# Patient Record
Sex: MALE | Race: WHITE | NOT HISPANIC OR LATINO | URBAN - METROPOLITAN AREA
[De-identification: names, ages, dates, MRNs, and addresses within clinical notes are randomized per-mention and may not be internally consistent; named-entity substitution may affect disease eponyms.]

---

## 2021-04-28 ENCOUNTER — INPATIENT (INPATIENT)
Facility: HOSPITAL | Age: 76
LOS: 1 days | Discharge: HOME | End: 2021-04-30
Attending: THORACIC SURGERY (CARDIOTHORACIC VASCULAR SURGERY) | Admitting: THORACIC SURGERY (CARDIOTHORACIC VASCULAR SURGERY)
Payer: MEDICARE

## 2021-04-28 VITALS
HEART RATE: 78 BPM | DIASTOLIC BLOOD PRESSURE: 80 MMHG | RESPIRATION RATE: 18 BRPM | OXYGEN SATURATION: 98 % | WEIGHT: 210.1 LBS | TEMPERATURE: 98 F | SYSTOLIC BLOOD PRESSURE: 174 MMHG

## 2021-04-28 LAB
ALBUMIN SERPL ELPH-MCNC: 4.7 G/DL — SIGNIFICANT CHANGE UP (ref 3.5–5.2)
ALP SERPL-CCNC: 69 U/L — SIGNIFICANT CHANGE UP (ref 30–115)
ALT FLD-CCNC: 14 U/L — SIGNIFICANT CHANGE UP (ref 0–41)
ANION GAP SERPL CALC-SCNC: 13 MMOL/L — SIGNIFICANT CHANGE UP (ref 7–14)
APTT BLD: 29.8 SEC — SIGNIFICANT CHANGE UP (ref 27–39.2)
AST SERPL-CCNC: 15 U/L — SIGNIFICANT CHANGE UP (ref 0–41)
BASOPHILS # BLD AUTO: 0.08 K/UL — SIGNIFICANT CHANGE UP (ref 0–0.2)
BASOPHILS NFR BLD AUTO: 0.7 % — SIGNIFICANT CHANGE UP (ref 0–1)
BILIRUB SERPL-MCNC: 0.4 MG/DL — SIGNIFICANT CHANGE UP (ref 0.2–1.2)
BUN SERPL-MCNC: 27 MG/DL — HIGH (ref 10–20)
CALCIUM SERPL-MCNC: 9.8 MG/DL — SIGNIFICANT CHANGE UP (ref 8.5–10.1)
CHLORIDE SERPL-SCNC: 106 MMOL/L — SIGNIFICANT CHANGE UP (ref 98–110)
CO2 SERPL-SCNC: 21 MMOL/L — SIGNIFICANT CHANGE UP (ref 17–32)
CREAT SERPL-MCNC: 1.3 MG/DL — SIGNIFICANT CHANGE UP (ref 0.7–1.5)
EOSINOPHIL # BLD AUTO: 0.44 K/UL — SIGNIFICANT CHANGE UP (ref 0–0.7)
EOSINOPHIL NFR BLD AUTO: 3.7 % — SIGNIFICANT CHANGE UP (ref 0–8)
GLUCOSE SERPL-MCNC: 111 MG/DL — HIGH (ref 70–99)
HCT VFR BLD CALC: 42.8 % — SIGNIFICANT CHANGE UP (ref 42–52)
HGB BLD-MCNC: 14 G/DL — SIGNIFICANT CHANGE UP (ref 14–18)
IMM GRANULOCYTES NFR BLD AUTO: 0.6 % — HIGH (ref 0.1–0.3)
INR BLD: 1.01 RATIO — SIGNIFICANT CHANGE UP (ref 0.65–1.3)
LYMPHOCYTES # BLD AUTO: 29.5 % — SIGNIFICANT CHANGE UP (ref 20.5–51.1)
LYMPHOCYTES # BLD AUTO: 3.49 K/UL — HIGH (ref 1.2–3.4)
MCHC RBC-ENTMCNC: 29 PG — SIGNIFICANT CHANGE UP (ref 27–31)
MCHC RBC-ENTMCNC: 32.7 G/DL — SIGNIFICANT CHANGE UP (ref 32–37)
MCV RBC AUTO: 88.6 FL — SIGNIFICANT CHANGE UP (ref 80–94)
MONOCYTES # BLD AUTO: 1 K/UL — HIGH (ref 0.1–0.6)
MONOCYTES NFR BLD AUTO: 8.4 % — SIGNIFICANT CHANGE UP (ref 1.7–9.3)
NEUTROPHILS # BLD AUTO: 6.77 K/UL — HIGH (ref 1.4–6.5)
NEUTROPHILS NFR BLD AUTO: 57.1 % — SIGNIFICANT CHANGE UP (ref 42.2–75.2)
NRBC # BLD: 0 /100 WBCS — SIGNIFICANT CHANGE UP (ref 0–0)
PLATELET # BLD AUTO: 228 K/UL — SIGNIFICANT CHANGE UP (ref 130–400)
POTASSIUM SERPL-MCNC: 4.8 MMOL/L — SIGNIFICANT CHANGE UP (ref 3.5–5)
POTASSIUM SERPL-SCNC: 4.8 MMOL/L — SIGNIFICANT CHANGE UP (ref 3.5–5)
PROT SERPL-MCNC: 7.2 G/DL — SIGNIFICANT CHANGE UP (ref 6–8)
PROTHROM AB SERPL-ACNC: 11.6 SEC — SIGNIFICANT CHANGE UP (ref 9.95–12.87)
RBC # BLD: 4.83 M/UL — SIGNIFICANT CHANGE UP (ref 4.7–6.1)
RBC # FLD: 13 % — SIGNIFICANT CHANGE UP (ref 11.5–14.5)
SARS-COV-2 RNA SPEC QL NAA+PROBE: SIGNIFICANT CHANGE UP
SODIUM SERPL-SCNC: 140 MMOL/L — SIGNIFICANT CHANGE UP (ref 135–146)
TROPONIN T SERPL-MCNC: <0.01 NG/ML — SIGNIFICANT CHANGE UP
WBC # BLD: 11.85 K/UL — HIGH (ref 4.8–10.8)
WBC # FLD AUTO: 11.85 K/UL — HIGH (ref 4.8–10.8)

## 2021-04-28 PROCEDURE — 93010 ELECTROCARDIOGRAM REPORT: CPT

## 2021-04-28 PROCEDURE — 99285 EMERGENCY DEPT VISIT HI MDM: CPT

## 2021-04-28 PROCEDURE — 71045 X-RAY EXAM CHEST 1 VIEW: CPT | Mod: 26

## 2021-04-28 RX ORDER — DEXTROSE 50 % IN WATER 50 %
25 SYRINGE (ML) INTRAVENOUS ONCE
Refills: 0 | Status: DISCONTINUED | OUTPATIENT
Start: 2021-04-28 | End: 2021-04-30

## 2021-04-28 RX ORDER — DEXTROSE 50 % IN WATER 50 %
15 SYRINGE (ML) INTRAVENOUS ONCE
Refills: 0 | Status: DISCONTINUED | OUTPATIENT
Start: 2021-04-28 | End: 2021-04-30

## 2021-04-28 RX ORDER — ATORVASTATIN CALCIUM 80 MG/1
0 TABLET, FILM COATED ORAL
Qty: 0 | Refills: 0 | DISCHARGE

## 2021-04-28 RX ORDER — ENOXAPARIN SODIUM 100 MG/ML
40 INJECTION SUBCUTANEOUS DAILY
Refills: 0 | Status: DISCONTINUED | OUTPATIENT
Start: 2021-04-28 | End: 2021-04-30

## 2021-04-28 RX ORDER — GLUCAGON INJECTION, SOLUTION 0.5 MG/.1ML
1 INJECTION, SOLUTION SUBCUTANEOUS ONCE
Refills: 0 | Status: DISCONTINUED | OUTPATIENT
Start: 2021-04-28 | End: 2021-04-30

## 2021-04-28 RX ORDER — INSULIN GLARGINE 100 [IU]/ML
20 INJECTION, SOLUTION SUBCUTANEOUS AT BEDTIME
Refills: 0 | Status: DISCONTINUED | OUTPATIENT
Start: 2021-04-28 | End: 2021-04-28

## 2021-04-28 RX ORDER — LISINOPRIL 2.5 MG/1
0 TABLET ORAL
Qty: 0 | Refills: 0 | DISCHARGE

## 2021-04-28 RX ORDER — ASPIRIN/CALCIUM CARB/MAGNESIUM 324 MG
162 TABLET ORAL ONCE
Refills: 0 | Status: COMPLETED | OUTPATIENT
Start: 2021-04-28 | End: 2021-04-28

## 2021-04-28 RX ORDER — INSULIN LISPRO 100/ML
VIAL (ML) SUBCUTANEOUS
Refills: 0 | Status: DISCONTINUED | OUTPATIENT
Start: 2021-04-28 | End: 2021-04-30

## 2021-04-28 RX ORDER — METOPROLOL TARTRATE 50 MG
0 TABLET ORAL
Qty: 0 | Refills: 0 | DISCHARGE

## 2021-04-28 RX ORDER — SODIUM CHLORIDE 9 MG/ML
1000 INJECTION, SOLUTION INTRAVENOUS
Refills: 0 | Status: DISCONTINUED | OUTPATIENT
Start: 2021-04-28 | End: 2021-04-30

## 2021-04-28 RX ORDER — CHLORHEXIDINE GLUCONATE 213 G/1000ML
1 SOLUTION TOPICAL DAILY
Refills: 0 | Status: DISCONTINUED | OUTPATIENT
Start: 2021-04-28 | End: 2021-04-30

## 2021-04-28 RX ORDER — METFORMIN HYDROCHLORIDE 850 MG/1
0 TABLET ORAL
Qty: 0 | Refills: 0 | DISCHARGE

## 2021-04-28 RX ADMIN — Medication 162 MILLIGRAM(S): at 22:55

## 2021-04-28 NOTE — H&P ADULT - NSHPPHYSICALEXAM_GEN_ALL_CORE
CONSTITUTIONAL: Well-appearing; well-nourished; in no apparent distress.   	EYES: PERRL; EOM intact.   	CARDIOVASCULAR: Normal S1, S2; no murmurs, rubs, or gallops.   	RESPIRATORY: Normal chest excursion with respiration; breath sounds clear and equal bilaterally; no wheezes, rhonchi, or rales.  	GI/: Normal bowel sounds; non-distended; non-tender; no palpable organomegaly.   	MS: No calf swelling and tenderness.  	SKIN: Normal for age and race; warm; dry; good turgor; no apparent lesions or exudate.   NEURO/PSYCH: A & O x 4; grossly unremarkable.

## 2021-04-28 NOTE — H&P ADULT - NSHPLABSRESULTS_GEN_ALL_CORE
LABS:                        14.0   11.85 )-----------( 228      ( 28 Apr 2021 21:46 )             42.8     04-28    140  |  106  |  27<H>  ----------------------------<  111<H>  4.8   |  21  |  1.3    Ca    9.8      28 Apr 2021 21:46    TPro  7.2  /  Alb  4.7  /  TBili  0.4  /  DBili  x   /  AST  15  /  ALT  14  /  AlkPhos  69  04-28    PT/INR - ( 28 Apr 2021 21:46 )   PT: 11.60 sec;   INR: 1.01 ratio         PTT - ( 28 Apr 2021 21:46 )  PTT:29.8 sec      Troponin T, Serum: <0.01 ng/mL (04-28-21 @ 21:46)      CARDIAC MARKERS ( 28 Apr 2021 21:46 )  x     / <0.01 ng/mL / x     / x     / x

## 2021-04-28 NOTE — ED PROVIDER NOTE - CLINICAL SUMMARY MEDICAL DECISION MAKING FREE TEXT BOX
Pt was sent in by cardiologist Dr. Collins for cardiac cath. Required ekg, labs, imaging. Will admit to tele for further management.

## 2021-04-28 NOTE — H&P ADULT - HISTORY OF PRESENT ILLNESS
76 yo male hx of HTN/HLD/heavy smoker present c/o dyspnea on exertion for few weeks. Seen by PMD as outpatient had grossly positive nuclear stress as outpatient few days ago. (decreased perfusion/wall motion to inferior wall). continue with SOB on exertion so he came to ED for evaluation. denies chest pain/diaphoresis assoc with SOB.   denies fever/chill/coughing/abd pain/n/v/d and urinary sxs    Vital Signs Last 24 Hrs  T(F): 97.8 (28 Apr 2021 20:53), Max: 97.8 (28 Apr 2021 20:53)  HR: 78 (28 Apr 2021 20:53) (78 - 78)  BP: 174/80 (28 Apr 2021 20:53) (174/80 - 174/80)  RR: 18 (28 Apr 2021 20:53) (18 - 18)  SpO2: 98% (28 Apr 2021 20:53) (98% - 98%)    In Ed patient was recived vitally stable and EKG showed Sinus with TWI to lateral leads (AVL and V5,6) with PVCs. Trops negative x1 (<0.01)  ED provider spoke to Karina. no plavix/heparin now. Cath tomorrow. keep NPO after midnight.

## 2021-04-28 NOTE — ED PROVIDER NOTE - OBJECTIVE STATEMENT
76 yo male hx of HTN/HLD/heavy smoker present c/o dyspnea on exertion for few weeks. Seen by PMD as outpatient had grossly positive nuclear stress as outpatient few days ago. (decreased perfusion/wall motion to inferior wall). continue with SOB on exertion so he came to ED for evaluation. denies chest pain/diaphoresis assoc with SOB.   denies fever/chill/coughing/abd pain/n/v/d and urinary sxs.

## 2021-04-28 NOTE — ED PROVIDER NOTE - IV ALTEPLASE ADMIN OUTSIDE HIDDEN
Assumed cares [id number]. Admitted this evening for COVID-19 and hyponatremia. A&Ox4. VSS on 2L O2. A1 and BA on for safety. PIV x2 TKO and infusing ABX. C/o SOB and frequent cough. Sputum sample ordered but unable to obtain this shift. Clear liquid diet and 1.5L FR. On tele monitoring for hyponatremia. Last Na+ check was 133. Endorsed pain in back and lidocaine patch placed. Nasal swab obtained for MRSA. Voiding spontaneously. BM x1 and stool sample obtained. Stool was black and pt stated he took activated charcoal. Will continue to monitor and update MD with changes.       show

## 2021-04-28 NOTE — ED ADULT NURSE REASSESSMENT NOTE - NS ED NURSE REASSESS COMMENT FT1
Assumed care. Pt lying comfortably in bed. Respiration even and unlabored. Cardiac monitor in progress with NSR. Pt denies any chest pain or SOB. VSS. Needs met. Call bell placed within reach. Pending admission bed. will monitor

## 2021-04-28 NOTE — ED PROVIDER NOTE - ATTENDING CONTRIBUTION TO CARE
I personally evaluated the patient. I reviewed the Resident’s or Physician Assistant’s note (as assigned above), and agree with the findings and plan except as documented in my note.  75 M with hx of HTN, CVA, DM with complaints of 2 weeks of coughing, SOB, fever and generalized body aches. Pt report having 2 neg outpt Covid tests. States that his PMD did CXR w/o any findings. Was started on z-dawit, prednisone and benzoate for management of his symptoms. Denies dizziness, abd pain,  n/v/d, no urinary complaints. VS reviewed, pt non-toxic appearing however pt is diaphoretic, NAD. Head ncat, MMM, neck supple, normal ROM, normal s1s2 without any murmurs, Lungs CTAB with normal work of breathing. abd +BS, s/nd/nt, no CVAT b/l, extremities wnl, neuro exam grossly normal. No acute skin rashes. Plan is ekg, labs, imaging, meds as needed and dispo accordingly.   ' I personally evaluated the patient. I reviewed the Resident’s or Physician Assistant’s note (as assigned above), and agree with the findings and plan except as documented in my note. I personally evaluated the patient. I reviewed the Resident’s or Physician Assistant’s note (as assigned above), and agree with the findings and plan except as documented in my note.  75 M with hx of HTN, HLD, + smoker was sent in by his cardiologist Dr. Ayush Collins for urgent cardiac cath (in the am). Pt with several weeks of exertionally I personally evaluated the patient. I reviewed the Resident’s or Physician Assistant’s note (as assigned above), and agree with the findings and plan except as documented in my note.  75 M with hx of HTN, HLD, + smoker was sent in by his cardiologist Dr. Ayush Collins for urgent cardiac cath (in the am). Pt with several weeks of exertional SOB. No fever, coughing, LOC. VS reviewed, pt non-toxic appearing, NAD. Head ncat, MMM, pharyngeal exam w/o erythema, edema or exudates. B/l TM wnl. neck supple, normal ROM, normal s1s2 without any murmurs, Lungs CTAB with normal work of breathing. abd +BS, s/nd/nt, extremities wnl, neuro exam grossly normal. No acute skin rashes. Plan is ekg, labs, imaging and dispo accordingly.

## 2021-04-28 NOTE — H&P ADULT - ASSESSMENT
76 yo male hx of HTN/HLD/heavy smoker present c/o dyspnea on exertion for few weeks. Seen by PMD as outpatient had grossly positive nuclear stress as outpatient few days ago. (decreased perfusion/wall motion to inferior wall). continue with SOB on exertion so he came to ED for evaluation.    # Stable Angina  - Stress test as OP showed decreased perfusion/wall motion to inferior wall  - EKG showed Sinus with TWI to lateral leads (AVL and V5,6) with PVCs. Trops negative x1 (<0.01)  - ED provider spoke to Sadamariana. no plavix/heparin now. Cath tomorrow.   - keep NPO after midnight.    # H/o HLD  - follow lipid profile in am   - c/w statins    # H/o HTN  -Monitor BP  -Continue home meds    Diet: NPO @ MN  Activity: IAT  DVT Prophylaxis: Lovenox  GI Prophylaxis: not indicated  CHG Ordered  Code Status: FULL  Disposition: admit to tele   74 yo male hx of HTN/HLD/heavy smoker present c/o dyspnea on exertion for few weeks. Seen by PMD as outpatient had grossly positive nuclear stress as outpatient few days ago. (decreased perfusion/wall motion to inferior wall). continue with SOB on exertion so he came to ED for evaluation.    # Stable Angina  - Stress test as OP showed decreased perfusion/wall motion to inferior wall  - EKG showed Sinus with TWI to lateral leads (AVL and V5,6) with PVCs. Trops negative x1 (<0.01)  - ED provider spoke to Sadamariana. no plavix/heparin now. Cath tomorrow.   - keep NPO after midnight.    # H/o DM  -Monitor blood glucose  -Check A1c  -goal glucose level: 140-180 mg/dl  -start basal/bolus insulin if FS >180    # H/o HLD  - follow lipid profile in am   - c/w statins    # H/o HTN  -Monitor BP  -Continue home meds    Diet: NPO @ MN  Activity: IAT  DVT Prophylaxis: Lovenox  GI Prophylaxis: not indicated  CHG Ordered  Code Status: FULL  Disposition: admit to tele

## 2021-04-29 LAB
A1C WITH ESTIMATED AVERAGE GLUCOSE RESULT: 7.8 % — HIGH (ref 4–5.6)
ALBUMIN SERPL ELPH-MCNC: 4.4 G/DL — SIGNIFICANT CHANGE UP (ref 3.5–5.2)
ALP SERPL-CCNC: 64 U/L — SIGNIFICANT CHANGE UP (ref 30–115)
ALT FLD-CCNC: 14 U/L — SIGNIFICANT CHANGE UP (ref 0–41)
ANION GAP SERPL CALC-SCNC: 12 MMOL/L — SIGNIFICANT CHANGE UP (ref 7–14)
APPEARANCE UR: CLEAR — SIGNIFICANT CHANGE UP
APTT BLD: 30.3 SEC — SIGNIFICANT CHANGE UP (ref 27–39.2)
AST SERPL-CCNC: 15 U/L — SIGNIFICANT CHANGE UP (ref 0–41)
BASOPHILS # BLD AUTO: 0.07 K/UL — SIGNIFICANT CHANGE UP (ref 0–0.2)
BASOPHILS NFR BLD AUTO: 0.7 % — SIGNIFICANT CHANGE UP (ref 0–1)
BILIRUB SERPL-MCNC: 0.5 MG/DL — SIGNIFICANT CHANGE UP (ref 0.2–1.2)
BILIRUB UR-MCNC: NEGATIVE — SIGNIFICANT CHANGE UP
BLD GP AB SCN SERPL QL: SIGNIFICANT CHANGE UP
BUN SERPL-MCNC: 22 MG/DL — HIGH (ref 10–20)
CALCIUM SERPL-MCNC: 9.4 MG/DL — SIGNIFICANT CHANGE UP (ref 8.5–10.1)
CHLORIDE SERPL-SCNC: 106 MMOL/L — SIGNIFICANT CHANGE UP (ref 98–110)
CHOLEST SERPL-MCNC: 132 MG/DL — SIGNIFICANT CHANGE UP
CO2 SERPL-SCNC: 22 MMOL/L — SIGNIFICANT CHANGE UP (ref 17–32)
COLOR SPEC: SIGNIFICANT CHANGE UP
CREAT SERPL-MCNC: 1.1 MG/DL — SIGNIFICANT CHANGE UP (ref 0.7–1.5)
DIFF PNL FLD: NEGATIVE — SIGNIFICANT CHANGE UP
EOSINOPHIL # BLD AUTO: 0.42 K/UL — SIGNIFICANT CHANGE UP (ref 0–0.7)
EOSINOPHIL NFR BLD AUTO: 4.2 % — SIGNIFICANT CHANGE UP (ref 0–8)
ESTIMATED AVERAGE GLUCOSE: 177 MG/DL — HIGH (ref 68–114)
GLUCOSE BLDC GLUCOMTR-MCNC: 126 MG/DL — HIGH (ref 70–99)
GLUCOSE BLDC GLUCOMTR-MCNC: 145 MG/DL — HIGH (ref 70–99)
GLUCOSE BLDC GLUCOMTR-MCNC: 93 MG/DL — SIGNIFICANT CHANGE UP (ref 70–99)
GLUCOSE SERPL-MCNC: 134 MG/DL — HIGH (ref 70–99)
GLUCOSE UR QL: NEGATIVE — SIGNIFICANT CHANGE UP
HCT VFR BLD CALC: 41.9 % — LOW (ref 42–52)
HCV AB S/CO SERPL IA: 0.03 COI — SIGNIFICANT CHANGE UP
HCV AB SERPL-IMP: SIGNIFICANT CHANGE UP
HDLC SERPL-MCNC: 28 MG/DL — LOW
HGB BLD-MCNC: 13.6 G/DL — LOW (ref 14–18)
IMM GRANULOCYTES NFR BLD AUTO: 0.4 % — HIGH (ref 0.1–0.3)
INR BLD: 1.03 RATIO — SIGNIFICANT CHANGE UP (ref 0.65–1.3)
KETONES UR-MCNC: SIGNIFICANT CHANGE UP
LACTATE SERPL-SCNC: 0.9 MMOL/L — SIGNIFICANT CHANGE UP (ref 0.7–2)
LEUKOCYTE ESTERASE UR-ACNC: NEGATIVE — SIGNIFICANT CHANGE UP
LIPID PNL WITH DIRECT LDL SERPL: 80 MG/DL — SIGNIFICANT CHANGE UP
LYMPHOCYTES # BLD AUTO: 2.58 K/UL — SIGNIFICANT CHANGE UP (ref 1.2–3.4)
LYMPHOCYTES # BLD AUTO: 25.7 % — SIGNIFICANT CHANGE UP (ref 20.5–51.1)
MAGNESIUM SERPL-MCNC: 1.9 MG/DL — SIGNIFICANT CHANGE UP (ref 1.8–2.4)
MCHC RBC-ENTMCNC: 29 PG — SIGNIFICANT CHANGE UP (ref 27–31)
MCHC RBC-ENTMCNC: 32.5 G/DL — SIGNIFICANT CHANGE UP (ref 32–37)
MCV RBC AUTO: 89.3 FL — SIGNIFICANT CHANGE UP (ref 80–94)
MONOCYTES # BLD AUTO: 0.9 K/UL — HIGH (ref 0.1–0.6)
MONOCYTES NFR BLD AUTO: 9 % — SIGNIFICANT CHANGE UP (ref 1.7–9.3)
MRSA PCR RESULT.: NEGATIVE — SIGNIFICANT CHANGE UP
NEUTROPHILS # BLD AUTO: 6.02 K/UL — SIGNIFICANT CHANGE UP (ref 1.4–6.5)
NEUTROPHILS NFR BLD AUTO: 60 % — SIGNIFICANT CHANGE UP (ref 42.2–75.2)
NITRITE UR-MCNC: NEGATIVE — SIGNIFICANT CHANGE UP
NON HDL CHOLESTEROL: 104 MG/DL — SIGNIFICANT CHANGE UP
NRBC # BLD: 0 /100 WBCS — SIGNIFICANT CHANGE UP (ref 0–0)
NT-PROBNP SERPL-SCNC: 616 PG/ML — HIGH (ref 0–300)
PH UR: 6 — SIGNIFICANT CHANGE UP (ref 5–8)
PHOSPHATE SERPL-MCNC: 3.4 MG/DL — SIGNIFICANT CHANGE UP (ref 2.1–4.9)
PLATELET # BLD AUTO: 217 K/UL — SIGNIFICANT CHANGE UP (ref 130–400)
POTASSIUM SERPL-MCNC: 4.6 MMOL/L — SIGNIFICANT CHANGE UP (ref 3.5–5)
POTASSIUM SERPL-SCNC: 4.6 MMOL/L — SIGNIFICANT CHANGE UP (ref 3.5–5)
PROT SERPL-MCNC: 6.6 G/DL — SIGNIFICANT CHANGE UP (ref 6–8)
PROT UR-MCNC: NEGATIVE — SIGNIFICANT CHANGE UP
PROTHROM AB SERPL-ACNC: 11.8 SEC — SIGNIFICANT CHANGE UP (ref 9.95–12.87)
RBC # BLD: 4.69 M/UL — LOW (ref 4.7–6.1)
RBC # FLD: 13.2 % — SIGNIFICANT CHANGE UP (ref 11.5–14.5)
SODIUM SERPL-SCNC: 140 MMOL/L — SIGNIFICANT CHANGE UP (ref 135–146)
SP GR SPEC: 1.02 — SIGNIFICANT CHANGE UP (ref 1.01–1.03)
TRIGL SERPL-MCNC: 154 MG/DL — HIGH
TROPONIN T SERPL-MCNC: <0.01 NG/ML — SIGNIFICANT CHANGE UP
UROBILINOGEN FLD QL: SIGNIFICANT CHANGE UP
WBC # BLD: 10.03 K/UL — SIGNIFICANT CHANGE UP (ref 4.8–10.8)
WBC # FLD AUTO: 10.03 K/UL — SIGNIFICANT CHANGE UP (ref 4.8–10.8)

## 2021-04-29 PROCEDURE — 93460 R&L HRT ART/VENTRICLE ANGIO: CPT | Mod: 26

## 2021-04-29 PROCEDURE — 93880 EXTRACRANIAL BILAT STUDY: CPT | Mod: 26

## 2021-04-29 PROCEDURE — 93306 TTE W/DOPPLER COMPLETE: CPT | Mod: 26

## 2021-04-29 PROCEDURE — 71250 CT THORAX DX C-: CPT | Mod: 26

## 2021-04-29 PROCEDURE — 93010 ELECTROCARDIOGRAM REPORT: CPT

## 2021-04-29 RX ORDER — ATORVASTATIN CALCIUM 80 MG/1
40 TABLET, FILM COATED ORAL AT BEDTIME
Refills: 0 | Status: DISCONTINUED | OUTPATIENT
Start: 2021-04-29 | End: 2021-04-30

## 2021-04-29 RX ORDER — SODIUM CHLORIDE 9 MG/ML
1000 INJECTION INTRAMUSCULAR; INTRAVENOUS; SUBCUTANEOUS
Refills: 0 | Status: DISCONTINUED | OUTPATIENT
Start: 2021-04-29 | End: 2021-04-30

## 2021-04-29 RX ORDER — IPRATROPIUM/ALBUTEROL SULFATE 18-103MCG
3 AEROSOL WITH ADAPTER (GRAM) INHALATION EVERY 6 HOURS
Refills: 0 | Status: DISCONTINUED | OUTPATIENT
Start: 2021-04-29 | End: 2021-04-30

## 2021-04-29 RX ORDER — LISINOPRIL 2.5 MG/1
5 TABLET ORAL DAILY
Refills: 0 | Status: DISCONTINUED | OUTPATIENT
Start: 2021-04-29 | End: 2021-04-29

## 2021-04-29 RX ORDER — NICOTINE POLACRILEX 2 MG
1 GUM BUCCAL DAILY
Refills: 0 | Status: DISCONTINUED | OUTPATIENT
Start: 2021-04-29 | End: 2021-04-30

## 2021-04-29 RX ORDER — LANOLIN ALCOHOL/MO/W.PET/CERES
5 CREAM (GRAM) TOPICAL AT BEDTIME
Refills: 0 | Status: DISCONTINUED | OUTPATIENT
Start: 2021-04-29 | End: 2021-04-30

## 2021-04-29 RX ORDER — METOPROLOL TARTRATE 50 MG
25 TABLET ORAL DAILY
Refills: 0 | Status: DISCONTINUED | OUTPATIENT
Start: 2021-04-29 | End: 2021-04-30

## 2021-04-29 RX ORDER — SODIUM CHLORIDE 9 MG/ML
900 INJECTION INTRAMUSCULAR; INTRAVENOUS; SUBCUTANEOUS
Refills: 0 | Status: DISCONTINUED | OUTPATIENT
Start: 2021-04-29 | End: 2021-04-29

## 2021-04-29 RX ORDER — PANTOPRAZOLE SODIUM 20 MG/1
40 TABLET, DELAYED RELEASE ORAL
Refills: 0 | Status: DISCONTINUED | OUTPATIENT
Start: 2021-04-30 | End: 2021-04-30

## 2021-04-29 RX ADMIN — Medication 600 MILLIGRAM(S): at 17:12

## 2021-04-29 RX ADMIN — Medication 1 PATCH: at 19:28

## 2021-04-29 RX ADMIN — ENOXAPARIN SODIUM 40 MILLIGRAM(S): 100 INJECTION SUBCUTANEOUS at 16:19

## 2021-04-29 RX ADMIN — ATORVASTATIN CALCIUM 40 MILLIGRAM(S): 80 TABLET, FILM COATED ORAL at 21:55

## 2021-04-29 RX ADMIN — CHLORHEXIDINE GLUCONATE 1 APPLICATION(S): 213 SOLUTION TOPICAL at 16:16

## 2021-04-29 RX ADMIN — SODIUM CHLORIDE 100 MILLILITER(S): 9 INJECTION INTRAMUSCULAR; INTRAVENOUS; SUBCUTANEOUS at 17:13

## 2021-04-29 RX ADMIN — Medication 1 PATCH: at 16:17

## 2021-04-29 RX ADMIN — Medication 5 MILLIGRAM(S): at 21:55

## 2021-04-29 NOTE — MEDICAL STUDENT PROGRESS NOTE(EDUCATION) - NS MD HP STUD ASPLAN ASSES FT
Mr. Vasquez is a 74 yo male with a history of HTN, HLD, heavy smoking, and DM presented yesterday with worsening dyspnea on exertion, today found to have multi-vessel CAD and severe AS, planning to get AVR CABG soon.

## 2021-04-29 NOTE — PROGRESS NOTE ADULT - SUBJECTIVE AND OBJECTIVE BOX
Hospital Day:  1d    Subjective: Patient is a 75y old  Male who presents with a chief complaint of Chest pain (28 Apr 2021 23:22)      Pt seen and evaluated at bedside.   Complaints: patient reports dyspnea on exertion but no chest pain; comfortable at rest   Over the night Events: none     Past Medical Hx:   HTN (hypertension)    HLD (hyperlipidemia)      Past Sx:  No significant past surgical history      Allergies:  No Known Allergies    Current Meds:   Standng Meds:  atorvastatin 40 milliGRAM(s) Oral at bedtime  chlorhexidine 4% Liquid 1 Application(s) Topical daily  dextrose 40% Gel 15 Gram(s) Oral once  dextrose 5%. 1000 milliLiter(s) (50 mL/Hr) IV Continuous <Continuous>  dextrose 50% Injectable 25 Gram(s) IV Push once  enoxaparin Injectable 40 milliGRAM(s) SubCutaneous daily  glucagon  Injectable 1 milliGRAM(s) IntraMuscular once  insulin lispro (ADMELOG) corrective regimen sliding scale   SubCutaneous three times a day before meals  lisinopril 5 milliGRAM(s) Oral daily  metoprolol succinate ER 25 milliGRAM(s) Oral daily  nicotine - 21 mG/24Hr(s) Patch 1 patch Transdermal daily    PRN Meds:      Vital Signs:   T(F): 96.7 (04-29-21 @ 02:34), Max: 97.8 (04-28-21 @ 20:53)  HR: 73 (04-29-21 @ 02:34) (73 - 78)  BP: 158/68 (04-29-21 @ 02:34) (158/68 - 174/80)  RR: 18 (04-29-21 @ 02:34) (18 - 18)  SpO2: 96% (04-29-21 @ 08:00) (96% - 98%)    Physical Exam:   GENERAL: NAD, Resting in bed  HEENT: NCAT  CHEST/LUNG: Clear to auscultation bilaterally; No wheezing or rubs.   HEART: Regular rate and rhythm; No murmurs, rubs, or gallops  ABDOMEN: Bowel sounds present; Soft, Nontender, Nondistended.   EXTREMITIES:  No clubbing, cyanosis, or edema  NERVOUS SYSTEM:  Alert & Oriented X3    FLUID BALANCE      Labs:                         13.6   10.03 )-----------( 217      ( 29 Apr 2021 09:00 )             41.9     Neutophil% 60.0, Lymphocyte% 25.7, Monocyte% 9.0, Bands% 0.4 04-29-21 @ 09:00    29 Apr 2021 09:00    140    |  106    |  22     ----------------------------<  134    4.6     |  22     |  1.1      Ca    9.4        29 Apr 2021 09:00  Phos  3.4       29 Apr 2021 09:00  Mg     1.9       29 Apr 2021 09:00    TPro  6.6    /  Alb  4.4    /  TBili  0.5    /  DBili  x      /  AST  15     /  ALT  14     /  AlkPhos  64     29 Apr 2021 09:00       pTT    30.3             ----< 1.03 INR  (04-29-21 @ 09:00)    11.80        PT,    pTT    29.8             ----< 1.01 INR  (04-28-21 @ 21:46)    11.60        PT  Chol 132, LDL --, HDL 28, VLDL --,  04-29-21 @ 09:00          Troponin <0.01, CKMB --, CK -- 04-29-21 @ 09:00  Troponin <0.01, CKMB --, CK -- 04-28-21 @ 21:46

## 2021-04-29 NOTE — PROGRESS NOTE ADULT - ASSESSMENT
76 yo male hx of HTN/HLD/heavy smoker present c/o dyspnea on exertion for few weeks. Seen by PMD as outpatient had grossly positive nuclear stress as outpatient few days ago. (decreased perfusion/wall motion to inferior wall). continue with SOB on exertion so he came to ED for evaluation.    # Dypsnea on exertion secondary to CAD  - there may be a component on COPD due to tubular lungs on CXR and active smoker  - Stress test as OP showed decreased perfusion/wall motion to inferior wall  - EKG showed Sinus with TWI to lateral leads (AVL and V5,6) with PVCs. Trops negative x1 (<0.01)  - lipid profile: , HDL 28, LDL 80; f/u A1c  - Cardiac cath today; cardiology following     # H/o DM  - Monitor blood glucose; on metformin 500mg bid at home   - Check A1c  - goal glucose level: 140-180 mg/dl  - start basal/bolus insulin if FS >180    # H/o HLD  - follow lipid profile as above   - c/w atorva 40mg hs    # H/o HTN  - Monitor BP  - c/w lisinopril 5mg daily     Diet: NPO @ MN  Activity: IAT  DVT Prophylaxis: Lovenox  GI Prophylaxis: not indicated  CHG Ordered  Code Status: FULL  Disposition: from home no needs; cardiac cath today, f/u a1c    76 yo male hx of HTN/HLD/heavy smoker present c/o dyspnea on exertion for few weeks. Seen by PMD as outpatient had grossly positive nuclear stress as outpatient few days ago. (decreased perfusion/wall motion to inferior wall). continue with SOB on exertion so he came to ED for evaluation.    # Dypsnea on exertion secondary to CAD and severe AS  - there may be a component on COPD due to tubular lungs on CXR and active smoker  - Stress test as OP showed decreased perfusion/wall motion to inferior wall  - EKG showed Sinus with TWI to lateral leads (AVL and V5,6) with PVCs. Trops negative x1 (<0.01)  - lipid profile: , HDL 28, LDL 80; f/u A1c  - Cardiac cath 4/29: two vessel disease prox 80% in LAD, mid 70% in RCA; severe aortic stenosis  - CT surgery consulting for severe AS and CABG  - CT chest and b/l carotid ultrasound TAVR protocol  - echo to confirm AS  - PFTs and thyroid panel for TAVR     # H/o DM  - Monitor blood glucose; on metformin 500mg bid at home   - Check A1c  - goal glucose level: 140-180 mg/dl  - start basal/bolus insulin if FS >180    # H/o HLD  - follow lipid profile as above   - c/w atorva 40mg hs    # H/o HTN  - Monitor BP  - c/w lisinopril 5mg daily     Diet: NPO @ MN  DVT Prophylaxis: Lovenox  GI Prophylaxis: not indicated  CHG Ordered  Code Status: FULL  Disposition: from home no needs; f/u a1c, thyroid panel; scans for TAVR protocol; echo    74 yo male hx of HTN/HLD/heavy smoker present c/o dyspnea on exertion for few weeks. Seen by PMD as outpatient had grossly positive nuclear stress as outpatient few days ago. (decreased perfusion/wall motion to inferior wall). continue with SOB on exertion so he came to ED for evaluation.    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 Welsh and Dr. Ritchie , we need to bx the mass  will discuss in AM approach for bx , bronch v CT guided    # H/o DM  - Monitor blood glucose; on metformin 500mg bid at home   - Check A1c  - goal glucose level: 140-180 mg/dl  - start basal/bolus insulin if FS >180    # H/o HLD  - follow lipid profile as above   - c/w atorva 40mg hs    # H/o HTN  - Monitor BP  - c/w lisinopril 5mg daily     Diet: NPO @ MN  DVT Prophylaxis: Lovenox  GI Prophylaxis: not indicated  CHG Ordered  Code Status: FULL

## 2021-04-29 NOTE — MEDICAL STUDENT PROGRESS NOTE(EDUCATION) - SUBJECTIVE AND OBJECTIVE BOX
Pt is a 76 yo male with a history of HTN, HLD, heavy smoking, and DM presented yesterday with worsening dyspnea on exertion, today found to have multi-vessel CAD and severe AS, planning to get AVR CABG soon.    Subjective:   Pt presented with shortness of breath for the past few weeks.    Interval history: Pt is feeling better compared to initial  presentation. Has not had trouble breathing since being admitted. Reports no chest pain or lightheadedness.    No overnight events.     Objective:  Vital Signs Last 24 Hrs  T(F): 97.8 (28 Apr 2021 20:53), Max: 97.8 (28 Apr 2021 20:53)  HR: 78 (28 Apr 2021 20:53) (78 - 78)  BP: 174/80 (28 Apr 2021 20:53) (174/80 - 174/80)  RR: 18 (28 Apr 2021 20:53) (18 - 18)  SpO2: 98% (28 Apr 2021 20:53) (98% - 98%)    (28 Apr 2021 23:22)      PAST MEDICAL HISTORY:  HTN (hypertension)    HLD (hyperlipidemia)    DM (diabetes mellitus)    No significant past surgical history      Tobacco use: smokes 1 pack of cigarettes every day    Allergies:  No Known Allergies    Hospital Medications:  atorvastatin 40 milliGRAM(s) Oral at bedtime  chlorhexidine 4% Liquid 1 Application(s) Topical daily  dextrose 40% Gel 15 Gram(s) Oral once  dextrose 5%. 1000 milliLiter(s) IV Continuous <Continuous>  dextrose 50% Injectable 25 Gram(s) IV Push once  enoxaparin Injectable 40 milliGRAM(s) SubCutaneous daily  glucagon  Injectable 1 milliGRAM(s) IntraMuscular once  insulin lispro (ADMELOG) corrective regimen sliding scale   SubCutaneous three times a day before meals  lisinopril 5 milliGRAM(s) Oral daily  metoprolol succinate ER 25 milliGRAM(s) Oral daily  nicotine - 21 mG/24Hr(s) Patch 1 patch Transdermal daily  sodium chloride 0.9%. 900 milliLiter(s) IV Continuous <Continuous>    Objective:   Vital Signs Last 24 Hrs  T(C): 35.9 (29 Apr 2021 02:34), Max: 36.6 (28 Apr 2021 20:53)  T(F): 96.7 (29 Apr 2021 02:34), Max: 97.8 (28 Apr 2021 20:53)  HR: 73 (29 Apr 2021 02:34) (73 - 78)  BP: 158/68 (29 Apr 2021 02:34) (158/68 - 174/80)  BP(mean): --  RR: 18 (29 Apr 2021 02:34) (18 - 18)  SpO2: 96% (29 Apr 2021 08:00) (96% - 98%)      LABS:                        13.6   10.03 )-----------( 217      ( 29 Apr 2021 09:00 )             41.9       04-29    140  |  106  |  22<H>  ----------------------------<  134<H>  4.6   |  22  |  1.1    Ca    9.4      29 Apr 2021 09:00  Phos  3.4     04-29  Mg     1.9     04-29    TPro  6.6  /  Alb  4.4  /  TBili  0.5  /  DBili  x   /  AST  15  /  ALT  14  /  AlkPhos  64  04-29      CARDIAC MARKERS ( 29 Apr 2021 09:00 )  x     / <0.01 ng/mL / x     / x     / x      CARDIAC MARKERS ( 28 Apr 2021 21:46 )  x     / <0.01 ng/mL / x     / x     / x          PHYSICAL EXAM:  GENERAL: lying in bed comfortably  EYES:conjunctiva and sclera clear  ENT: Moist mucous membranes  NECK: Supple, No JVD  CHEST/LUNG: Clear to auscultation bilaterally; No wheezing. Unlabored respirations.  HEART: Regular rate and rhythm  ABDOMEN: Soft, Nontender, Nondistended.   EXTREMITIES:  2+ Peripheral Pulses. No lower extremity edema.   NERVOUS SYSTEM:  Alert & Oriented X3  SKIN: No rashes or lesions    Procedures:  Catheterization - Left main: mild disease  LAD: 80% diffuse calcified lesion in ostial/prox  segments  Left Circumflex: mild disease  Right Coronary Artery: large vessel , 70% lesion in mid segment  POST-OP DIAGNOSIS  2 vessel CAD. severe AS

## 2021-04-29 NOTE — MEDICAL STUDENT PROGRESS NOTE(EDUCATION) - NS MD HP STUD ASPLAN PLAN FT
#stable angina   - 4/29 cath showed multi-vessel CAD and severe aortic stenosis  - as per CT surg: do pre-op labs for AVR CABG  - consider d/c telemetry     #HTN  - c/w metoprolol succinate 25 mg daily and lisinopril 5 mg daily     #HLD  - c/w atorvastatin 40 mg daily    #DM  - a1c = 7.8  - glucose has ranged from 145-177  - start basal/bolus insulin if fasting sugar is greater than 180    #smoker  -  pt on importance of smoking cessation for heart condition

## 2021-04-29 NOTE — CHART NOTE - NSCHARTNOTEFT_GEN_A_CORE
PRE-OP DIAGNOSIS: suspected CAD, abnormal stress test    PROCEDURE:[  x] LHC                         [ x ] Coronary angiography                         [  ] RHC  Physician: Dr Ramírez  Assistant: Eufemia    ANESTHESIA TYPE:  [  ]General Anesthesia  [ x ] Sedation  [  ] Local/Regional    ESTIMATED BLOOD LOSS:    10   mL    CONDITION  [  ] Critical  [  ] Serious  [  ]Fair  [ x ]Good    IV CONTRAST:  60  mL    FINDINGS  Left Heart Catheterization:  LVEF%:  LVEDP: normal  [ ] Normal Coronary Arteries  [ ] Luminal Irregularities  [ ] Non-obstructive CAD    ACCESS:    [x ] right radial artery  [ x] right femoral artery and vein    HEMOSTASIS:    [x ] D-Stat for radial access  [ ] Perclose  [ x] Manual compression    LEFT HEART CATHETERIZATION                                    Left main: mild disease  LAD: 80% diffuse calcified lesion in ostial/prox  segments  Left Circumflex:  Right Coronary Artery: large vessel , 70% lesion in mid segment      SYNTAX I/II SCORE:    INTERVENTION  IMPLANTS: n/a    APPROPRIATE USE CRITERIA (AUC):    SPECIMEN REMOVED: N/A    RIGHT HEART CATHETERIZATION  PA: 30/9/18  PCW: 11mmHg  CO/CI: 5.65  CURRY by Hakki formula : 0.88cm2    POST-OP DIAGNOSIS  2 vessel CAD. severe AS .      PLAN OF CARE  [ ] D/C Home today  [ ]  D/C in AM  [ ] Return to In-patient bed  [ ] Admit for observation  [ ] Return for staged procedure:  [x ] CT Surgery consult called  [ ]  Continue DAPT, B-blocker & Statin therapy PRE-OP DIAGNOSIS: suspected CAD, abnormal stress test    PROCEDURE:[  x] LHC                         [ x ] Coronary angiography                         [  ] RHC  Physician: Dr Ramírez  Assistant: Eufemia    ANESTHESIA TYPE:  [  ]General Anesthesia  [ x ] Sedation  [  ] Local/Regional    ESTIMATED BLOOD LOSS:    10   mL    CONDITION  [  ] Critical  [  ] Serious  [  ]Fair  [ x ]Good    IV CONTRAST:  60  mL    FINDINGS  Left Heart Catheterization:  LVEF%:  LVEDP: normal  [ ] Normal Coronary Arteries  [ ] Luminal Irregularities  [ ] Non-obstructive CAD    ACCESS:    [x ] right radial artery  [ x] right femoral artery and vein    HEMOSTASIS:    [x ] D-Stat for radial access  [ ] Perclose  [ x] Manual compression    LEFT HEART CATHETERIZATION                                    Left main: mild disease  LAD: 80% diffuse calcified lesion in ostial/prox  segments  Left Circumflex: mild disease  Right Coronary Artery: large vessel , 70% lesion in mid segment      SYNTAX I/II SCORE: 25    INTERVENTION  IMPLANTS: n/a    APPROPRIATE USE CRITERIA (AUC):    SPECIMEN REMOVED: N/A    RIGHT HEART CATHETERIZATION  PA: 30/9/18  PCW: 11mmHg  CO/CI: 5.65  CURRY by Hakki formula : 0.88cm2    POST-OP DIAGNOSIS  2 vessel CAD. severe AS .      PLAN OF CARE  [ ] D/C Home today  [ ]  D/C in AM  [ ] Return to In-patient bed  [ ] Admit for observation  [ ] Return for staged procedure:  [x ] CT Surgery consult called  [ ]  Continue DAPT, B-blocker & Statin therapy

## 2021-04-29 NOTE — CONSULT NOTE ADULT - SUBJECTIVE AND OBJECTIVE BOX
Surgeon: /Kayode/ Darian    Consult requesting by: Darrell Collins    HISTORY OF PRESENT ILLNESS:  74 yo male hx of DM, HTN/HLD/heavy smoker presented c/o dyspnea on exertion for few weeks. Seen by PMD as outpatient had grossly positive nuclear stress as outpatient few days ago. (decreased perfusion/wall motion to inferior wall). continue with SOB on exertion so he came to ED for evaluation. He denied chest pain/diaphoresis assoc with SOB. He denies fever/chill/coughing/abd pain/n/v/d and urinary sxs. Cardiac catheterization revealed multi-vessel disease. CTS consulted for myocardial revascularization via CABG and AVR      Home Medications:  atorvastatin 40 mg oral tablet: 1 tab(s) orally once a day (28 Apr 2021 23:38)  lisinopril 5 mg oral tablet: 1 tab(s) orally once a day (28 Apr 2021 23:38)  metFORMIN 500 mg oral tablet: 1 tab(s) orally 2 times a day (28 Apr 2021 23:38)  METOPROLOL SUCCINATE ER  25 MG TB24: 1 tab(s) orally once a day (28 Apr 2021 23:38)    NYHA functional class    [ ] Class I (no limitation) [ ] Class II (slight limitation) [ ] Class III (marked limitation) [ ] Class IV (symptoms at rest)    PAST MEDICAL & SURGICAL HISTORY:  HTN (hypertension)  DM  HLD (hyperlipidemia)  + tobacco    No significant past surgical history    MEDICATIONS  (STANDING):  atorvastatin 40 milliGRAM(s) Oral at bedtime  chlorhexidine 4% Liquid 1 Application(s) Topical daily  dextrose 40% Gel 15 Gram(s) Oral once  dextrose 5%. 1000 milliLiter(s) (50 mL/Hr) IV Continuous <Continuous>  dextrose 50% Injectable 25 Gram(s) IV Push once  enoxaparin Injectable 40 milliGRAM(s) SubCutaneous daily  glucagon  Injectable 1 milliGRAM(s) IntraMuscular once  insulin lispro (ADMELOG) corrective regimen sliding scale   SubCutaneous three times a day before meals  lisinopril 5 milliGRAM(s) Oral daily  metoprolol succinate ER 25 milliGRAM(s) Oral daily  nicotine - 21 mG/24Hr(s) Patch 1 patch Transdermal daily  sodium chloride 0.9%. 900 milliLiter(s) (150 mL/Hr) IV Continuous <Continuous>    MEDICATIONS  (PRN):    Antiplatelet therapy:   none                        Last dose/amt:    Allergies    No Known Allergies    Intolerances    SOCIAL HISTORY:  Smoker: [ ] Yes current  ETOH use: [ ] Yes  social            FREQUENCY / QUANTITY:  Ilicit Drug use:   [ ] No  Occupation: retired  Lives with: wife  Assisted device use: no    FAMILY HISTORY:  No pertinent family history in first degree relatives    Review of Systems  CONSTITUTIONAL: no fever, chills or night sweats]   NEURO:  denies seizures, paralysis or paresthesias                                                                                EYES: wears glasses, no double vision, no blurry vision                                                                          ENMT: no difficulty hearing, vertigo, dysphagia, epistaxis recent dental work [ ]                                      CV:  denies chest pain, CABRALES or palpitations at current time                                                                                            RESPIRATORY:  no cough or hemoptysis                                                                 GI:  no nausea, vomiting, constipation or diarrhea   : denies dysuria, hematuria, incontinence or retention                                                                                           MUSKULOSKELETAL:  denies joint swelling or muscle weakness  PSYCH:  denies dementia, depresion, anxiety   ENDOCRINE:  cold intolerance[ ] heat intolerance[ ] polydipsia[ ]                                                                                                                                                                                                PHYSICAL EXAM  Vital Signs Last 24 Hrs  T(C): 35.9 (29 Apr 2021 02:34), Max: 36.6 (28 Apr 2021 20:53)  T(F): 96.7 (29 Apr 2021 02:34), Max: 97.8 (28 Apr 2021 20:53)  HR: 73 (29 Apr 2021 02:34) (73 - 78)  BP: 158/68 (29 Apr 2021 02:34) (158/68 - 174/80)  RR: 18 (29 Apr 2021 02:34) (18 - 18)  SpO2: 96% (29 Apr 2021 08:00) (96% - 98%)  Right arm bp: Left arm bp;    CONSTITUTIONAL:    well nourished, well developed, overweight in NAD                                                                       Neuro: oriented to person/place & time with no focal motor or sensory  deficits     Eyes: PERRLA, EOMI, no nystagmus, sclera anicteric  ENT:  nasal/oral mucosa with absence of cyanosis, fair dentition  Neck: no jugular vein distention, trachea midline, no goiter   Chest: bilatertal breath sounds with good air movement absence of wheezes, rales, or rhonchi                                                                           CV:  RSR, S1S2, no significant murmur appreciated  Carotids: No Bruits  GI:  soft, non-tender non-distended, + bowel sounds                                                                                                          Extremities:  no evidence of cyanosis or deformity, no pedal edema Edema  Extremity Pulses: right / left:  femorals 2+/ 2+; DP's 2+/2+; radials 2+/2+    negative Allens  SKIN : no rashes                                                          LABS:                        13.6   10.03 )-----------( 217      ( 29 Apr 2021 09:00 )             41.9     04-29    140  |  106  |  22<H>  ----------------------------<  134<H>  4.6   |  22  |  1.1    Ca    9.4      29 Apr 2021 09:00  Phos  3.4     04-29  Mg     1.9     04-29    TPro  6.6  /  Alb  4.4  /  TBili  0.5  /  DBili  x   /  AST  15  /  ALT  14  /  AlkPhos  64  04-29    PT/INR - ( 29 Apr 2021 09:00 )   PT: 11.80 sec;   INR: 1.03 ratio       PTT - ( 29 Apr 2021 09:00 )  PTT:30.3 sec    CARDIAC MARKERS ( 29 Apr 2021 09:00 )  x     / <0.01 ng/mL / x     / x     / x      CARDIAC MARKERS ( 28 Apr 2021 21:46 )  x     / <0.01 ng/mL / x     / x     / x        COVID-19: NotDetec (04-28-21 @ 21:46)      Cardiac Cath: x ] right radial artery  [ x] right femoral artery and vein    HEMOSTASIS:    [x ] D-Stat for radial access  [ ] Perclose  [ x] Manual compression    LEFT HEART CATHETERIZATION                                    Left main: mild disease  LAD: 80% diffuse calcified lesion in ostial/prox  segments  Left Circumflex:  Right Coronary Artery: large vessel , 70% lesion in mid segment    RIGHT HEART CATHETERIZATION  PA: 30/9/18  PCW: 11mmHg  CO/CI: 5.65  CURRY by Hakki formula : 0.88cm2    POST-OP DIAGNOSIS  2 vessel CAD. severe AS .      TTE / NATHANIEL: pending      STS Score:    AVR + CAB  Risk of Mortality:  1.921%  Renal Failure:  2.429%  Permanent Stroke:  2.217%  Prolonged Ventilation:  11.698%  DSW Infection:  0.314%  Reoperation:  3.116%  Morbidity or Mortality:  16.028%  Short Length of Stay:  28.946%  Long Length of Stay:  8.199%      Assessment/ Plan:  74 yo M smoker with PMH of DM, HTN, HLD, c/o progressive dyspnea underwent stress that was markedly +, came to ER for worsening symptoms. Troponins negative subsequent cc revealed multi-vessel CAD with aortic stenosis.  Will need myocardial revascularization, awaiting TTE for confirmation of AS.  Start pre-op foe AVR CABG  Patient needs:       CT chest without contrast      Carotid duplex      PFT's      TTE      HgB A1c      Thyroid panel                 Surgeon: /Kayode/ Darian    Consult requesting by: Darrell Collins    HISTORY OF PRESENT ILLNESS:  74 yo male hx of DM, AS, HTN/HLD/heavy smoker presented c/o dyspnea on exertion for few weeks. Seen by PMD had grossly positive nuclear stress as outpatient few days ago. (decreased perfusion/wall motion to inferior wall). SOB on exertion worsened so he came to ED for evaluation. He denied chest pain/diaphoresis assoc with SOB. He denies fever/chill/coughing/abd pain/n/v/d and urinary sxs. Cardiac catheterization revealed multi-vessel disease. CTS consulted for myocardial revascularization via CABG and evaluation of  AS      Home Medications:  atorvastatin 40 mg oral tablet: 1 tab(s) orally once a day (28 Apr 2021 23:38)  lisinopril 5 mg oral tablet: 1 tab(s) orally once a day (28 Apr 2021 23:38)  metFORMIN 500 mg oral tablet: 1 tab(s) orally 2 times a day (28 Apr 2021 23:38)  METOPROLOL SUCCINATE ER  25 MG TB24: 1 tab(s) orally once a day (28 Apr 2021 23:38)    NYHA functional class    [ ] Class I (no limitation) [X ] Class II (slight limitation) [ ] Class III (marked limitation) [ ] Class IV (symptoms at rest)    PAST MEDICAL & SURGICAL HISTORY:  HTN (hypertension)  DM  HLD (hyperlipidemia)  + tobacco    No significant past surgical history    MEDICATIONS  (STANDING):  atorvastatin 40 milliGRAM(s) Oral at bedtime  chlorhexidine 4% Liquid 1 Application(s) Topical daily  dextrose 40% Gel 15 Gram(s) Oral once  dextrose 5%. 1000 milliLiter(s) (50 mL/Hr) IV Continuous <Continuous>  dextrose 50% Injectable 25 Gram(s) IV Push once  enoxaparin Injectable 40 milliGRAM(s) SubCutaneous daily  glucagon  Injectable 1 milliGRAM(s) IntraMuscular once  insulin lispro (ADMELOG) corrective regimen sliding scale   SubCutaneous three times a day before meals  lisinopril 5 milliGRAM(s) Oral daily  metoprolol succinate ER 25 milliGRAM(s) Oral daily  nicotine - 21 mG/24Hr(s) Patch 1 patch Transdermal daily  sodium chloride 0.9%. 900 milliLiter(s) (150 mL/Hr) IV Continuous <Continuous>    MEDICATIONS  (PRN):    Antiplatelet therapy:   none                        Last dose/amt:    Allergies    No Known Allergies    Intolerances    SOCIAL HISTORY:  Smoker: [ ] Yes current  ETOH use: [ ] Yes  social            < 1 drink a week  Ilicit Drug use:   [ ] No  Occupation: retired  Lives with: wife  Assisted device use: no    FAMILY HISTORY:  No pertinent family history in first degree relatives    Review of Systems  CONSTITUTIONAL: no fever, chills or night sweats]   NEURO:  denies seizures, paralysis or paresthesias                                                                                EYES: wears glasses, no double vision, no blurry vision                                                                          ENMT: no difficulty hearing, vertigo, dysphagia, epistaxis , no teeth                                   CV:  denies chest pain, CABRALES or palpitations at current time                                                                                            RESPIRATORY:  no cough or hemoptysis                                                                 GI:  no nausea, vomiting, constipation or diarrhea   : denies dysuria, hematuria, incontinence or retention                                                                                           MUSKULOSKELETAL:  denies joint swelling or muscle weakness  PSYCH:  denies dementia, depresion, anxiety   ENDOCRINE:  cold intolerance[ ] heat intolerance[ ] polydipsia[ ]                                                                                                                                                                                                PHYSICAL EXAM  Vital Signs Last 24 Hrs  T(C): 35.9 (29 Apr 2021 02:34), Max: 36.6 (28 Apr 2021 20:53)  T(F): 96.7 (29 Apr 2021 02:34), Max: 97.8 (28 Apr 2021 20:53)  HR: 73 (29 Apr 2021 02:34) (73 - 78)  BP: 158/68 (29 Apr 2021 02:34) (158/68 - 174/80)  RR: 18 (29 Apr 2021 02:34) (18 - 18)  SpO2: 96% (29 Apr 2021 08:00) (96% - 98%)  Right arm bp: Left arm bp;    CONSTITUTIONAL:    well nourished, well developed, overweight in NAD                                                                       Neuro: oriented to person/place & time with no focal motor or sensory  deficits     Eyes: PERRLA, EOMI, no nystagmus, sclera anicteric  ENT:  nasal/oral mucosa with absence of cyanosis, fair dentition  Neck: no jugular vein distention, trachea midline, no goiter   Chest: bilatertal breath sounds with good air movement absence of wheezes, rales, or rhonchi                                                                           CV:  RSR, S1S2, no significant murmur appreciated  GI:  soft, non-tender non-distended, + bowel sounds                                                                                                          Extremities:  no evidence of cyanosis or deformity, no pedal edema   Extremity Pulses: right / left:  femorals pressure dressing/ 2+; DP's 1+/1+; radials pressure dressing/2+      SKIN : no rashes                                                          LABS:                        13.6   10.03 )-----------( 217      ( 29 Apr 2021 09:00 )             41.9     04-29    140  |  106  |  22<H>  ----------------------------<  134<H>  4.6   |  22  |  1.1    Ca    9.4      29 Apr 2021 09:00  Phos  3.4     04-29  Mg     1.9     04-29    TPro  6.6  /  Alb  4.4  /  TBili  0.5  /  DBili  x   /  AST  15  /  ALT  14  /  AlkPhos  64  04-29    PT/INR - ( 29 Apr 2021 09:00 )   PT: 11.80 sec;   INR: 1.03 ratio       PTT - ( 29 Apr 2021 09:00 )  PTT:30.3 sec    CARDIAC MARKERS ( 29 Apr 2021 09:00 )  x     / <0.01 ng/mL / x     / x     / x      CARDIAC MARKERS ( 28 Apr 2021 21:46 )  x     / <0.01 ng/mL / x     / x     / x        COVID-19: NotDetec (04-28-21 @ 21:46)      Cardiac Cath: x ] right radial artery  [ x] right femoral artery and vein    HEMOSTASIS:    [x ] D-Stat for radial access  [ ] Perclose  [ x] Manual compression    LEFT HEART CATHETERIZATION                                    Left main: mild disease  LAD: 80% diffuse calcified lesion in ostial/prox  segments  Left Circumflex:  Right Coronary Artery: large vessel , 70% lesion in mid segment    RIGHT HEART CATHETERIZATION  PA: 30/9/18  PCW: 11mmHg  CO/CI: 5.65  CURRY by Hakki formula : 0.88cm2    POST-OP DIAGNOSIS  2 vessel CAD. severe AS .    TTE / NATHANIEL: pending    STS Score:    AVR + CAB  Risk of Mortality:  1.921%  Renal Failure:  2.429%  Permanent Stroke:  2.217%  Prolonged Ventilation:  11.698%  DSW Infection:  0.314%  Reoperation:  3.116%  Morbidity or Mortality:  16.028%  Short Length of Stay:  28.946%  Long Length of Stay:  8.199%      Assessment/ Plan:  74 yo M smoker with PMH of DM, HTN, HLD, c/o progressive dyspnea underwent stress that was markedly +, came to ER for worsening symptoms. Troponins negative subsequent cc revealed multi-vessel CAD with aortic stenosis CURRY 0.88.  Will need myocardial revascularization, awaiting TTE for confirmation of AS.  Start pre-op foe AVR CABG  Patient needs:       CT chest without contrast      Carotid duplex      PFT's      TTE      HgB A1c      Thyroid panel      Dental not needed

## 2021-04-30 ENCOUNTER — TRANSCRIPTION ENCOUNTER (OUTPATIENT)
Age: 76
End: 2021-04-30

## 2021-04-30 ENCOUNTER — RESULT REVIEW (OUTPATIENT)
Age: 76
End: 2021-04-30

## 2021-04-30 VITALS — HEART RATE: 77 BPM | TEMPERATURE: 99 F | RESPIRATION RATE: 29 BRPM | OXYGEN SATURATION: 97 %

## 2021-04-30 DIAGNOSIS — I35.0 NONRHEUMATIC AORTIC (VALVE) STENOSIS: ICD-10-CM

## 2021-04-30 DIAGNOSIS — I65.29 OCCLUSION AND STENOSIS OF UNSPECIFIED CAROTID ARTERY: ICD-10-CM

## 2021-04-30 LAB
ALBUMIN SERPL ELPH-MCNC: 4.3 G/DL — SIGNIFICANT CHANGE UP (ref 3.5–5.2)
ALP SERPL-CCNC: 66 U/L — SIGNIFICANT CHANGE UP (ref 30–115)
ALT FLD-CCNC: 13 U/L — SIGNIFICANT CHANGE UP (ref 0–41)
ANION GAP SERPL CALC-SCNC: 12 MMOL/L — SIGNIFICANT CHANGE UP (ref 7–14)
APTT BLD: 30.7 SEC — SIGNIFICANT CHANGE UP (ref 27–39.2)
AST SERPL-CCNC: 15 U/L — SIGNIFICANT CHANGE UP (ref 0–41)
BILIRUB SERPL-MCNC: 0.6 MG/DL — SIGNIFICANT CHANGE UP (ref 0.2–1.2)
BUN SERPL-MCNC: 18 MG/DL — SIGNIFICANT CHANGE UP (ref 10–20)
CALCIUM SERPL-MCNC: 9.1 MG/DL — SIGNIFICANT CHANGE UP (ref 8.5–10.1)
CHLORIDE SERPL-SCNC: 106 MMOL/L — SIGNIFICANT CHANGE UP (ref 98–110)
CO2 SERPL-SCNC: 22 MMOL/L — SIGNIFICANT CHANGE UP (ref 17–32)
COVID-19 SPIKE DOMAIN AB INTERP: NEGATIVE — SIGNIFICANT CHANGE UP
COVID-19 SPIKE DOMAIN ANTIBODY RESULT: 0.4 U/ML — SIGNIFICANT CHANGE UP
CREAT SERPL-MCNC: 1 MG/DL — SIGNIFICANT CHANGE UP (ref 0.7–1.5)
GLUCOSE BLDC GLUCOMTR-MCNC: 110 MG/DL — HIGH (ref 70–99)
GLUCOSE BLDC GLUCOMTR-MCNC: 149 MG/DL — HIGH (ref 70–99)
GLUCOSE SERPL-MCNC: 116 MG/DL — HIGH (ref 70–99)
HCT VFR BLD CALC: 40.3 % — LOW (ref 42–52)
HGB BLD-MCNC: 13.4 G/DL — LOW (ref 14–18)
MAGNESIUM SERPL-MCNC: 1.8 MG/DL — SIGNIFICANT CHANGE UP (ref 1.8–2.4)
MCHC RBC-ENTMCNC: 29.4 PG — SIGNIFICANT CHANGE UP (ref 27–31)
MCHC RBC-ENTMCNC: 33.3 G/DL — SIGNIFICANT CHANGE UP (ref 32–37)
MCV RBC AUTO: 88.4 FL — SIGNIFICANT CHANGE UP (ref 80–94)
NRBC # BLD: 0 /100 WBCS — SIGNIFICANT CHANGE UP (ref 0–0)
PLATELET # BLD AUTO: 192 K/UL — SIGNIFICANT CHANGE UP (ref 130–400)
POTASSIUM SERPL-MCNC: 4.6 MMOL/L — SIGNIFICANT CHANGE UP (ref 3.5–5)
POTASSIUM SERPL-SCNC: 4.6 MMOL/L — SIGNIFICANT CHANGE UP (ref 3.5–5)
PROT SERPL-MCNC: 6.4 G/DL — SIGNIFICANT CHANGE UP (ref 6–8)
RBC # BLD: 4.56 M/UL — LOW (ref 4.7–6.1)
RBC # FLD: 12.9 % — SIGNIFICANT CHANGE UP (ref 11.5–14.5)
SARS-COV-2 IGG+IGM SERPL QL IA: 0.4 U/ML — SIGNIFICANT CHANGE UP
SARS-COV-2 IGG+IGM SERPL QL IA: NEGATIVE — SIGNIFICANT CHANGE UP
SODIUM SERPL-SCNC: 140 MMOL/L — SIGNIFICANT CHANGE UP (ref 135–146)
T3FREE SERPL-MCNC: 2.83 PG/ML — SIGNIFICANT CHANGE UP (ref 1.8–4.6)
T4 FREE SERPL-MCNC: 1.1 NG/DL — SIGNIFICANT CHANGE UP (ref 0.9–1.8)
TSH SERPL-MCNC: 3.23 UIU/ML — SIGNIFICANT CHANGE UP (ref 0.27–4.2)
WBC # BLD: 11.6 K/UL — HIGH (ref 4.8–10.8)
WBC # FLD AUTO: 11.6 K/UL — HIGH (ref 4.8–10.8)

## 2021-04-30 PROCEDURE — 88173 CYTOPATH EVAL FNA REPORT: CPT | Mod: 26

## 2021-04-30 PROCEDURE — 88305 TISSUE EXAM BY PATHOLOGIST: CPT | Mod: 26

## 2021-04-30 PROCEDURE — 88342 IMHCHEM/IMCYTCHM 1ST ANTB: CPT | Mod: 26,59

## 2021-04-30 PROCEDURE — 71045 X-RAY EXAM CHEST 1 VIEW: CPT | Mod: 26

## 2021-04-30 PROCEDURE — 31652 BRONCH EBUS SAMPLNG 1/2 NODE: CPT

## 2021-04-30 PROCEDURE — 36620 INSERTION CATHETER ARTERY: CPT

## 2021-04-30 PROCEDURE — 88341 IMHCHEM/IMCYTCHM EA ADD ANTB: CPT | Mod: 26,59

## 2021-04-30 PROCEDURE — 70553 MRI BRAIN STEM W/O & W/DYE: CPT | Mod: 26

## 2021-04-30 PROCEDURE — 88344 IMHCHEM/IMCYTCHM EA MLT ANTB: CPT | Mod: 26

## 2021-04-30 RX ORDER — ASPIRIN/CALCIUM CARB/MAGNESIUM 324 MG
1 TABLET ORAL
Qty: 30 | Refills: 0
Start: 2021-04-30 | End: 2021-05-29

## 2021-04-30 RX ADMIN — Medication 1 PATCH: at 08:47

## 2021-04-30 RX ADMIN — CHLORHEXIDINE GLUCONATE 1 APPLICATION(S): 213 SOLUTION TOPICAL at 18:43

## 2021-04-30 RX ADMIN — Medication 1 PATCH: at 18:43

## 2021-04-30 RX ADMIN — Medication 600 MILLIGRAM(S): at 18:44

## 2021-04-30 NOTE — DISCHARGE NOTE PROVIDER - NSDCFUADDINST_GEN_ALL_CORE_FT
Activities/Restrictions  1.	Do not – drive, lift, pull or push anything over 20 pounds .    2.	Walk every day for 15-20 minutes, stop for chest discomfort or shortness of breathDo  walking exercises every dayincentive spirometer 10 times every hour while awake    Contact your Physician promptly if:    1.	Progressive shortness of breath or increasing difficulty with breathing when lying down.  3.	Excessive nausea, vomiting, diarrhea or coughing.  4.	Increase swelling of legs that does not resolve with leg elevation.  5.	Chest pain that spreads to arms, jaw or back or sudden development of numbness, weakness, difficulty speaking or facial droop – Call 911.  6.	Diabetics who are unable to keep finger stick glucose under 150 for three consecutive readings.  Instructions:  1.	 Keep a daily log for Temperature, pulse, blood pressure, and weight twice a day and Glucose if diabetic with each meal. Call office for Temp > 101, pulse greater than 110 or less than 55, BP first # greater than 160 or less than 100, 3 pound weight gain in 1 day or 5 pounds in 3 days       Activities/Restrictions  1.	Do not – drive, lift, pull or push anything over 20 pounds .    2.	Walk every day for 15-20 minutes, stop for chest discomfort or shortness of breath. Do  walking exercises every day incentive spirometer 10 times every hour while awake    Contact your Physician promptly if:    1.	Progressive shortness of breath or increasing difficulty with breathing when lying down.  3.	Excessive nausea, vomiting, diarrhea or coughing.  4.	Increase swelling of legs that does not resolve with leg elevation.  5.	Chest pain that spreads to arms, jaw or back or sudden development of numbness, weakness, difficulty speaking or facial droop – Call 911.  6.	Diabetics who are unable to keep finger stick glucose under 150 for three consecutive readings.  Instructions:  1.	 Keep a daily log for Temperature, pulse, blood pressure, and weight twice a day and Glucose if diabetic with each meal. Call office for Temp > 101, pulse greater than 110 or less than 55, BP first # greater than 160 or less than 100, 3 pound weight gain in 1 day or 5 pounds in 3 days

## 2021-04-30 NOTE — PROCEDURE NOTE - NSPROCDETAILS_GEN_ALL_CORE
location identified, draped/prepped, sterile technique used, needle inserted/introduced/positive blood return obtained via catheter/connected to a pressurized flush line/sutured in place/Seldinger technique/all materials/supplies accounted for at end of procedure

## 2021-04-30 NOTE — DISCHARGE NOTE PROVIDER - HOSPITAL COURSE
74 yo M smoker with PMH of DM, HTN, HLD, c/o progressive dyspnea underwent stress that was markedly +, came to ER for worsening symptoms of dyspnea and chest discomfort. Troponins negative subsequent cc revealed multi-vessel CAD with aortic stenosis CURRY 0.88. Pre-op work-up proceeded.  TTE Echo Complete w/o Contrast w/ Doppler (04.29.21 @ 14:11) >    - LV Ejection Fraction by Bose's Method with a biplane EF of 64 %.   - No evidence of mitral valve regurgitation.  - Mild tricuspid regurgitation.  - Peak transaortic gradient equals 42.3 mmHg, mean transaortic gradient equals 24.8 mmHg, the calculated aortic valve area equals 1.12 cm² by the continuity equation consistent with moderate aortic stenosis.     CT Chest No Cont (04.29.21 @ 17:46) >    Approximate 4 x 2.9 cm right paratracheal soft tissue lesion (HU 64, series 4/112). Differential diagnosis includes adenopathy versus proteinaceous foregut cyst.    Subcentimeter left paratracheal lymph nodes (4/114).  Lobulated solid 2.7 cm right upper lobe nodule, suspicious for primary lung neoplasia. (4/92).  5 mm solid nodule posterior left upper lobe (series 4/93).    Extensive calcification aortic valve and moderate coronary artery calcifications. Moderate aortic arch calcifications.  Minimal dilatation mid ascending aorta: 4.1 cm (2/35).     VA Duplex Carotid, Bilat (04.29.21 @ 14:15) >  Greater than 80% stenosis of the right internal carotid artery - with velocities > 400  60-79% moderate stenosis of the left internal carotid artery.    Patient made NPO after CT finding and under went   Bronchoscopy with Endobronchial ultrasound with biopsy of lymph node of mediastinum   · Operative Findings	R4 lymph node: 3x2 cm, FNA, ROJAS: carcinoma    Case discussed with Dr. Collins and plan made for MRI of brain prior to discharge to evaluate for metastasis. He will f/u in office in 1 week and have PET scan arranged.   Based on oncologic finding determination will be made to pursue cardiac surgery or opt for medical treatment. Dr. Collins will address carotid stenosis as an outpatient as he is asymptomatic.       74 yo M smoker with PMH of DM, HTN, HLD, c/o progressive dyspnea underwent stress that was markedly +, came to ER for worsening symptoms of dyspnea and chest discomfort. Troponins negative subsequent cc revealed multi-vessel CAD with aortic stenosis CURRY 0.88. Pre-op work-up proceeded.  TTE Echo Complete w/o Contrast w/ Doppler (04.29.21 @ 14:11) >    - LV Ejection Fraction by Bose's Method with a biplane EF of 64 %.   - No evidence of mitral valve regurgitation.  - Mild tricuspid regurgitation.  - Peak transaortic gradient equals 42.3 mmHg, mean transaortic gradient equals 24.8 mmHg, the calculated aortic valve area equals 1.12 cm² by the continuity equation consistent with moderate aortic stenosis.     CT Chest No Cont (04.29.21 @ 17:46) >    Approximate 4 x 2.9 cm right paratracheal soft tissue lesion (HU 64, series 4/112). Differential diagnosis includes adenopathy versus proteinaceous foregut cyst.    Subcentimeter left paratracheal lymph nodes (4/114).  Lobulated solid 2.7 cm right upper lobe nodule, suspicious for primary lung neoplasia. (4/92).  5 mm solid nodule posterior left upper lobe (series 4/93).    Extensive calcification aortic valve and moderate coronary artery calcifications. Moderate aortic arch calcifications.  Minimal dilatation mid ascending aorta: 4.1 cm (2/35).     VA Duplex Carotid, Bilat (04.29.21 @ 14:15) >  Greater than 80% stenosis of the right internal carotid artery - with velocities > 400  60-79% moderate stenosis of the left internal carotid artery.    Patient made NPO after CT finding and under went   Bronchoscopy with Endobronchial ultrasound with biopsy of lymph node of mediastinum   · Operative Findings	R4 lymph node: 3x2 cm, FNA, ROJAS: carcinoma    Case discussed with Dr. Collins and plan made for MRI of brain prior to discharge to evaluate for metastasis. He will f/u in office in 1 week to have PET scan arranged.   Based on oncologic staging determination will be made on whether too pursue cardiac surgery or opt for medical treatment. Dr. Collins will address carotid stenosis as an outpatient as he is asymptomatic.

## 2021-04-30 NOTE — BRIEF OPERATIVE NOTE - NSICDXBRIEFPROCEDURE_GEN_ALL_CORE_FT
PROCEDURES:  Bronchoscopy in adult 30-Apr-2021 12:44:35  Benny Aguero  Endobronchial ultrasound with biopsy of lymph node of mediastinum 30-Apr-2021 12:45:04  Benny Aguero

## 2021-04-30 NOTE — DISCHARGE NOTE NURSING/CASE MANAGEMENT/SOCIAL WORK - PATIENT PORTAL LINK FT
You can access the FollowMyHealth Patient Portal offered by [facility] by registering at the following website: [email or web address]. By joining [email or web address]’s FollowMyHealth portal, you will also be able to view your health information using other applications (apps) compatible with our system.

## 2021-04-30 NOTE — DISCHARGE NOTE PROVIDER - CARE PROVIDER_API CALL
Ayush Collins [facility]  [address] [place] [address]  Phone: [phone]  Fax: [phone]  Follow Up Time: 1 week    Benny Alicea)  Thoracic and Cardiac Surgery  [address] [place] [address]  Phone: [phone]  Fax: [phone]  Follow Up Time: 1 week   Ayush Collins [facility]  [address] [place] [address]  Phone: [phone]  Fax: [phone]  Follow Up Time: 1 week    Leonidas Farmer)  Surgery; Thoracic and Cardiac Surgery  [address] [place] [address], [address]  [place] [address]  Phone: [phone]  Fax: [phone]  Follow Up Time: 1 week

## 2021-04-30 NOTE — DISCHARGE NOTE PROVIDER - NSDCFUADDAPPT_GEN_ALL_CORE_FT
Please call [phone] to schedule an appointment to see Dr. Nixon Mcnamara within 7-14 days May 5th Please call [phone] to schedule an appointment to see Dr. Farmer on May 5th.

## 2021-04-30 NOTE — DISCHARGE NOTE PROVIDER - NSDCCPCAREPLAN_GEN_ALL_CORE_FT
PRINCIPAL DISCHARGE DIAGNOSIS  Diagnosis: ACS (acute coronary syndrome)  Assessment and Plan of Treatment:       SECONDARY DISCHARGE DIAGNOSES  Diagnosis: Carcinoma of lung  Assessment and Plan of Treatment:

## 2021-04-30 NOTE — DISCHARGE NOTE PROVIDER - PROVIDER TOKENS
PROVIDER:[TOKEN:[[id number]],FOLLOWUP:[1 week]],PROVIDER:[TOKEN:[[id number]],FOLLOWUP:[1 week]] PROVIDER:[TOKEN:[[id number]],FOLLOWUP:[1 week]],PROVIDER:[TOKEN:[[id number]],FOLLOWUP:[1 week]]

## 2021-04-30 NOTE — DISCHARGE NOTE PROVIDER - CARE PROVIDERS DIRECT ADDRESSES
,DirectAddress_Unknown,DirectAddress_Unknown ,DirectAddress_Unknown,[email or web address]

## 2021-04-30 NOTE — DISCHARGE NOTE PROVIDER - NSDCCPTREATMENT_GEN_ALL_CORE_FT
PRINCIPAL PROCEDURE  Procedure: Bronchoscopy in adult  Findings and Treatment:       SECONDARY PROCEDURE  Procedure: Endobronchial ultrasound with biopsy of lymph node of mediastinum  Findings and Treatment:

## 2021-05-01 LAB
GRAM STN FLD: SIGNIFICANT CHANGE UP
NIGHT BLUE STAIN TISS: SIGNIFICANT CHANGE UP
SPECIMEN SOURCE: SIGNIFICANT CHANGE UP
SPECIMEN SOURCE: SIGNIFICANT CHANGE UP

## 2021-05-03 PROBLEM — I10 ESSENTIAL (PRIMARY) HYPERTENSION: Chronic | Status: ACTIVE | Noted: 2021-04-28

## 2021-05-03 PROBLEM — E78.5 HYPERLIPIDEMIA, UNSPECIFIED: Chronic | Status: ACTIVE | Noted: 2021-04-28

## 2021-05-04 PROBLEM — R91.8 LUNG MASS: Status: RESOLVED | Noted: 2021-05-04 | Resolved: 2021-05-04

## 2021-05-04 LAB
-  CEFTRIAXONE: SIGNIFICANT CHANGE UP
-  CLINDAMYCIN: SIGNIFICANT CHANGE UP
-  ERYTHROMYCIN: SIGNIFICANT CHANGE UP
-  LEVOFLOXACIN: SIGNIFICANT CHANGE UP
-  PENICILLIN: SIGNIFICANT CHANGE UP
-  TRIMETHOPRIM/SULFAMETHOXAZOLE: SIGNIFICANT CHANGE UP
-  VANCOMYCIN: SIGNIFICANT CHANGE UP
CULTURE RESULTS: SIGNIFICANT CHANGE UP
METHOD TYPE: SIGNIFICANT CHANGE UP
METHOD TYPE: SIGNIFICANT CHANGE UP
NON-GYNECOLOGICAL CYTOLOGY STUDY: SIGNIFICANT CHANGE UP
ORGANISM # SPEC MICROSCOPIC CNT: SIGNIFICANT CHANGE UP
SPECIMEN SOURCE: SIGNIFICANT CHANGE UP

## 2021-05-04 RX ORDER — ASPIRIN 81 MG
81 TABLET, DELAYED RELEASE (ENTERIC COATED) ORAL
Refills: 0 | Status: ACTIVE | COMMUNITY

## 2021-05-04 RX ORDER — METOPROLOL SUCCINATE 25 MG/1
25 TABLET, EXTENDED RELEASE ORAL
Refills: 0 | Status: ACTIVE | COMMUNITY

## 2021-05-04 RX ORDER — ATORVASTATIN CALCIUM 40 MG/1
40 TABLET, FILM COATED ORAL
Refills: 0 | Status: ACTIVE | COMMUNITY

## 2021-05-04 RX ORDER — LISINOPRIL 5 MG/1
5 TABLET ORAL
Refills: 0 | Status: ACTIVE | COMMUNITY

## 2021-05-04 RX ORDER — METFORMIN HYDROCHLORIDE 500 MG/1
500 TABLET, COATED ORAL
Refills: 0 | Status: ACTIVE | COMMUNITY

## 2021-05-05 ENCOUNTER — APPOINTMENT (OUTPATIENT)
Dept: CARDIOTHORACIC SURGERY | Facility: CLINIC | Age: 76
End: 2021-05-05
Payer: MEDICARE

## 2021-05-05 ENCOUNTER — NON-APPOINTMENT (OUTPATIENT)
Age: 76
End: 2021-05-05

## 2021-05-05 VITALS
RESPIRATION RATE: 17 BRPM | TEMPERATURE: 98.6 F | HEIGHT: 71 IN | OXYGEN SATURATION: 96 % | BODY MASS INDEX: 30.8 KG/M2 | DIASTOLIC BLOOD PRESSURE: 73 MMHG | HEART RATE: 70 BPM | WEIGHT: 220 LBS | SYSTOLIC BLOOD PRESSURE: 169 MMHG

## 2021-05-05 VITALS — DIASTOLIC BLOOD PRESSURE: 75 MMHG | SYSTOLIC BLOOD PRESSURE: 157 MMHG

## 2021-05-05 DIAGNOSIS — R91.8 OTHER NONSPECIFIC ABNORMAL FINDING OF LUNG FIELD: ICD-10-CM

## 2021-05-05 PROCEDURE — 99072 ADDL SUPL MATRL&STAF TM PHE: CPT

## 2021-05-05 PROCEDURE — 99214 OFFICE O/P EST MOD 30 MIN: CPT

## 2021-05-06 DIAGNOSIS — E11.9 TYPE 2 DIABETES MELLITUS WITHOUT COMPLICATIONS: ICD-10-CM

## 2021-05-06 DIAGNOSIS — C77.1 SECONDARY AND UNSPECIFIED MALIGNANT NEOPLASM OF INTRATHORACIC LYMPH NODES: ICD-10-CM

## 2021-05-06 DIAGNOSIS — C34.11 MALIGNANT NEOPLASM OF UPPER LOBE, RIGHT BRONCHUS OR LUNG: ICD-10-CM

## 2021-05-06 DIAGNOSIS — R07.9 CHEST PAIN, UNSPECIFIED: ICD-10-CM

## 2021-05-06 DIAGNOSIS — C78.1 SECONDARY MALIGNANT NEOPLASM OF MEDIASTINUM: ICD-10-CM

## 2021-05-06 DIAGNOSIS — F17.210 NICOTINE DEPENDENCE, CIGARETTES, UNCOMPLICATED: ICD-10-CM

## 2021-05-06 DIAGNOSIS — I65.23 OCCLUSION AND STENOSIS OF BILATERAL CAROTID ARTERIES: ICD-10-CM

## 2021-05-06 DIAGNOSIS — I10 ESSENTIAL (PRIMARY) HYPERTENSION: ICD-10-CM

## 2021-05-06 DIAGNOSIS — E78.5 HYPERLIPIDEMIA, UNSPECIFIED: ICD-10-CM

## 2021-05-06 DIAGNOSIS — I24.9 ACUTE ISCHEMIC HEART DISEASE, UNSPECIFIED: ICD-10-CM

## 2021-05-06 DIAGNOSIS — I25.10 ATHEROSCLEROTIC HEART DISEASE OF NATIVE CORONARY ARTERY WITHOUT ANGINA PECTORIS: ICD-10-CM

## 2021-05-06 DIAGNOSIS — Z79.84 LONG TERM (CURRENT) USE OF ORAL HYPOGLYCEMIC DRUGS: ICD-10-CM

## 2021-05-06 DIAGNOSIS — I35.0 NONRHEUMATIC AORTIC (VALVE) STENOSIS: ICD-10-CM

## 2021-05-06 DIAGNOSIS — I25.84 CORONARY ATHEROSCLEROSIS DUE TO CALCIFIED CORONARY LESION: ICD-10-CM

## 2021-05-07 NOTE — REASON FOR VISIT
[Follow-Up: _____] : a [unfilled] follow-up visit [FreeTextEntry1] : CAD/AS/Lung Nodule/Mediastinal Lymphadenopathy

## 2021-05-07 NOTE — HISTORY OF PRESENT ILLNESS
[FreeTextEntry1] : Mr. Vasquez is a 76 y/o M, current smoker, PMH of DM, HTN, HLD and AS, presented for elective Cardiac Catheterization after having a positive stress test as an outpatient and was found to have multivessel disease.  His initial symptoms included worsening SOB.  He can walk less than 1 block before becoming SOB which has worsened.  He denies any palpitations or chest pain/dizziness/syncope,  NYHA Class III. He was consulted for AVR/CABG.  Preop CT Chest showed a lobulated solid 2.7 cm RUL nodule, and a 5 mm ANIA nodule, and a 4.0 x 2.9 cm right paratracheal soft tissue lesion.  S/P Bronch/EBUS 4/30/2021- Prelim Bronch/EBUS path showed carcinoma.  Brain MRI negative for metastatic disease.  Carotid Duplex showed greater than 80% stenosis of the right internal carotid artery.  60-79% moderate stenosis of the left internal carotid artery. Will need consult with Vascular surgery.  He is here for discussion of results and possible planning of surgery.\par \par Current Smoker- 1 PPD X 60 years- quit April 2021\par Just quit smoking while admitted to hospital\par Presents with his son, Primo \par \par His healthcare teams is as follows:\par PMD/Cardio: Dr. Collins\par \par \par \par

## 2021-05-07 NOTE — ASSESSMENT
[FreeTextEntry1] : Mr. Vasquez is a 76 y/o M, former smoker, PMH of DM, HTN, HLD and AS, being worked up for an AVR/CABG.  Preop CT Chest showed a lobulated solid 2.7 cm RUL nodule, and a 5 mm ANIA nodule, and a 4.0 x 2.9 cm right paratracheal soft tissue lesion.  Bronch/EBUS prelim path showed carcinoma.  He is here for discussion of results and possible planning of surgery.\par \par Plan: \par PET/CT now , if PET negative for distant metastases, will plan for CABG followed by chemotherapy\par Will plan for CABG after results of PET/CT- will need PFTS preop\par F/U after PET/CT \par Go to ER if develops worsening SOB or chest pain/palpitations or dizziness\par F/U with Vascular Surgery\par F/U with PMD for routine medical care\par \par I, Carlyn Bond [facility], am acting as scribe for \par \par I personally performed the services described in the documentation, reviewed the documentation recorded by the scribe in my presence and it accurately and completely records my words and actions.\par \par PATH SHOWED LUNG CANCER IN THE LEVEL 4 LYMPH NODE\par PET-CT PENDING, ORDERED FOR NEXT MONDAY\par ACTIVE CORONARY DISEASE, TO BE DISCUSSED WITH INTERVENTIONAL CARDIOLOIGST \par CASE DISCUSSED WITH DR. CROSS, PMD\par -FMR\par

## 2021-05-07 NOTE — PHYSICAL EXAM
[Sclera] : the sclera and conjunctiva were normal [PERRL With Normal Accommodation] : pupils were equal in size, round, and reactive to light [Extraocular Movements] : extraocular movements were intact [Neck Appearance] : the appearance of the neck was normal [Neck Cervical Mass (___cm)] : no neck mass was observed [Normal Rhythm/Effort] : normal respiratory rhythm and effort [Scattered Wheezes] : scattered wheezing was heard [Normal Rate] : normal [Rhythm Regular] : regular [Normal S1] : normal S1 [Normal S2] : normal S2 [I] : a grade 1 [Examination Of The Chest] : the chest was normal in appearance [Skin Color & Pigmentation] : normal skin color and pigmentation [Skin Turgor] : normal skin turgor [] : no rash [Cranial Nerves] : cranial nerves 2-12 were intact [Deep Tendon Reflexes (DTR)] : deep tendon reflexes were 2+ and symmetric [Sensation] : the sensory exam was normal to light touch and pinprick [Oriented To Time, Place, And Person] : oriented to person, place, and time [Impaired Insight] : insight and judgment were intact [Affect] : the affect was normal [Abnormal Walk] : normal gait [Nail Clubbing] : no clubbing  or cyanosis of the fingernails [Musculoskeletal - Swelling] : no joint swelling seen [Bowel Sounds] : normal bowel sounds [Abdomen Soft] : soft [Abdomen Tenderness] : non-tender [Acc Muscles Use] : no accessory muscle use [Air Hunger] : no air hunger [Distress] : no respiratory distress

## 2021-05-07 NOTE — DATA REVIEWED
[FreeTextEntry1] : EXAM:  CT CHEST      \par PROCEDURE DATE:  04/29/2021  \par Lungs, Pleura, and Airways: Mucoid debris within trachea. No pleural effusions or pneumothorax. Breathing artifact limits lung parenchymal assessment.\par \par Pulmonary nodules:\par Lobulated solid 2.7 cm right upper lobe nodule, suspicious for primary lung neoplasia. (4/92).\par 5 mm solid nodule posterior left upper lobe (series 4/93).\par \par Mediastinum/Lymph Nodes:Approximate 4 x 2.9 cm right paratracheal soft tissue lesion (HU 64, series 4/112). Differential diagnosis includes adenopathy versus proteinaceous foregut cyst.\par \par Subcentimeter left paratracheal lymph nodes (4/114).\par \par Heart/Great Vessels: No pericardial effusions. Extensive calcification aortic valve and moderate coronary artery calcifications. Moderate aortic arch calcifications.\par \par Minimal dilatation mid ascending aorta: 4.1 cm (2/35).\par Mid aortic arch: 3.3 cm (2/25)\par Mid descending aorta: 2.8 cm (2/34)\par Distal descending aorta: 2.8 cm (2/61).\par Abdomen: Vicarious excretion of contrast material within gallbladder. Likely residual contrast material versus small calculi kidneys. Hypoplastic right kidney.\par \par Bones and soft tissues: No suspicious osseous lesions.\par \par IMPRESSION:\par Approximate 4 x 2.9 cm right paratracheal soft tissue lesion (HU 64, series 4/112). Differential diagnosis includes adenopathy versus proteinaceous foregut cyst.\par \par Subcentimeter left paratracheal lymph nodes (4/114).\par \par Lobulated solid 2.7 cm right upper lobe nodule, suspicious for primary lung neoplasia. (4/92).\par 5 mm solid nodule posterior left upper lobe (series 4/93).\par \par Extensive calcification aortic valve and moderate coronary artery calcifications. Moderate aortic arch calcifications.\par \par Minimal dilatation mid ascending aorta: 4.1 cm (2/35).\par Mid aortic arch: 3.3 cm (2/25)\par Mid descending aorta: 2.8 cm (2/34)\par Distal descending aorta: 2.8 cm (2/61).\par \par Echocardiogram 4/29/2021:\par Summary:\par  1. LV Ejection Fraction by Bose's Method with a biplane EF of 64 %.\par  2. Moderately increased LV wall thickness.\par  3. Spectral Doppler shows impaired relaxation pattern of left ventricular myocardial filling (Grade I diastolic dysfunction).\par  4. Moderately enlarged left atrium.\par  5. Normal right atrial size.\par  6. Degenerative mitral valve.\par  7. Mild mitral annular calcification.\par  8. Mild thickening of the anterior and posterior mitral valve leaflets.\par  9. No evidence of mitral valve regurgitation.\par 10. Mild tricuspid regurgitation.\par 11. Peak transaortic gradient equals 42.3 mmHg, mean transaortic gradient equals 24.8 mmHg, the calculated aortic valve area equals 1.12 cm² by the continuity equation consistent with moderate aortic stenosis.\par \par MR Head 4/29/2021:\par IMPRESSION:\par 1.  No MRI evidence of intracranial metastatic disease.\par 2.  Mild chronic microvascular changes.\par \par Carotid Duplex 4/29/2021:\par Greater than 80% stenosis of the right internal carotid artery.\par 60-79% moderate stenosis of the left internal carotid artery.\par Vascular surgery consultation is recommended

## 2021-05-10 ENCOUNTER — OUTPATIENT (OUTPATIENT)
Dept: OUTPATIENT SERVICES | Facility: HOSPITAL | Age: 76
LOS: 1 days | Discharge: HOME | End: 2021-05-10
Payer: MEDICARE

## 2021-05-10 ENCOUNTER — RESULT REVIEW (OUTPATIENT)
Age: 76
End: 2021-05-10

## 2021-05-10 DIAGNOSIS — R91.1 SOLITARY PULMONARY NODULE: ICD-10-CM

## 2021-05-10 LAB — GLUCOSE BLDC GLUCOMTR-MCNC: 126 MG/DL — HIGH (ref 70–99)

## 2021-05-10 PROCEDURE — 78815 PET IMAGE W/CT SKULL-THIGH: CPT | Mod: 26,PI

## 2021-05-12 ENCOUNTER — APPOINTMENT (OUTPATIENT)
Dept: CARDIOTHORACIC SURGERY | Facility: CLINIC | Age: 76
End: 2021-05-12
Payer: MEDICARE

## 2021-05-12 VITALS
DIASTOLIC BLOOD PRESSURE: 67 MMHG | HEART RATE: 80 BPM | HEIGHT: 71 IN | WEIGHT: 220 LBS | BODY MASS INDEX: 30.8 KG/M2 | OXYGEN SATURATION: 96 % | TEMPERATURE: 97.6 F | SYSTOLIC BLOOD PRESSURE: 157 MMHG | RESPIRATION RATE: 18 BRPM

## 2021-05-12 PROCEDURE — 99072 ADDL SUPL MATRL&STAF TM PHE: CPT

## 2021-05-12 PROCEDURE — 99213 OFFICE O/P EST LOW 20 MIN: CPT

## 2021-05-12 NOTE — REASON FOR VISIT
[Follow-Up: _____] : a [unfilled] follow-up visit [FreeTextEntry1] : CAD/AS/Lung Nodule/Mediastinal Lymphadenopathy. \par CAD/AS/Lung Nodule/Mediastinal Lymphadenopathy. \par CAD/AS/Lung Nodule/Mediastinal Lymphadenopathy. \par CAD/AS/Lung Nodule/Mediastinal Lymphadenopathy\par

## 2021-05-12 NOTE — HISTORY OF PRESENT ILLNESS
[FreeTextEntry1] : Mr. Vasquez is a 74 y/o M, current smoker, PMH of DM, HTN, HLD and AS, presented for elective Cardiac Catheterization after having a positive stress test as an outpatient and was found to have multivessel disease. His initial symptoms included worsening SOB. He can walk less than 1 block before becoming SOB which has worsened. He denies any palpitations or chest pain/dizziness/syncope, NYHA Class III. He was consulted for AVR/CABG. Preop CT Chest showed a lobulated solid 2.7 cm RUL nodule, and a 5 mm ANIA nodule, and a 4.0 x 2.9 cm right paratracheal soft tissue lesion. S/P Bronch/EBUS 4/30/2021- Prelim Bronch/EBUS path showed carcinoma. Brain MRI negative for metastatic disease. Carotid Duplex showed greater than 80% stenosis of the right internal carotid artery. 60-79% moderate stenosis of the left internal carotid artery. He presents today after PET/CT and discussion in Thoracic Multimodality Tumor Board.\par \par Current Smoker- 1 PPD X 60 years- quit April 2021\par Just quit smoking while admitted to hospital\par Presents with his son, Primo \par \par His healthcare teams is as follows:\par PMD/Cardio: Dr. Collins\par

## 2021-05-12 NOTE — ASSESSMENT
[FreeTextEntry1] : Mr. Vasquez is a 74 y/o M, former smoker, PMH of DM, HTN, HLD and AS, being worked up for an AVR/CABG. Preop CT Chest showed a lobulated solid 2.7 cm RUL nodule, and a 5 mm ANIA nodule, and a 4.0 x 2.9 cm right paratracheal soft tissue lesion. Bronch/EBUS prelim path showed carcinoma. PET/CT on 5/10/2021 showed an FDG avid right upper lobe nodule, SUV max 23.7, Ipsilateral FDG avid lymphadenopathy up to 17.0 compatible with hetal metastases and borderline FDG avid right axillary lymph node, SUV max 4.1 and a posterior left upper lobe 5 mm solid nodule is not FDG avid.\par \par No changes in symptoms since PET/CT\par Presents with his son\par \par Plan:\par Pt discussed in Thoracic Multimodality Tumor Board last week\par No role for CABG at this time\par Will refer to Cardiologist Dr. Ramírez for potential stenting\par Refer to Oncologist Dr. Kirk for Chemo\par Refer to  for potential RT\par F/U if there is a role for surgery after adjuvant treatment determined by Oncology\par \par I, Carlyn Bond [facility], am acting as scribe for \par \par I personally performed the services described in the documentation, reviewed the documentation recorded by the scribe in my presence and it accurately and completely records my words and actions.\par \par patient and son informed of the diagnosis of cancer of the lung, stage IIIA\par will need chemo-radiation \par case was discussed at Thoracic Oncology Conference \par will arrange for patient to visit Dr. Kirk and Dr. Frey\par will also inform Dr. Ramírez and Dr. Collins\par \par -FMR\par

## 2021-05-12 NOTE — DATA REVIEWED
[FreeTextEntry1] : EXAM:  PETCT SKUL-THI ONC FDG INIT\par PROCEDURE DATE:  05/10/2021\par INTERPRETATION:  FDG PET CT STUDY   initial  treatment strategy\par REASON: TUMOR IMAGING - PET with concurrently acquired CT for attenuation correction and anatomic localization; skull base to mid - thigh .\par \par HISTORY: Chest CT on 4/29/2021 revealed a 4 cm right paratracheal soft tissue mass and lobulated solid 2.7 cm right upper lobe nodule suspicious for primary lung malignancy. 5 mm solid left upper lobe nodule. PET/CT ordered for staging purposes.\par \par TECHNIQUE: Approximately 45 minutes after the intravenous administration of 11.2 mCi 18-Fluorine FDG, whole body PET images were acquired from base of skull to mid - thigh.\par \par CT protocol used for this PET/CT study is designed for attenuation correction and anatomic localization of PET findings.  This  CT is not designed to replace state-of-the-art diagnostic CT scans for specific imaging protocols of different body parts.\par \par COMPARISON : None. Initial PET/CT. Correlation made with chest CT on 4/29/2021.\par \par FINDINGS:\par \par Head/Neck: No biologically active head/neck lesions.\par Normal uptake within the brain, pharyngeal lymphoid tissue, salivary glands and laryngeal musculature is noted.\par \par Thorax:\par FDG avid right upper lobe spiculated nodule, SUV max 23.7.\par Posterior left upper lobe 5 mm solid nodule is not FDG avid.\par \par FDG avid right lower paratracheal lymph node, SUV max 17.0.\par FDG avid right hilar lymph node, SUV max 15.5.\par \par Borderline FDG avid right axillary lymph node, SUV max 4.1 (correlate for site of Covid injection if recently vaccinated).\par \par Abdomen/Pelvis: Physiologic GI/  activity. No abnormal visceral or hetal tracer uptake is present.\par \par Musculoskeletal :  No suspicious hypermetabolic osseous lesions.\par \par Additional CT findings:\par Atrophic right kidney.\par Mild hepatic steatosis.\par \par IMPRESSION:\par FDG avid right upper lobe nodule, SUV max 23.7 compatible with biologic tumor activity. Ipsilateral FDG avid lymphadenopathy up to 17.0 compatible with hetal metastases.\par Borderline FDG avid right axillary lymph node, SUV max 4.1 (correlate for site of Covid injection if recently vaccinated).\par No other sites of pathologic FDG uptake.\par Posterior left upper lobe 5 mm solid nodule is not FDG avid.

## 2021-05-19 ENCOUNTER — APPOINTMENT (OUTPATIENT)
Dept: HEMATOLOGY ONCOLOGY | Facility: CLINIC | Age: 76
End: 2021-05-19
Payer: MEDICARE

## 2021-05-19 ENCOUNTER — LABORATORY RESULT (OUTPATIENT)
Age: 76
End: 2021-05-19

## 2021-05-19 VITALS
TEMPERATURE: 97.2 F | WEIGHT: 218 LBS | RESPIRATION RATE: 16 BRPM | SYSTOLIC BLOOD PRESSURE: 157 MMHG | HEART RATE: 68 BPM | BODY MASS INDEX: 30.52 KG/M2 | HEIGHT: 71 IN | DIASTOLIC BLOOD PRESSURE: 66 MMHG

## 2021-05-19 DIAGNOSIS — Z86.39 PERSONAL HISTORY OF OTHER ENDOCRINE, NUTRITIONAL AND METABOLIC DISEASE: ICD-10-CM

## 2021-05-19 DIAGNOSIS — Z86.79 PERSONAL HISTORY OF OTHER DISEASES OF THE CIRCULATORY SYSTEM: ICD-10-CM

## 2021-05-19 DIAGNOSIS — F17.200 NICOTINE DEPENDENCE, UNSPECIFIED, UNCOMPLICATED: ICD-10-CM

## 2021-05-19 DIAGNOSIS — I35.0 NONRHEUMATIC AORTIC (VALVE) STENOSIS: ICD-10-CM

## 2021-05-19 DIAGNOSIS — I25.10 ATHEROSCLEROTIC HEART DISEASE OF NATIVE CORONARY ARTERY W/OUT ANGINA PECTORIS: ICD-10-CM

## 2021-05-19 LAB
HCT VFR BLD CALC: 42.3 %
HGB BLD-MCNC: 13.7 G/DL
LDH SERPL-CCNC: 176 U/L
MCHC RBC-ENTMCNC: 29.2 PG
MCHC RBC-ENTMCNC: 32.4 G/DL
MCV RBC AUTO: 90.2 FL
PLATELET # BLD AUTO: 200 K/UL
PMV BLD: 9.7 FL
RBC # BLD: 4.69 M/UL
RBC # FLD: 13.2 %
WBC # FLD AUTO: 11.54 K/UL

## 2021-05-19 PROCEDURE — 99205 OFFICE O/P NEW HI 60 MIN: CPT

## 2021-05-19 RX ORDER — BENZONATATE 100 MG/1
100 CAPSULE ORAL 3 TIMES DAILY
Qty: 90 | Refills: 0 | Status: ACTIVE | COMMUNITY
Start: 2021-05-19 | End: 1900-01-01

## 2021-05-20 LAB
ALBUMIN SERPL ELPH-MCNC: 4.6 G/DL
ALP BLD-CCNC: 82 U/L
ALT SERPL-CCNC: 11 U/L
ANION GAP SERPL CALC-SCNC: 11 MMOL/L
AST SERPL-CCNC: 12 U/L
BILIRUB SERPL-MCNC: <0.2 MG/DL
BUN SERPL-MCNC: 24 MG/DL
CALCIUM SERPL-MCNC: 9.4 MG/DL
CHLORIDE SERPL-SCNC: 108 MMOL/L
CO2 SERPL-SCNC: 21 MMOL/L
CREAT SERPL-MCNC: 1.3 MG/DL
GLUCOSE SERPL-MCNC: 171 MG/DL
POTASSIUM SERPL-SCNC: 4.3 MMOL/L
PROT SERPL-MCNC: 6.8 G/DL
SODIUM SERPL-SCNC: 140 MMOL/L

## 2021-05-21 ENCOUNTER — APPOINTMENT (OUTPATIENT)
Dept: RADIATION ONCOLOGY | Facility: HOSPITAL | Age: 76
End: 2021-05-21
Payer: MEDICARE

## 2021-05-21 VITALS
RESPIRATION RATE: 16 BRPM | HEART RATE: 93 BPM | SYSTOLIC BLOOD PRESSURE: 149 MMHG | TEMPERATURE: 98.7 F | DIASTOLIC BLOOD PRESSURE: 63 MMHG | BODY MASS INDEX: 30.52 KG/M2 | WEIGHT: 218 LBS | HEIGHT: 71 IN | OXYGEN SATURATION: 98 %

## 2021-05-21 PROCEDURE — 99203 OFFICE O/P NEW LOW 30 MIN: CPT

## 2021-05-24 ENCOUNTER — NON-APPOINTMENT (OUTPATIENT)
Age: 76
End: 2021-05-24

## 2021-05-25 PROCEDURE — 77263 THER RADIOLOGY TX PLNG CPLX: CPT

## 2021-05-25 PROCEDURE — 77334 RADIATION TREATMENT AID(S): CPT | Mod: 26

## 2021-05-27 ENCOUNTER — APPOINTMENT (OUTPATIENT)
Dept: HEMATOLOGY ONCOLOGY | Facility: CLINIC | Age: 76
End: 2021-05-27

## 2021-05-27 ENCOUNTER — NON-APPOINTMENT (OUTPATIENT)
Age: 76
End: 2021-05-27

## 2021-05-27 RX ORDER — ONDANSETRON 8 MG/1
1 TABLET, FILM COATED ORAL
Qty: 0 | Refills: 3 | DISCHARGE
Start: 2021-05-27

## 2021-05-27 RX ORDER — PROCHLORPERAZINE MALEATE 5 MG
1 TABLET ORAL
Qty: 0 | Refills: 1 | DISCHARGE
Start: 2021-05-27

## 2021-05-27 RX ORDER — ONDANSETRON 8 MG/1
8 TABLET ORAL EVERY 8 HOURS
Qty: 45 | Refills: 3 | Status: ACTIVE | COMMUNITY
Start: 2021-05-27 | End: 1900-01-01

## 2021-05-29 LAB
CULTURE RESULTS: SIGNIFICANT CHANGE UP
SPECIMEN SOURCE: SIGNIFICANT CHANGE UP

## 2021-06-01 PROCEDURE — 77301 RADIOTHERAPY DOSE PLAN IMRT: CPT | Mod: 26

## 2021-06-01 PROCEDURE — 77300 RADIATION THERAPY DOSE PLAN: CPT | Mod: 26

## 2021-06-01 PROCEDURE — 77338 DESIGN MLC DEVICE FOR IMRT: CPT | Mod: 26

## 2021-06-03 PROBLEM — I25.10 CAD (CORONARY ARTERY DISEASE): Status: ACTIVE | Noted: 2021-05-04

## 2021-06-03 PROBLEM — I35.0 AORTIC STENOSIS: Status: ACTIVE | Noted: 2021-05-04

## 2021-06-03 NOTE — REASON FOR VISIT
[Initial Consultation] : an initial consultation [Family Member] : family member [FreeTextEntry2] : NSCLC, referred by Dr. Leonidas Farmer

## 2021-06-03 NOTE — RESULTS/DATA
[FreeTextEntry1] : \par  PET/CT FDG Skull to Thigh ONC initial Treatment Strategy             Final\par \par No Documents Attached\par \par EXAM:  PETCT SKUL-THI ONC FDG INIT\par \par \par PROCEDURE DATE:  05/10/2021\par \par INTERPRETATION:  FDG PET CT STUDY   initial  treatment strategy\par REASON: TUMOR IMAGING - PET with concurrently acquired CT for attenuation correction and anatomic localization; skull base to mid - thigh .\par \par CPT code 01093.\par \par Fasting blood glucose level:     126 mg/dl\par \par HISTORY: Chest CT on 4/29/2021 revealed a 4 cm right paratracheal soft tissue mass and lobulated solid 2.7 cm right upper lobe nodule suspicious for primary lung malignancy. 5 mm solid left upper lobe nodule. PET/CT ordered for staging purposes.\par \par TECHNIQUE: Approximately 45 minutes after the intravenous administration of 11.2 mCi 18-Fluorine FDG, whole body PET images were acquired from base of skull to mid - thigh.\par \par CT protocol used for this PET/CT study is designed for attenuation correction and anatomic localization of PET findings.  This  CT is not designed to replace state-of-the-art diagnostic CT scans for specific imaging protocols of different body parts.\par \par COMPARISON : None. Initial PET/CT. Correlation made with chest CT on 4/29/2021.\par \par FINDINGS:\par \par Head/Neck: No biologically active head/neck lesions.\par Normal uptake within the brain, pharyngeal lymphoid tissue, salivary glands and laryngeal musculature is noted.\par \par Thorax:\par FDG avid right upper lobe spiculated nodule, SUV max 23.7.\par Posterior left upper lobe 5 mm solid nodule is not FDG avid.\par \par FDG avid right lower paratracheal lymph node, SUV max 17.0.\par FDG avid right hilar lymph node, SUV max 15.5.\par \par Borderline FDG avid right axillary lymph node, SUV max 4.1 (correlate for site of Covid injection if recently vaccinated).\par \par Abdomen/Pelvis: Physiologic GI/  activity. No abnormal visceral or hetal tracer uptake is present.\par \par Musculoskeletal :  No suspicious hypermetabolic osseous lesions.\par \par Additional CT findings:\par Atrophic right kidney.\par Mild hepatic steatosis.\par \par IMPRESSION:\par \par FDG avid right upper lobe nodule, SUV max 23.7 compatible with biologic tumor activity. Ipsilateral FDG avid lymphadenopathy up to 17.0 compatible with hetal metastases.\par \par Borderline FDG avid right axillary lymph node, SUV max 4.1 (correlate for site of Covid injection if recently vaccinated).\par \par No other sites of pathologic FDG uptake.\par \par Posterior left upper lobe 5 mm solid nodule is not FDG avid.\par \par CARISA BILL MD; Attending Radiologist\par This document has been electronically signed. May 10 2021 10:02AM\par \par Ordered by: RAFAEL FIGUEROA       Collected/Examined: 10May2021 09:59AM       \par Verified by: RAFAEL FIGUEROA 12May2021 03:54PM       \par  Result Communication: No patient communication needed at this time;\par Stage: Final       \par  Performed at: [facility]       Resulted: 10May2021 09:46AM       Last Updated: 12May2021 03:54PM       Accession: [id number]

## 2021-06-03 NOTE — HISTORY OF PRESENT ILLNESS
[de-identified] : Mr. Vasquez is a 76 y/o M, current smoker, PMH of DM, HTN, HLD and AS, + smoker presenting for initial evaluation today for newly diagnosed NSCLC, adenocarcinoma. He presents with his son Primo.\par \par His history dates back to late April 2021 when he presented to the ED for shortness of breath after having a positive stress test as an outpatient. He reported that he could walk less than 1 block before becoming SOB which has progressively worsened. He denies any palpitations or chest pain/dizziness/syncope. He underwent cardiac catheterization and was found to have multivessel disease. He was seen by CT surgery while inpatient for  AVR/CABG. Preop CT Chest showed a lobulated solid 2.7 cm RUL nodule, and a 5 mm ANIA nodule, and a 4.0 x 2.9 cm right paratracheal soft tissue lesion. S/P Bronch/EBUS 4/30/2021- Prelim Bronch/EBUS path showed carcinoma. Brain MRI negative for metastatic disease. He was discharged with outpatient follow up with oncology and cardiology. \par \par He was subsequently discharged and underwent a PET/CT as outpatient which showed FDG avid right upper lobe nodule, SUV max 23.7 compatible with biologic tumor activity. Ipsilateral FDG avid lymphadenopathy up to 17.0 compatible with hetal metastases.\par \par Final pathology shows Non-small cell carcinoma, favor adenocarcinoma. Immunohistochemical stains are performed and show the tumor is\par positive for CK7, negative for CK20, TTF-1, napsin, CDX2, PAX8; findings are compatible with primary lung adenocarcinoma\par \par \par \par  [de-identified] : 5/19/2021: Today, he feels well. He denies any chest pain, coughing, wheezing, or abdominal pain. He is still actively smoking. He finds it very difficult to stop. He smokes about 1 PPD for at least 60 years.

## 2021-06-03 NOTE — ASSESSMENT
[FreeTextEntry1] : #) NSCLC, lung adenocarcinoma, PDL1 0%, NGS pending, clinical stage IIIA with N2 nodes \par -- CT chest on 4/29/21 Lobulated solid 2.7 cm right upper lobe nodule, suspicious for primary lung neoplasia with 5 mm solid nodule posterior left upper lobe. Approximate 4 x 2.9 cm right paratracheal soft tissue lesion \par --MRI brain 4/30/2021, negative for metastasis \par --PET/CT 5/10/2021 FDG avid right upper lobe nodule, SUV max 23.7 compatible with biologic tumor activity. Ipsilateral FDG avid lymphadenopathy up to 17.0 compatible with hetal metastases. Borderline FDG avid right axillary lymph node, SUV max 4.1. Posterior left upper lobe 5 mm solid nodule is not FDG avid.\par -- We discussed treatment options with the patient. At this time, he has clincal stage [id number] which is Stage IIIA disease. We discussed that treatment is curative in nature and consists of ChemoRT. \par --He is to see Dr. Frey for RT to the chest and plan would be to dose weekly Carbo/Taxol with RT. \par --Potential side effects were discussed with the patient including but not limited to nasuea, vomiting, hair loss, risk of infection and cytopenias \par \par #) Triple Vessel Disease \par -- He will need to see cardiology prior to treatment to decide on need for intervention for his known CAD, he may require stent rather than CABG \par --Case was discussed with Dr. Ramírez who advises to proceed with cancer directed therapy prior to stent consideration\par \par RTC in 3-4 weeks, with plan to start on concurrent chemo/radiation \par

## 2021-06-03 NOTE — CONSULT LETTER
[Dear  ___] : Dear  [unfilled], [Consult Letter:] : I had the pleasure of evaluating your patient, [unfilled]. [( Thank you for referring [unfilled] for consultation for _____ )] : Thank you for referring [unfilled] for consultation for [unfilled] [Please see my note below.] : Please see my note below. [Consult Closing:] : Thank you very much for allowing me to participate in the care of this patient.  If you have any questions, please do not hesitate to contact me. [Sincerely,] : Sincerely, [DrKarie  ___] : Dr. PRADO [DrKarie ___] : Dr. PRADO [FreeTextEntry3] : Janie Kirk MD

## 2021-06-10 PROCEDURE — 77427 RADIATION TX MANAGEMENT X5: CPT

## 2021-06-10 PROCEDURE — G6017: CPT

## 2021-06-11 ENCOUNTER — APPOINTMENT (OUTPATIENT)
Dept: INFUSION THERAPY | Facility: CLINIC | Age: 76
End: 2021-06-11

## 2021-06-11 ENCOUNTER — LABORATORY RESULT (OUTPATIENT)
Age: 76
End: 2021-06-11

## 2021-06-11 DIAGNOSIS — Z00.00 ENCOUNTER FOR GENERAL ADULT MEDICAL EXAMINATION W/OUT ABNORMAL FINDINGS: ICD-10-CM

## 2021-06-11 LAB
HCT VFR BLD CALC: 41.2 %
HGB BLD-MCNC: 13.8 G/DL
MCHC RBC-ENTMCNC: 29.8 PG
MCHC RBC-ENTMCNC: 33.5 G/DL
MCV RBC AUTO: 89 FL
PLATELET # BLD AUTO: 172 K/UL
PMV BLD: 10.7 FL
RBC # BLD: 4.63 M/UL
RBC # FLD: 13 %
WBC # FLD AUTO: 11.77 K/UL

## 2021-06-11 RX ORDER — DIPHENHYDRAMINE HCL 50 MG
50 CAPSULE ORAL ONCE
Refills: 0 | Status: COMPLETED | OUTPATIENT
Start: 2021-06-11 | End: 2021-06-11

## 2021-06-11 RX ORDER — PACLITAXEL 6 MG/ML
100 INJECTION, SOLUTION, CONCENTRATE INTRAVENOUS ONCE
Refills: 0 | Status: COMPLETED | OUTPATIENT
Start: 2021-06-11 | End: 2021-06-11

## 2021-06-11 RX ORDER — FOSAPREPITANT DIMEGLUMINE 150 MG/5ML
150 INJECTION, POWDER, LYOPHILIZED, FOR SOLUTION INTRAVENOUS ONCE
Refills: 0 | Status: COMPLETED | OUTPATIENT
Start: 2021-06-11 | End: 2021-06-11

## 2021-06-11 RX ORDER — CARBOPLATIN 50 MG
190 VIAL (EA) INTRAVENOUS ONCE
Refills: 0 | Status: COMPLETED | OUTPATIENT
Start: 2021-06-11 | End: 2021-06-11

## 2021-06-11 RX ORDER — FAMOTIDINE 10 MG/ML
20 INJECTION INTRAVENOUS ONCE
Refills: 0 | Status: COMPLETED | OUTPATIENT
Start: 2021-06-11 | End: 2021-06-11

## 2021-06-11 RX ORDER — DEXAMETHASONE 0.5 MG/5ML
12 ELIXIR ORAL ONCE
Refills: 0 | Status: COMPLETED | OUTPATIENT
Start: 2021-06-11 | End: 2021-06-11

## 2021-06-11 RX ADMIN — Medication 122 MILLIGRAM(S): at 12:31

## 2021-06-11 RX ADMIN — PACLITAXEL 266.67 MILLIGRAM(S): 6 INJECTION, SOLUTION, CONCENTRATE INTRAVENOUS at 13:06

## 2021-06-11 RX ADMIN — FOSAPREPITANT DIMEGLUMINE 450 MILLIGRAM(S): 150 INJECTION, POWDER, LYOPHILIZED, FOR SOLUTION INTRAVENOUS at 12:31

## 2021-06-11 RX ADMIN — Medication 102 MILLIGRAM(S): at 12:31

## 2021-06-11 RX ADMIN — Medication 269 MILLIGRAM(S): at 13:05

## 2021-06-11 RX ADMIN — FAMOTIDINE 104 MILLIGRAM(S): 10 INJECTION INTRAVENOUS at 12:31

## 2021-06-14 PROCEDURE — G6002: CPT | Mod: 26

## 2021-06-15 ENCOUNTER — NON-APPOINTMENT (OUTPATIENT)
Age: 76
End: 2021-06-15

## 2021-06-15 VITALS
SYSTOLIC BLOOD PRESSURE: 150 MMHG | WEIGHT: 216 LBS | RESPIRATION RATE: 16 BRPM | OXYGEN SATURATION: 99 % | DIASTOLIC BLOOD PRESSURE: 68 MMHG | BODY MASS INDEX: 30.13 KG/M2 | TEMPERATURE: 97.1 F | HEART RATE: 88 BPM

## 2021-06-15 PROCEDURE — G6017: CPT

## 2021-06-15 NOTE — PHYSICAL EXAM
8 [Normal] : oriented to person, place and time, the affect was normal, the mood was normal and not anxious [de-identified] : Wheezes on the left [de-identified] : systolic murmur

## 2021-06-15 NOTE — DISEASE MANAGEMENT
[Clinical] : TNM Stage: c [IIIA] : IIIA [TTNM] : 2c [NTNM] : 2 [MTNM] : 0 [de-identified] : 800cGy [de-identified] : 6000cGy [de-identified] : Right lung + med

## 2021-06-15 NOTE — REVIEW OF SYSTEMS
[Cough: Grade 1 - Mild symptoms; nonprescription intervention indicated] : Cough: Grade 1 - Mild symptoms; nonprescription intervention indicated

## 2021-06-15 NOTE — HISTORY OF PRESENT ILLNESS
[FreeTextEntry1] : 6/15/2021 OTV 800cGy/6000cGy to the right lunth treatment today. Denies change in SOB or cough. Eating and drinking well. No complaints at this time. Pulse ox 99% RA. Will bring medication list tomorrow. Reviewed skin care, does not wish to use moisturizer at this time. Continues to smoke 16 cigarettes per day, is trying to decrease gradually.

## 2021-06-16 ENCOUNTER — NON-APPOINTMENT (OUTPATIENT)
Age: 76
End: 2021-06-16

## 2021-06-16 LAB
ALBUMIN SERPL ELPH-MCNC: 4.5 G/DL
ALP BLD-CCNC: 82 U/L
ALT SERPL-CCNC: 13 U/L
ANION GAP SERPL CALC-SCNC: 9 MMOL/L
AST SERPL-CCNC: 12 U/L
BILIRUB SERPL-MCNC: 0.3 MG/DL
BUN SERPL-MCNC: 24 MG/DL
CALCIUM SERPL-MCNC: 9.1 MG/DL
CHLORIDE SERPL-SCNC: 109 MMOL/L
CO2 SERPL-SCNC: 21 MMOL/L
CREAT SERPL-MCNC: 1.2 MG/DL
GLUCOSE SERPL-MCNC: 181 MG/DL
POTASSIUM SERPL-SCNC: 5.3 MMOL/L
PROT SERPL-MCNC: 6.7 G/DL
SODIUM SERPL-SCNC: 139 MMOL/L

## 2021-06-16 PROCEDURE — 77014: CPT | Mod: 26

## 2021-06-17 PROCEDURE — 77427 RADIATION TX MANAGEMENT X5: CPT

## 2021-06-17 PROCEDURE — G6017: CPT

## 2021-06-18 ENCOUNTER — LABORATORY RESULT (OUTPATIENT)
Age: 76
End: 2021-06-18

## 2021-06-18 ENCOUNTER — APPOINTMENT (OUTPATIENT)
Dept: INFUSION THERAPY | Facility: CLINIC | Age: 76
End: 2021-06-18

## 2021-06-18 LAB
HCT VFR BLD CALC: 39.8 %
HGB BLD-MCNC: 13.3 G/DL
MCHC RBC-ENTMCNC: 29.7 PG
MCHC RBC-ENTMCNC: 33.4 G/DL
MCV RBC AUTO: 88.8 FL
PLATELET # BLD AUTO: 142 K/UL
PMV BLD: 11.6 FL
RBC # BLD: 4.48 M/UL
RBC # FLD: 12.8 %
WBC # FLD AUTO: 9.39 K/UL

## 2021-06-18 RX ORDER — DIPHENHYDRAMINE HCL 50 MG
50 CAPSULE ORAL ONCE
Refills: 0 | Status: COMPLETED | OUTPATIENT
Start: 2021-06-18 | End: 2021-06-18

## 2021-06-18 RX ORDER — PACLITAXEL 6 MG/ML
100 INJECTION, SOLUTION, CONCENTRATE INTRAVENOUS ONCE
Refills: 0 | Status: COMPLETED | OUTPATIENT
Start: 2021-06-18 | End: 2021-06-18

## 2021-06-18 RX ORDER — FAMOTIDINE 10 MG/ML
20 INJECTION INTRAVENOUS ONCE
Refills: 0 | Status: COMPLETED | OUTPATIENT
Start: 2021-06-18 | End: 2021-06-18

## 2021-06-18 RX ORDER — DEXAMETHASONE 0.5 MG/5ML
12 ELIXIR ORAL ONCE
Refills: 0 | Status: COMPLETED | OUTPATIENT
Start: 2021-06-18 | End: 2021-06-18

## 2021-06-18 RX ORDER — CARBOPLATIN 50 MG
190 VIAL (EA) INTRAVENOUS ONCE
Refills: 0 | Status: COMPLETED | OUTPATIENT
Start: 2021-06-18 | End: 2021-06-18

## 2021-06-18 RX ORDER — FOSAPREPITANT DIMEGLUMINE 150 MG/5ML
150 INJECTION, POWDER, LYOPHILIZED, FOR SOLUTION INTRAVENOUS ONCE
Refills: 0 | Status: COMPLETED | OUTPATIENT
Start: 2021-06-18 | End: 2021-06-18

## 2021-06-18 RX ADMIN — PACLITAXEL 266.67 MILLIGRAM(S): 6 INJECTION, SOLUTION, CONCENTRATE INTRAVENOUS at 12:31

## 2021-06-18 RX ADMIN — Medication 102 MILLIGRAM(S): at 13:04

## 2021-06-18 RX ADMIN — FAMOTIDINE 104 MILLIGRAM(S): 10 INJECTION INTRAVENOUS at 12:44

## 2021-06-18 RX ADMIN — FAMOTIDINE 20 MILLIGRAM(S): 10 INJECTION INTRAVENOUS at 13:04

## 2021-06-18 RX ADMIN — Medication 50 MILLIGRAM(S): at 13:24

## 2021-06-18 RX ADMIN — Medication 269 MILLIGRAM(S): at 14:33

## 2021-06-18 RX ADMIN — Medication 12 MILLIGRAM(S): at 12:44

## 2021-06-18 RX ADMIN — Medication 122 MILLIGRAM(S): at 12:24

## 2021-06-18 RX ADMIN — FOSAPREPITANT DIMEGLUMINE 450 MILLIGRAM(S): 150 INJECTION, POWDER, LYOPHILIZED, FOR SOLUTION INTRAVENOUS at 13:24

## 2021-06-19 LAB
CULTURE RESULTS: SIGNIFICANT CHANGE UP
SPECIMEN SOURCE: SIGNIFICANT CHANGE UP

## 2021-06-21 ENCOUNTER — NON-APPOINTMENT (OUTPATIENT)
Age: 76
End: 2021-06-21

## 2021-06-21 PROCEDURE — G6002: CPT | Mod: 26

## 2021-06-22 ENCOUNTER — NON-APPOINTMENT (OUTPATIENT)
Age: 76
End: 2021-06-22

## 2021-06-22 VITALS
BODY MASS INDEX: 30.02 KG/M2 | OXYGEN SATURATION: 99 % | TEMPERATURE: 96.6 F | HEART RATE: 91 BPM | DIASTOLIC BLOOD PRESSURE: 62 MMHG | WEIGHT: 215.25 LBS | SYSTOLIC BLOOD PRESSURE: 140 MMHG | RESPIRATION RATE: 16 BRPM

## 2021-06-22 LAB
ALBUMIN SERPL ELPH-MCNC: 4.2 G/DL
ALP BLD-CCNC: 69 U/L
ALT SERPL-CCNC: 15 U/L
ANION GAP SERPL CALC-SCNC: 10 MMOL/L
AST SERPL-CCNC: 22 U/L
BILIRUB SERPL-MCNC: 0.4 MG/DL
BUN SERPL-MCNC: 31 MG/DL
CALCIUM SERPL-MCNC: 8.8 MG/DL
CHLORIDE SERPL-SCNC: 102 MMOL/L
CO2 SERPL-SCNC: 22 MMOL/L
CREAT SERPL-MCNC: 1.2 MG/DL
GLUCOSE SERPL-MCNC: 154 MG/DL
POTASSIUM SERPL-SCNC: 6.7 MMOL/L
PROT SERPL-MCNC: 6.5 G/DL
SODIUM SERPL-SCNC: 134 MMOL/L

## 2021-06-22 PROCEDURE — G6002: CPT | Mod: 26

## 2021-06-22 NOTE — HISTORY OF PRESENT ILLNESS
[FreeTextEntry1] : 2021 OTV 1800cGy/6000cGy to the right lung: Pt denies changes in SOB or cough. Denies dysphagia or nausea. Continues to smoke 15- 16 cigarettes per day. Not moisturizing at this time.  c/o congestion from mucous in the early morning. \par \par 6/15/2021 OTV 800cGy/6000cGy to the right lunth treatment today. Denies change in SOB or cough. Eating and drinking well. No complaints at this time. Pulse ox 99% RA. Will bring medication list tomorrow. Reviewed skin care, does not wish to use moisturizer at this time. Continues to smoke 16 cigarettes per day, is trying to decrease gradually.

## 2021-06-22 NOTE — PHYSICAL EXAM
[Normal] : oriented to person, place and time, the affect was normal, the mood was normal and not anxious [de-identified] : Wheezes on the left [de-identified] : systolic murmur

## 2021-06-22 NOTE — DISEASE MANAGEMENT
[Clinical] : TNM Stage: c [IIIA] : IIIA [TTNM] : 2c [NTNM] : 2 [MTNM] : 0 [de-identified] : 1800cGy [de-identified] : 6000cGy [de-identified] : Right lung + med

## 2021-06-23 ENCOUNTER — LABORATORY RESULT (OUTPATIENT)
Age: 76
End: 2021-06-23

## 2021-06-23 ENCOUNTER — APPOINTMENT (OUTPATIENT)
Dept: HEMATOLOGY ONCOLOGY | Facility: CLINIC | Age: 76
End: 2021-06-23

## 2021-06-23 PROCEDURE — 77014: CPT | Mod: 26

## 2021-06-24 ENCOUNTER — APPOINTMENT (OUTPATIENT)
Dept: HEMATOLOGY ONCOLOGY | Facility: CLINIC | Age: 76
End: 2021-06-24
Payer: MEDICARE

## 2021-06-24 ENCOUNTER — APPOINTMENT (OUTPATIENT)
Dept: INFUSION THERAPY | Facility: CLINIC | Age: 76
End: 2021-06-24
Payer: MEDICARE

## 2021-06-24 VITALS
HEART RATE: 84 BPM | RESPIRATION RATE: 14 BRPM | HEIGHT: 71 IN | TEMPERATURE: 97.8 F | SYSTOLIC BLOOD PRESSURE: 112 MMHG | WEIGHT: 210 LBS | DIASTOLIC BLOOD PRESSURE: 60 MMHG | BODY MASS INDEX: 29.4 KG/M2

## 2021-06-24 DIAGNOSIS — Z86.79 PERSONAL HISTORY OF OTHER DISEASES OF THE CIRCULATORY SYSTEM: ICD-10-CM

## 2021-06-24 LAB
ALBUMIN SERPL ELPH-MCNC: 4.1 G/DL
ALP BLD-CCNC: 70 U/L
ALT SERPL-CCNC: 16 U/L
ANION GAP SERPL CALC-SCNC: 10 MMOL/L
AST SERPL-CCNC: 12 U/L
BILIRUB SERPL-MCNC: 0.3 MG/DL
BUN SERPL-MCNC: 34 MG/DL
CALCIUM SERPL-MCNC: 9.2 MG/DL
CHLORIDE SERPL-SCNC: 102 MMOL/L
CO2 SERPL-SCNC: 23 MMOL/L
CREAT SERPL-MCNC: 1.3 MG/DL
GLUCOSE SERPL-MCNC: 171 MG/DL
HCT VFR BLD CALC: 39.5 %
HGB BLD-MCNC: 13 G/DL
MCHC RBC-ENTMCNC: 29.4 PG
MCHC RBC-ENTMCNC: 32.9 G/DL
MCV RBC AUTO: 89.4 FL
PLATELET # BLD AUTO: 167 K/UL
PMV BLD: 10.5 FL
POTASSIUM SERPL-SCNC: 4.8 MMOL/L
PROT SERPL-MCNC: 6 G/DL
RBC # BLD: 4.42 M/UL
RBC # FLD: 13 %
SODIUM SERPL-SCNC: 135 MMOL/L
WBC # FLD AUTO: 8.97 K/UL

## 2021-06-24 PROCEDURE — 77427 RADIATION TX MANAGEMENT X5: CPT

## 2021-06-24 PROCEDURE — G6002: CPT | Mod: 26

## 2021-06-24 PROCEDURE — 99214 OFFICE O/P EST MOD 30 MIN: CPT

## 2021-06-24 RX ORDER — DEXAMETHASONE 0.5 MG/5ML
12 ELIXIR ORAL ONCE
Refills: 0 | Status: COMPLETED | OUTPATIENT
Start: 2021-06-24 | End: 2021-06-24

## 2021-06-24 RX ORDER — PACLITAXEL 6 MG/ML
100 INJECTION, SOLUTION, CONCENTRATE INTRAVENOUS ONCE
Refills: 0 | Status: COMPLETED | OUTPATIENT
Start: 2021-06-24 | End: 2021-06-24

## 2021-06-24 RX ORDER — FOSAPREPITANT DIMEGLUMINE 150 MG/5ML
150 INJECTION, POWDER, LYOPHILIZED, FOR SOLUTION INTRAVENOUS ONCE
Refills: 0 | Status: COMPLETED | OUTPATIENT
Start: 2021-06-24 | End: 2021-06-24

## 2021-06-24 RX ORDER — DIPHENHYDRAMINE HCL 50 MG
50 CAPSULE ORAL ONCE
Refills: 0 | Status: COMPLETED | OUTPATIENT
Start: 2021-06-24 | End: 2021-06-24

## 2021-06-24 RX ORDER — CARBOPLATIN 50 MG
190 VIAL (EA) INTRAVENOUS ONCE
Refills: 0 | Status: COMPLETED | OUTPATIENT
Start: 2021-06-24 | End: 2021-06-24

## 2021-06-24 RX ORDER — FAMOTIDINE 10 MG/ML
20 INJECTION INTRAVENOUS ONCE
Refills: 0 | Status: COMPLETED | OUTPATIENT
Start: 2021-06-24 | End: 2021-06-24

## 2021-06-24 RX ADMIN — PACLITAXEL 266.67 MILLIGRAM(S): 6 INJECTION, SOLUTION, CONCENTRATE INTRAVENOUS at 11:50

## 2021-06-24 RX ADMIN — FOSAPREPITANT DIMEGLUMINE 450 MILLIGRAM(S): 150 INJECTION, POWDER, LYOPHILIZED, FOR SOLUTION INTRAVENOUS at 12:10

## 2021-06-24 RX ADMIN — Medication 122 MILLIGRAM(S): at 11:25

## 2021-06-24 RX ADMIN — Medication 102 MILLIGRAM(S): at 11:55

## 2021-06-24 RX ADMIN — FAMOTIDINE 104 MILLIGRAM(S): 10 INJECTION INTRAVENOUS at 11:40

## 2021-06-24 RX ADMIN — Medication 269 MILLIGRAM(S): at 11:51

## 2021-06-24 NOTE — RESULTS/DATA
[FreeTextEntry1] : \par  PET/CT FDG Skull to Thigh ONC initial Treatment Strategy             Final\par \par No Documents Attached\par \par EXAM:  PETCT SKUL-THI ONC FDG INIT\par \par \par PROCEDURE DATE:  05/10/2021\par \par INTERPRETATION:  FDG PET CT STUDY   initial  treatment strategy\par REASON: TUMOR IMAGING - PET with concurrently acquired CT for attenuation correction and anatomic localization; skull base to mid - thigh .\par \par CPT code 86327.\par \par Fasting blood glucose level:     126 mg/dl\par \par HISTORY: Chest CT on 4/29/2021 revealed a 4 cm right paratracheal soft tissue mass and lobulated solid 2.7 cm right upper lobe nodule suspicious for primary lung malignancy. 5 mm solid left upper lobe nodule. PET/CT ordered for staging purposes.\par \par TECHNIQUE: Approximately 45 minutes after the intravenous administration of 11.2 mCi 18-Fluorine FDG, whole body PET images were acquired from base of skull to mid - thigh.\par \par CT protocol used for this PET/CT study is designed for attenuation correction and anatomic localization of PET findings.  This  CT is not designed to replace state-of-the-art diagnostic CT scans for specific imaging protocols of different body parts.\par \par COMPARISON : None. Initial PET/CT. Correlation made with chest CT on 4/29/2021.\par \par FINDINGS:\par \par Head/Neck: No biologically active head/neck lesions.\par Normal uptake within the brain, pharyngeal lymphoid tissue, salivary glands and laryngeal musculature is noted.\par \par Thorax:\par FDG avid right upper lobe spiculated nodule, SUV max 23.7.\par Posterior left upper lobe 5 mm solid nodule is not FDG avid.\par \par FDG avid right lower paratracheal lymph node, SUV max 17.0.\par FDG avid right hilar lymph node, SUV max 15.5.\par \par Borderline FDG avid right axillary lymph node, SUV max 4.1 (correlate for site of Covid injection if recently vaccinated).\par \par Abdomen/Pelvis: Physiologic GI/  activity. No abnormal visceral or hetal tracer uptake is present.\par \par Musculoskeletal :  No suspicious hypermetabolic osseous lesions.\par \par Additional CT findings:\par Atrophic right kidney.\par Mild hepatic steatosis.\par \par IMPRESSION:\par \par FDG avid right upper lobe nodule, SUV max 23.7 compatible with biologic tumor activity. Ipsilateral FDG avid lymphadenopathy up to 17.0 compatible with hetal metastases.\par \par Borderline FDG avid right axillary lymph node, SUV max 4.1 (correlate for site of Covid injection if recently vaccinated).\par \par No other sites of pathologic FDG uptake.\par \par Posterior left upper lobe 5 mm solid nodule is not FDG avid.\par \par CARISA BILL MD; Attending Radiologist\par This document has been electronically signed. May 10 2021 10:02AM\par \par Ordered by: RAFAEL FIGUEROA       Collected/Examined: 10May2021 09:59AM       \par Verified by: RAFAEL FIGUEROA 12May2021 03:54PM       \par  Result Communication: No patient communication needed at this time;\par Stage: Final       \par  Performed at: [facility]       Resulted: 10May2021 09:46AM       Last Updated: 12May2021 03:54PM       Accession: [id number]

## 2021-06-24 NOTE — ASSESSMENT
[FreeTextEntry1] : #) NSCLC, lung adenocarcinoma, PDL1 0%, NGS pending, clinical stage IIIA with N2 nodes \par -- CT chest on 4/29/21 Lobulated solid 2.7 cm right upper lobe nodule, suspicious for primary lung neoplasia with 5 mm solid nodule posterior left upper lobe. Approximate 4 x 2.9 cm right paratracheal soft tissue lesion \par --MRI brain 4/30/2021, negative for metastasis \par --PET/CT 5/10/2021 FDG avid right upper lobe nodule, SUV max 23.7 compatible with biologic tumor activity. Ipsilateral FDG avid lymphadenopathy up to 17.0 compatible with hetal metastases. Borderline FDG avid right axillary lymph node, SUV max 4.1. Posterior left upper lobe 5 mm solid nodule is not FDG avid.\par -- We discussed treatment options with the patient. At this time, he has clincal stage [id number] which is Stage IIIA disease. We discussed that treatment is curative in nature and consists of ChemoRT. \par --He is to see Dr. Frey for RT to the chest and plan would be to dose weekly Carbo/Taxol with RT. \par --Potential side effects were discussed with the patient including but not limited to nausea, vomiting, hair loss, risk of infection and cytopenias .\par On 6/24/21:\par patient will proceed with Cycle #3 of carboplatin and Taxol.\par He still have 3 more weeks of RT.\par \par #) Triple Vessel Disease \par -- He will need to see cardiology prior to treatment to decide on need for intervention for his known CAD, he may require stent rather than CABG \par --Case was discussed with Dr. Ramírez who advises to proceed with cancer directed therapy prior to stent consideration\par \par RTC in 3 weeks \par Patient seen and examined by Dr. steele who agreed for the above plan of care.

## 2021-06-24 NOTE — HISTORY OF PRESENT ILLNESS
[de-identified] : Mr. Vasquez is a 74 y/o M, current smoker, PMH of DM, HTN, HLD and AS, + smoker presenting for initial evaluation today for newly diagnosed NSCLC, adenocarcinoma. He presents with his son Primo.\par \par His history dates back to late April 2021 when he presented to the ED for shortness of breath after having a positive stress test as an outpatient. He reported that he could walk less than 1 block before becoming SOB which has progressively worsened. He denies any palpitations or chest pain/dizziness/syncope. He underwent cardiac catheterization and was found to have multivessel disease. He was seen by CT surgery while inpatient for  AVR/CABG. Preop CT Chest showed a lobulated solid 2.7 cm RUL nodule, and a 5 mm ANIA nodule, and a 4.0 x 2.9 cm right paratracheal soft tissue lesion. S/P Bronch/EBUS 4/30/2021- Prelim Bronch/EBUS path showed carcinoma. Brain MRI negative for metastatic disease. He was discharged with outpatient follow up with oncology and cardiology. \par \par He was subsequently discharged and underwent a PET/CT as outpatient which showed FDG avid right upper lobe nodule, SUV max 23.7 compatible with biologic tumor activity. Ipsilateral FDG avid lymphadenopathy up to 17.0 compatible with hetal metastases.\par \par Final pathology shows Non-small cell carcinoma, favor adenocarcinoma. Immunohistochemical stains are performed and show the tumor is\par positive for CK7, negative for CK20, TTF-1, napsin, CDX2, PAX8; findings are compatible with primary lung adenocarcinoma\par \par \par \par  [de-identified] : 5/19/2021: Today, he feels well. He denies any chest pain, coughing, wheezing, or abdominal pain. He is still actively smoking. He finds it very difficult to stop. He smokes about 1 PPD for at least 60 years. \par \par 6/24/21: Kyung is here for follow up visit for NSCLC, adenocarcinoma. He completed 15 RT treatments and he is due for the 3rd cycle of weekly Carbo/taxol. He reports feeling well. He denies any chest pain, coughing, wheezing,  abdominal pain, dysphagia, tolerating diet well. \par CBC , CMP reviewed with acceptable results. \par He is still actively smoking. He finds it very difficult to stop. He smokes about 1 PPD for at least 60 years.\par Cessation was advised.

## 2021-06-24 NOTE — RESULTS/DATA
[FreeTextEntry1] : \par  PET/CT FDG Skull to Thigh ONC initial Treatment Strategy             Final\par \par No Documents Attached\par \par EXAM:  PETCT SKUL-THI ONC FDG INIT\par \par \par PROCEDURE DATE:  05/10/2021\par \par INTERPRETATION:  FDG PET CT STUDY   initial  treatment strategy\par REASON: TUMOR IMAGING - PET with concurrently acquired CT for attenuation correction and anatomic localization; skull base to mid - thigh .\par \par CPT code 69092.\par \par Fasting blood glucose level:     126 mg/dl\par \par HISTORY: Chest CT on 4/29/2021 revealed a 4 cm right paratracheal soft tissue mass and lobulated solid 2.7 cm right upper lobe nodule suspicious for primary lung malignancy. 5 mm solid left upper lobe nodule. PET/CT ordered for staging purposes.\par \par TECHNIQUE: Approximately 45 minutes after the intravenous administration of 11.2 mCi 18-Fluorine FDG, whole body PET images were acquired from base of skull to mid - thigh.\par \par CT protocol used for this PET/CT study is designed for attenuation correction and anatomic localization of PET findings.  This  CT is not designed to replace state-of-the-art diagnostic CT scans for specific imaging protocols of different body parts.\par \par COMPARISON : None. Initial PET/CT. Correlation made with chest CT on 4/29/2021.\par \par FINDINGS:\par \par Head/Neck: No biologically active head/neck lesions.\par Normal uptake within the brain, pharyngeal lymphoid tissue, salivary glands and laryngeal musculature is noted.\par \par Thorax:\par FDG avid right upper lobe spiculated nodule, SUV max 23.7.\par Posterior left upper lobe 5 mm solid nodule is not FDG avid.\par \par FDG avid right lower paratracheal lymph node, SUV max 17.0.\par FDG avid right hilar lymph node, SUV max 15.5.\par \par Borderline FDG avid right axillary lymph node, SUV max 4.1 (correlate for site of Covid injection if recently vaccinated).\par \par Abdomen/Pelvis: Physiologic GI/  activity. No abnormal visceral or hetal tracer uptake is present.\par \par Musculoskeletal :  No suspicious hypermetabolic osseous lesions.\par \par Additional CT findings:\par Atrophic right kidney.\par Mild hepatic steatosis.\par \par IMPRESSION:\par \par FDG avid right upper lobe nodule, SUV max 23.7 compatible with biologic tumor activity. Ipsilateral FDG avid lymphadenopathy up to 17.0 compatible with hetal metastases.\par \par Borderline FDG avid right axillary lymph node, SUV max 4.1 (correlate for site of Covid injection if recently vaccinated).\par \par No other sites of pathologic FDG uptake.\par \par Posterior left upper lobe 5 mm solid nodule is not FDG avid.\par \par CARISA BILL MD; Attending Radiologist\par This document has been electronically signed. May 10 2021 10:02AM\par \par Ordered by: RAFAEL FIGUEROA       Collected/Examined: 10May2021 09:59AM       \par Verified by: RAFAEL FIGUEROA 12May2021 03:54PM       \par  Result Communication: No patient communication needed at this time;\par Stage: Final       \par  Performed at: [facility]       Resulted: 10May2021 09:46AM       Last Updated: 12May2021 03:54PM       Accession: [id number]

## 2021-06-24 NOTE — END OF VISIT
[FreeTextEntry3] : Patient was seen seen examined with NP Jordy, agree with above plan of care.\par

## 2021-06-24 NOTE — REASON FOR VISIT
[Follow-Up Visit] : a follow-up [Family Member] : family member [FreeTextEntry2] : NSCLC, referred by Dr. Leonidas Farmer

## 2021-06-24 NOTE — ASSESSMENT
[FreeTextEntry1] : NSCLC, lung adenocarcinoma, PDL1 0%, NGS pending, clinical stage IIIA with N2 nodes \par -- CT chest on 4/29/21 Lobulated solid 2.7 cm right upper lobe nodule, suspicious for primary lung neoplasia with 5 mm solid nodule posterior left upper lobe. Approximate 4 x 2.9 cm right paratracheal soft tissue lesion \par --MRI brain 4/30/2021, negative for metastasis \par --PET/CT 5/10/2021 FDG avid right upper lobe nodule, SUV max 23.7 compatible with biologic tumor activity. Ipsilateral FDG avid lymphadenopathy up to 17.0 compatible with hetal metastases. Borderline FDG avid right axillary lymph node, SUV max 4.1. Posterior left upper lobe 5 mm solid nodule is not FDG avid.\par -- We discussed treatment options with the patient. At this time, he has clincal stage [id number] which is Stage IIIA disease. We discussed that treatment is curative in nature and consists of ChemoRT. \par --He is to see Dr. Frey for RT to the chest and plan would be to dose weekly Carbo/Taxol with RT. \par --Potential side effects were discussed with the patient including but not limited to nausea, vomiting, hair loss, risk of infection and cytopenias \par --On 6/24/21 patient will proceed with week #3 of Carboplatin and Taxol, no significant side effects reported \par --He still have 3 more weeks of RT.\par \par Triple Vessel Disease \par -- He will need to see cardiology prior to treatment to decide on need for intervention for his known CAD, he may require stent rather than CABG \par --Case was discussed with Dr. Ramírez who advises to proceed with cancer directed therapy prior to stent consideration\par \par RTC in 3 weeks \par Patient seen and examined by Dr. Kirk who agreed for the above plan of care.

## 2021-06-24 NOTE — HISTORY OF PRESENT ILLNESS
[de-identified] : Mr. Vasquez is a 74 y/o M, current smoker, PMH of DM, HTN, HLD and AS, + smoker presenting for initial evaluation today for newly diagnosed NSCLC, adenocarcinoma. He presents with his son Primo.\par \par His history dates back to late April 2021 when he presented to the ED for shortness of breath after having a positive stress test as an outpatient. He reported that he could walk less than 1 block before becoming SOB which has progressively worsened. He denies any palpitations or chest pain/dizziness/syncope. He underwent cardiac catheterization and was found to have multivessel disease. He was seen by CT surgery while inpatient for  AVR/CABG. Preop CT Chest showed a lobulated solid 2.7 cm RUL nodule, and a 5 mm ANIA nodule, and a 4.0 x 2.9 cm right paratracheal soft tissue lesion. S/P Bronch/EBUS 4/30/2021- Prelim Bronch/EBUS path showed carcinoma. Brain MRI negative for metastatic disease. He was discharged with outpatient follow up with oncology and cardiology. \par \par He was subsequently discharged and underwent a PET/CT as outpatient which showed FDG avid right upper lobe nodule, SUV max 23.7 compatible with biologic tumor activity. Ipsilateral FDG avid lymphadenopathy up to 17.0 compatible with hetal metastases.\par \par Final pathology shows Non-small cell carcinoma, favor adenocarcinoma. Immunohistochemical stains are performed and show the tumor is\par positive for CK7, negative for CK20, TTF-1, napsin, CDX2, PAX8; findings are compatible with primary lung adenocarcinoma\par \par \par \par  [de-identified] : 5/19/2021: Today, he feels well. He denies any chest pain, coughing, wheezing, or abdominal pain. He is still actively smoking. He finds it very difficult to stop. He smokes about 1 PPD for at least 60 years. \par 6/24/21:\par  Mr. CASTANEDA came for a follow up visit for  NSCLC, lung adenocarcinoma. He completed 15 RT treatment and he is due for the 3rd cycle of Chemo. He reports  feels well. He denies any chest pain, coughing, wheezing,  abdominal pain.  dysphagia. Patient tolerating diet well. \par CBC , CMP reviewed with acceptable results. \par  He is still actively smoking. He finds it very difficult to stop. He smokes about 1 PPD for at least 60 years.

## 2021-06-25 LAB
APPEARANCE: CLEAR
BILIRUBIN URINE: NEGATIVE
BLOOD URINE: NEGATIVE
COLOR: YELLOW
GLUCOSE QUALITATIVE U: NEGATIVE
KETONES URINE: NEGATIVE
LEUKOCYTE ESTERASE URINE: NEGATIVE
NITRITE URINE: NEGATIVE
PH URINE: 6
PROTEIN URINE: NORMAL
SPECIFIC GRAVITY URINE: 1.03
UROBILINOGEN URINE: NORMAL

## 2021-06-28 PROCEDURE — G6002: CPT | Mod: 26

## 2021-06-29 ENCOUNTER — NON-APPOINTMENT (OUTPATIENT)
Age: 76
End: 2021-06-29

## 2021-06-29 VITALS
HEART RATE: 110 BPM | SYSTOLIC BLOOD PRESSURE: 129 MMHG | WEIGHT: 209 LBS | DIASTOLIC BLOOD PRESSURE: 60 MMHG | OXYGEN SATURATION: 99 % | RESPIRATION RATE: 16 BRPM | TEMPERATURE: 97.4 F | BODY MASS INDEX: 29.15 KG/M2

## 2021-06-29 PROCEDURE — G6002: CPT | Mod: 26

## 2021-06-29 NOTE — DISEASE MANAGEMENT
[Clinical] : TNM Stage: c [IIIA] : IIIA [NTNM] : 2 [TTNM] : 2c [MTNM] : 0 [de-identified] : 2800cGy [de-identified] : 6000cGy [de-identified] : Right lung + med

## 2021-06-29 NOTE — PHYSICAL EXAM
[Normal] : oriented to person, place and time, the affect was normal, the mood was normal and not anxious [de-identified] : Wheezes on the left [de-identified] : systolic murmur

## 2021-06-29 NOTE — HISTORY OF PRESENT ILLNESS
[FreeTextEntry1] : 21 OTV 2800cGY/6000cGy to the right lung: C/o slight SOB. Has dry cough at night and early morning. Denies uses any moisturizer. Attempting to cut down cigarettes. Denies any N/V/D. \par \par 2021 OTV 1800cGy/6000cGy to the right lung: Pt denies changes in SOB or cough. Denies dysphagia or nausea. Continues to smoke 15- 16 cigarettes per day. Not moisturizing at this time.  c/o congestion from mucous in the early morning. \par \par 6/15/2021 OTV 800cGy/6000cGy to the right lunth treatment today. Denies change in SOB or cough. Eating and drinking well. No complaints at this time. Pulse ox 99% RA. Will bring medication list tomorrow. Reviewed skin care, does not wish to use moisturizer at this time. Continues to smoke 16 cigarettes per day, is trying to decrease gradually.

## 2021-06-30 PROCEDURE — 77014: CPT | Mod: 26

## 2021-07-01 PROCEDURE — G6002: CPT | Mod: 26

## 2021-07-01 PROCEDURE — 77427 RADIATION TX MANAGEMENT X5: CPT

## 2021-07-02 ENCOUNTER — LABORATORY RESULT (OUTPATIENT)
Age: 76
End: 2021-07-02

## 2021-07-02 ENCOUNTER — APPOINTMENT (OUTPATIENT)
Dept: INFUSION THERAPY | Facility: CLINIC | Age: 76
End: 2021-07-02

## 2021-07-02 LAB
BACTERIA UR CULT: NORMAL
HCT VFR BLD CALC: 35.3 %
HGB BLD-MCNC: 11.8 G/DL
MAGNESIUM SERPL-MCNC: 1.6 MG/DL
MCHC RBC-ENTMCNC: 29.5 PG
MCHC RBC-ENTMCNC: 33.4 G/DL
MCV RBC AUTO: 88.3 FL
PLATELET # BLD AUTO: 117 K/UL
PMV BLD: 10.6 FL
RBC # BLD: 4 M/UL
RBC # FLD: 12.8 %
WBC # FLD AUTO: 4.97 K/UL

## 2021-07-02 RX ORDER — PACLITAXEL 6 MG/ML
100 INJECTION, SOLUTION, CONCENTRATE INTRAVENOUS ONCE
Refills: 0 | Status: COMPLETED | OUTPATIENT
Start: 2021-07-02 | End: 2021-07-02

## 2021-07-02 RX ORDER — MAGNESIUM SULFATE 500 MG/ML
2 VIAL (ML) INJECTION ONCE
Refills: 0 | Status: COMPLETED | OUTPATIENT
Start: 2021-07-02 | End: 2021-07-02

## 2021-07-02 RX ORDER — FAMOTIDINE 10 MG/ML
20 INJECTION INTRAVENOUS ONCE
Refills: 0 | Status: COMPLETED | OUTPATIENT
Start: 2021-07-02 | End: 2021-07-02

## 2021-07-02 RX ORDER — CARBOPLATIN 50 MG
190 VIAL (EA) INTRAVENOUS ONCE
Refills: 0 | Status: COMPLETED | OUTPATIENT
Start: 2021-07-02 | End: 2021-07-02

## 2021-07-02 RX ORDER — DEXAMETHASONE 0.5 MG/5ML
12 ELIXIR ORAL ONCE
Refills: 0 | Status: COMPLETED | OUTPATIENT
Start: 2021-07-02 | End: 2021-07-02

## 2021-07-02 RX ORDER — FOSAPREPITANT DIMEGLUMINE 150 MG/5ML
150 INJECTION, POWDER, LYOPHILIZED, FOR SOLUTION INTRAVENOUS ONCE
Refills: 0 | Status: COMPLETED | OUTPATIENT
Start: 2021-07-02 | End: 2021-07-02

## 2021-07-02 RX ORDER — DIPHENHYDRAMINE HCL 50 MG
50 CAPSULE ORAL ONCE
Refills: 0 | Status: COMPLETED | OUTPATIENT
Start: 2021-07-02 | End: 2021-07-02

## 2021-07-02 RX ADMIN — FOSAPREPITANT DIMEGLUMINE 450 MILLIGRAM(S): 150 INJECTION, POWDER, LYOPHILIZED, FOR SOLUTION INTRAVENOUS at 11:24

## 2021-07-02 RX ADMIN — FAMOTIDINE 104 MILLIGRAM(S): 10 INJECTION INTRAVENOUS at 11:24

## 2021-07-02 RX ADMIN — Medication 102 MILLIGRAM(S): at 11:24

## 2021-07-02 RX ADMIN — Medication 269 MILLIGRAM(S): at 13:18

## 2021-07-02 RX ADMIN — Medication 50 GRAM(S): at 13:19

## 2021-07-02 RX ADMIN — Medication 12 MILLIGRAM(S): at 11:44

## 2021-07-02 RX ADMIN — PACLITAXEL 266.67 MILLIGRAM(S): 6 INJECTION, SOLUTION, CONCENTRATE INTRAVENOUS at 12:30

## 2021-07-02 RX ADMIN — Medication 50 MILLIGRAM(S): at 12:24

## 2021-07-02 RX ADMIN — FAMOTIDINE 20 MILLIGRAM(S): 10 INJECTION INTRAVENOUS at 12:04

## 2021-07-02 RX ADMIN — Medication 122 MILLIGRAM(S): at 11:23

## 2021-07-06 ENCOUNTER — NON-APPOINTMENT (OUTPATIENT)
Age: 76
End: 2021-07-06

## 2021-07-06 LAB
ALBUMIN SERPL ELPH-MCNC: 4 G/DL
ALP BLD-CCNC: 70 U/L
ALT SERPL-CCNC: 13 U/L
ANION GAP SERPL CALC-SCNC: 10 MMOL/L
AST SERPL-CCNC: 14 U/L
BILIRUB SERPL-MCNC: 0.3 MG/DL
BUN SERPL-MCNC: 25 MG/DL
CALCIUM SERPL-MCNC: 8.9 MG/DL
CHLORIDE SERPL-SCNC: 105 MMOL/L
CO2 SERPL-SCNC: 22 MMOL/L
CREAT SERPL-MCNC: 1.1 MG/DL
GLUCOSE SERPL-MCNC: 128 MG/DL
POTASSIUM SERPL-SCNC: 5.1 MMOL/L
PROT SERPL-MCNC: 5.9 G/DL
SODIUM SERPL-SCNC: 137 MMOL/L

## 2021-07-07 ENCOUNTER — NON-APPOINTMENT (OUTPATIENT)
Age: 76
End: 2021-07-07

## 2021-07-07 VITALS
SYSTOLIC BLOOD PRESSURE: 126 MMHG | OXYGEN SATURATION: 100 % | BODY MASS INDEX: 28.87 KG/M2 | DIASTOLIC BLOOD PRESSURE: 58 MMHG | HEART RATE: 104 BPM | RESPIRATION RATE: 16 BRPM | WEIGHT: 207 LBS | TEMPERATURE: 98.1 F

## 2021-07-07 PROCEDURE — 77387B: CUSTOM | Mod: 26

## 2021-07-07 NOTE — REVIEW OF SYSTEMS
[Dysphagia: Grade 0] : Dysphagia: Grade 0 [Esophagitis: Grade 0] : Esophagitis: Grade 0 [Cough: Grade 1 - Mild symptoms; nonprescription intervention indicated] : Cough: Grade 1 - Mild symptoms; nonprescription intervention indicated [Dyspnea: Grade 1 - Shortness of breath with moderate exertion] : Dyspnea: Grade 1 - Shortness of breath with moderate exertion [Dermatitis Radiation: Grade 0] : Dermatitis Radiation: Grade 0

## 2021-07-09 ENCOUNTER — LABORATORY RESULT (OUTPATIENT)
Age: 76
End: 2021-07-09

## 2021-07-09 ENCOUNTER — APPOINTMENT (OUTPATIENT)
Dept: INFUSION THERAPY | Facility: CLINIC | Age: 76
End: 2021-07-09

## 2021-07-09 LAB
HCT VFR BLD CALC: 35.6 %
HGB BLD-MCNC: 11.8 G/DL
MAGNESIUM SERPL-MCNC: 1.7 MG/DL
MCHC RBC-ENTMCNC: 30.1 PG
MCHC RBC-ENTMCNC: 33.1 G/DL
MCV RBC AUTO: 90.8 FL
PLATELET # BLD AUTO: 113 K/UL
PMV BLD: 10.5 FL
RBC # BLD: 3.92 M/UL
RBC # FLD: 13.2 %
WBC # FLD AUTO: 5.25 K/UL

## 2021-07-09 PROCEDURE — 77014: CPT | Mod: 26

## 2021-07-09 RX ORDER — DIPHENHYDRAMINE HCL 50 MG
50 CAPSULE ORAL ONCE
Refills: 0 | Status: COMPLETED | OUTPATIENT
Start: 2021-07-09 | End: 2021-07-09

## 2021-07-09 RX ORDER — CARBOPLATIN 50 MG
190 VIAL (EA) INTRAVENOUS ONCE
Refills: 0 | Status: COMPLETED | OUTPATIENT
Start: 2021-07-09 | End: 2021-07-09

## 2021-07-09 RX ORDER — DEXAMETHASONE 0.5 MG/5ML
12 ELIXIR ORAL ONCE
Refills: 0 | Status: COMPLETED | OUTPATIENT
Start: 2021-07-09 | End: 2021-07-09

## 2021-07-09 RX ORDER — FOSAPREPITANT DIMEGLUMINE 150 MG/5ML
150 INJECTION, POWDER, LYOPHILIZED, FOR SOLUTION INTRAVENOUS ONCE
Refills: 0 | Status: COMPLETED | OUTPATIENT
Start: 2021-07-09 | End: 2021-07-09

## 2021-07-09 RX ORDER — PACLITAXEL 6 MG/ML
100 INJECTION, SOLUTION, CONCENTRATE INTRAVENOUS ONCE
Refills: 0 | Status: COMPLETED | OUTPATIENT
Start: 2021-07-09 | End: 2021-07-09

## 2021-07-09 RX ORDER — FAMOTIDINE 10 MG/ML
20 INJECTION INTRAVENOUS ONCE
Refills: 0 | Status: COMPLETED | OUTPATIENT
Start: 2021-07-09 | End: 2021-07-09

## 2021-07-09 RX ADMIN — FOSAPREPITANT DIMEGLUMINE 450 MILLIGRAM(S): 150 INJECTION, POWDER, LYOPHILIZED, FOR SOLUTION INTRAVENOUS at 12:41

## 2021-07-09 RX ADMIN — Medication 269 MILLIGRAM(S): at 12:53

## 2021-07-09 RX ADMIN — Medication 102 MILLIGRAM(S): at 12:41

## 2021-07-09 RX ADMIN — PACLITAXEL 266.67 MILLIGRAM(S): 6 INJECTION, SOLUTION, CONCENTRATE INTRAVENOUS at 12:54

## 2021-07-09 RX ADMIN — Medication 122 MILLIGRAM(S): at 12:41

## 2021-07-09 RX ADMIN — FAMOTIDINE 104 MILLIGRAM(S): 10 INJECTION INTRAVENOUS at 12:41

## 2021-07-12 ENCOUNTER — APPOINTMENT (OUTPATIENT)
Dept: HEMATOLOGY ONCOLOGY | Facility: CLINIC | Age: 76
End: 2021-07-12

## 2021-07-12 ENCOUNTER — NON-APPOINTMENT (OUTPATIENT)
Age: 76
End: 2021-07-12

## 2021-07-12 VITALS
BODY MASS INDEX: 29.18 KG/M2 | HEART RATE: 98 BPM | OXYGEN SATURATION: 97 % | DIASTOLIC BLOOD PRESSURE: 60 MMHG | RESPIRATION RATE: 16 BRPM | WEIGHT: 209.25 LBS | TEMPERATURE: 97 F | SYSTOLIC BLOOD PRESSURE: 129 MMHG

## 2021-07-12 LAB
ALBUMIN SERPL ELPH-MCNC: 3.9 G/DL
ALP BLD-CCNC: 74 U/L
ALT SERPL-CCNC: 14 U/L
ANION GAP SERPL CALC-SCNC: 10 MMOL/L
AST SERPL-CCNC: 16 U/L
BILIRUB SERPL-MCNC: 0.2 MG/DL
BUN SERPL-MCNC: 37 MG/DL
CALCIUM SERPL-MCNC: 9.2 MG/DL
CHLORIDE SERPL-SCNC: 107 MMOL/L
CO2 SERPL-SCNC: 22 MMOL/L
CREAT SERPL-MCNC: 1.4 MG/DL
GLUCOSE SERPL-MCNC: 112 MG/DL
POTASSIUM SERPL-SCNC: 6 MMOL/L
PROT SERPL-MCNC: 5.8 G/DL
SODIUM SERPL-SCNC: 139 MMOL/L

## 2021-07-12 PROCEDURE — 77387B: CUSTOM | Mod: 26

## 2021-07-12 PROCEDURE — 77427 RADIATION TX MANAGEMENT X5: CPT

## 2021-07-12 NOTE — REVIEW OF SYSTEMS
[Dysphagia: Grade 0] : Dysphagia: Grade 0 [Esophagitis: Grade 0] : Esophagitis: Grade 0 [Fatigue: Grade 1 - Fatigue relieved by rest] : Fatigue: Grade 1 - Fatigue relieved by rest [Cough: Grade 1 - Mild symptoms; nonprescription intervention indicated] : Cough: Grade 1 - Mild symptoms; nonprescription intervention indicated [Dyspnea: Grade 1 - Shortness of breath with moderate exertion] : Dyspnea: Grade 1 - Shortness of breath with moderate exertion [Dermatitis Radiation: Grade 0] : Dermatitis Radiation: Grade 0

## 2021-07-12 NOTE — DISEASE MANAGEMENT
Patient : Tanesha Levine Age: 89 year old Sex: female   MRN: [id number] Encounter Date: 7/15/2017    2105/A    History     Chief Complaint   Patient presents with   • Numbness     HPI   7/15/2017    10:33 AM Tanesha Levine is a 89 year old female who presents to ED with RUE numbness, with a LKWT of 10:00 PM last night. The patient reports that she felt normal when she went to bed last night, although she cannot remember what time she did go to sleep. Per daughter, the patient usually goes to bed between 10- 10:30 PM. When the patient woke up today at 7 AM, she had RUE numbness, dizziness, and lightheadedness. The patient has a history of previous stroke 3 years ago, and is on anticoagulation. She has an abrasion to her nose, but the patient does not remember any falls. Per daughter, the patient has been more confused over the last 2 days. She denies any other associated sx. There are no other alleviating or modifying factors at this time.      PCP: Troy Blair MD      No Known Allergies    Current Discharge Medication List      CONTINUE these medications which have NOT CHANGED    Details   propafenone (RYTHMOL) 150 MG tablet TAKE 1 TABLET BY MOUTH TWICE DAILY  Qty: 180 tablet, Refills: 2      !! warfarin (COUMADIN) 5 MG tablet Take 1 tablet by mouth daily.  Qty: 184 tablet, Refills: 3    Comments: **Patient requests 90 days supply**      atenolol (TENORMIN) 50 MG tablet TAKE 1 TABLET BY MOUTH DAILY  Qty: 90 tablet, Refills: 3    Comments: **Patient requests 90 days supply**      omeprazole (PRILOSEC) 20 MG capsule Take 1 capsule by mouth daily.  Qty: 90 capsule, Refills: 1      aspirin 81 MG tablet Take 1 tablet by mouth daily.      Cholecalciferol (VITAMIN D3) 2000 UNITS capsule Take 2,000 Units by mouth daily.      !! warfarin (COUMADIN) 2.5 MG tablet Take 1 tablet by mouth every other day. ALTERNATE DAYS OF 5 MG DOSE       !! - Potential duplicate medications found. Please discuss with provider.     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    grade 1   • Urinary incontinence     wears depends       Past Surgical History:   Procedure Laterality Date   • APPENDECTOMY     • EP PACER  12/18/2008    ST CARLEY DUAL CHAMBER;INITIAL IMPLANT 4/09/2005   • EYE SURGERY Bilateral     bilateral cataract surgery   • SERVICE TO GASTROENTEROLOGY  2000    Colonoscopy   • SERVICE TO UROLOGY  1991    TURBT/noninvasive       Family History   Problem Relation Age of Onset   • Arthritis Mother    • Heart disease Mother    • Arthritis Father    • Cancer Father    • Arthritis Sister    • Cancer Sister        Social History   Substance Use Topics   • Smoking status: Former Smoker     Packs/day: 0.30     Years: 10.00     Quit date: 1/1/1954   • Smokeless tobacco: Never Used   • Alcohol use No       Review of Systems   Constitutional: Negative for activity change, appetite change, chills, fatigue and fever.   HENT: Negative for congestion.    Eyes: Negative for discharge and visual disturbance.   Respiratory: Negative for cough and shortness of breath.    Cardiovascular: Negative for chest pain and leg swelling.   Gastrointestinal: Negative for abdominal pain, anal bleeding, diarrhea, nausea and vomiting.   Genitourinary: Negative for decreased urine volume, difficulty urinating, dysuria, frequency, hematuria and urgency.   Musculoskeletal: Negative for arthralgias, myalgias and neck pain.   Skin: Positive for wound (abrasion to nose). Negative for color change and rash.   Neurological: Positive for dizziness, light-headedness and numbness (RUE).  [IIIA] : IIIA Negative for weakness and headaches.   Hematological: Does not bruise/bleed easily.   Psychiatric/Behavioral: Negative for confusion. The patient is not nervous/anxious.        Physical Exam     ED Triage Vitals [07/15/17 1032]   ED Triage Vitals Group      Temp 97.9 °F (36.6 °C)      Pulse 71      Resp 18      BP (!) 210/90      SpO2 98 %      EtCO2 mmHg       Height 5' 3\" (1.6 m)      Weight 143 lb 4.8 oz (65 kg)      Weight Scale Used ED Actual       Physical Exam   Constitutional: Vital signs are normal. She appears well-developed and well-nourished.  Non-toxic appearance. She does not have a sickly appearance.   HENT:   Head: Normocephalic and atraumatic.   Mouth/Throat: No oropharyngeal exudate.   Abrasion to bridge of nose.    Eyes: Conjunctivae and EOM are normal. Pupils are equal, round, and reactive to light.   Neck: Normal range of motion. Neck supple.   No cervical spine tenderness.    Cardiovascular: Normal rate, regular rhythm, normal heart sounds and intact distal pulses.    Pulmonary/Chest: Breath sounds normal. No respiratory distress.   Abdominal: Soft. Bowel sounds are normal.   Musculoskeletal: Normal range of motion.   Neurological: She is alert. GCS eye subscore is 4. GCS verbal subscore is 5. GCS motor subscore is 6.   Able to lift BLE against gravity without difficulties.   4/5 hand grasp on right. 5/5 hand grasp on left.    Skin: Skin is warm and dry. No rash noted.   Psychiatric: She has a normal mood and affect. Her behavior is normal.   Nursing note and vitals reviewed.      ED Course     Procedures    Lab Results     Results for orders placed or performed during the hospital encounter of 07/15/17   CBC & Auto Differential   Result Value Ref Range    WBC 6.6 4.2 - 11.0 K/mcL    RBC 4.01 4.00 - 5.20 mil/mcL    HGB 11.1 (L) 12.0 - 15.5 g/dL    HCT 36.0 36.0 - 46.5 %    MCV 89.8 78.0 - 100.0 fl    MCH 27.7 26.0 - 34.0 pg    MCHC 30.8 (L) 32.0 - 36.5 g/dL    RDW-CV 16.5 (H) 11.0 - 15.0 %     [TTNM] : 2c  140 - 450 K/mcL    DIFF TYPE AUTOMATED DIFFERENTIAL     Neutrophil 43 %    LYMPH 38 %    MONO 16 %    EOSIN 2 %    BASO 1 %    Absolute Neutrophil 2.9 1.8 - 7.7 K/mcL    Absolute Lymph 2.5 1.0 - 4.0 K/mcL    Absolute Mono 1.0 (H) 0.3 - 0.9 K/mcL    Absolute Eos 0.1 0.1 - 0.5 K/mcL    Absolute Baso 0.0 0.0 - 0.3 K/mcL   Prothrombin Time   Result Value Ref Range    PROTIME 34.2 (H) 9.7 - 11.8 sec    INR 3.1    Partial Thromboplastin Time   Result Value Ref Range    PTT 37 (H) 22 - 30 sec   Comprehensive Metabolic Panel   Result Value Ref Range    Sodium 141 135 - 145 mmol/L    Potassium 4.0 3.4 - 5.1 mmol/L    Chloride 107 98 - 107 mmol/L    Carbon Dioxide 29 21 - 32 mmol/L    Anion Gap 9 (L) 10 - 20 mmol/L    Glucose 83 65 - 99 mg/dL    BUN 16 6 - 20 mg/dL    Creatinine 0.90 0.51 - 0.95 mg/dL    GFR Estimate,  65     GFR Estimate, Non African American 56     BUN/Creatinine Ratio 18 7 - 25    CALCIUM 8.6 8.4 - 10.2 mg/dL    TOTAL BILIRUBIN 0.5 0.2 - 1.0 mg/dL    AST/SGOT 21 <38 Units/L    ALT/SGPT 19 <79 Units/L    ALK PHOSPHATASE 87 45 - 117 Units/L    TOTAL PROTEIN 7.0 6.4 - 8.2 g/dL    Albumin 3.5 (L) 3.6 - 5.1 g/dL    GLOBULIN 3.5 2.0 - 4.0 g/dL    A/G Ratio, Serum 1.0 1.0 - 2.4   B Type Natriuretic Peptide BNP   Result Value Ref Range    B-TYPE NATRIURETIC PEPTIDE 294 (H) <100 pg/mL   Urinalysis & Reflex Micro with Culture if Indicated   Result Value Ref Range    COLOR YELLOW YELLOW    APPEARANCE CLEAR     GLUCOSE(URINE) NEGATIVE NEGATIVE mg/dL    BILIRUBIN NEGATIVE NEGATIVE    KETONES NEGATIVE NEGATIVE mg/dL    SPECIFIC GRAVITY 1.025 1.005 - 1.030    BLOOD SMALL (A) NEGATIVE    pH 6.0 5.0 - 7.0 Units    PROTEIN(URINE) NEGATIVE NEGATIVE mg/dL    UROBILINOGEN 0.2 0.0 - 1.0 mg/dL    NITRITE POSITIVE (A) NEGATIVE    LEUKOCYTE ESTERASE SMALL (A) NEGATIVE    SPECIMEN TYPE URINE, CATHETERIZED, STRAIGHT    ISTAT8 Venous - Point of Care   Result Value Ref Range    Sodium  135 - 145 mmol/L  [NTNM] : 2    Potassium POC 4.0 3.4 - 5.1 mmol/L    Chloride  98 - 107 mmol/L    CO2 Total 26 (H) 19 - 24 mmol/L    BUN POC 17 6 - 20 mg/dL    GLUCOSE POC 80 65 - 99 mg/dL    HEMATOCRIT POC 36.0 36.0 - 46.5 %    PH Venous POC 7.38 7.35 - 7.45 Units    PCO2 Venous 41 38 - 51 mm Hg    HCO3 Venous 25 22 - 28 mmol/L    Base Excess Venous 0 0 - 2 mmol/L    ANION GAP POC 16 mmol/L    Hemoglobin POC 12.2 12.0 - 15.5 g/dL   Troponin I - Point of Care   Result Value Ref Range    Troponin I POC <0.10 <0.10 ng/mL   Urinalysis Microscopic   Result Value Ref Range    Squamous EPI'S 1 to 5 0 - 5 /hpf    RBC 26 to 100 0 - 3 /hpf    WBC 1 to 5 0 - 5 /hpf    BACTERIA LARGE (A) NONE SEEN /hpf    Hyaline Casts NONE SEEN 0 - 5 /lpf       EKG Results     EKG Interpretation  Time: 10:45 AM  Rate: 90 BPM  Rhythm: AV sequential or dual chamber electronic pacemaker   Abnormality: When compared to 04/22/2015, vent rate has increased by 28 BPM.     EKG interpreted by ED physician    Radiology Results     Imaging Results          CT Head Brain (Final result)  Result time 07/15/17 11:21:30    Final result                 Impression:    IMPRESSION: No evidence of acute intracranial abnormality.    Chronic small vessel ischemic disease and age-appropriate atrophy.               Narrative:    EXAM: CT HEAD WO CONTRAST    CLINICAL HISTORY: STROKE    TECHNIQUE: Helical imaging through the brain was performed without IV  contrast.    COMPARISON: None.    FINDINGS: There are no abnormal intra or extra-axial fluid collections.  There is no intracranial hemorrhage. No mass or mass effect is identified.  There is confluent low attenuation in the periventricular distribution  consistent with chronic small vessel ischemic disease. Age-appropriate  atrophy is present.                               XR CHEST AP OR PA - PORTABLE (Final result)  Result time 07/15/17 11:14:08    Final result                 Impression:    IMPRESSION:  1. Minimal presumed left  [MTNM] : 0 [de-identified] : 7710cGy basilar atelectasis               Narrative:    EXAM: XR CHEST AP OR PA    DATE: 7/15/2017 11:12 AM    HISTORY: weakness    COMPARISON: 01/02/2017    FINDINGS: The cardiovascular silhouette and vasculature are normal.  The  lung volumes are increased.  There is no effusion or consolidation. There  is no pneumothorax.   The regional skeleton is unremarkable.  Implantable  cardiac device overlies left hemithorax and appears stable. Minimal patchy  opacity left lung base most consistent with atelectasis.                                ED Medication Orders     Start Ordered     Status Ordering Provider    07/15/17 1239 07/15/17 1039  sodium chloride (PF) 0.9 % injection 2 mL  (Capped IV)  2 times per day      Acknowledged SIERRA REEVES    07/15/17 1218 07/15/17 1217  cefTRIAXone (ROCEPHIN) 1,000 mg in sodium chloride 0.9 % 100 mL IVPB  ONCE      Last MAR action:  Infusion Continues to Floor SIER[facility] REEVES    07/15/17 1038 07/15/17 1039  sodium chloride (PF) 0.9 % injection 2 mL  (Capped IV)  PRN      Acknowledged SIERRA REEVES          [facility]    Vitals  Vitals:    07/15/17 1145 07/15/17 1200 07/15/17 1215 07/15/17 1243   BP: 187/80 176/79 179/73    Pulse: 68 68 70 69   Resp: 18 18 20    Temp:    98 °F (36.7 °C)   TempSrc:    Oral   SpO2: 98% 98% 96% 98%   Weight:       Height:               ED Course  10:50 AM: Per RN, the patient's NIH is 1. She passed her dysphagia.     11:54 AM Consult: I spoke with Dr. Bela Kelly for Dr. Troy Blair (PMD) regarding the patient's presentation with RUE numbness and previous history of stroke. We discussed the ED workup, including the negative head CT and NIH of 1, and we agreed upon the plan of care.      12:00 PM Consult: I spoke with Dr. Salas (neurologist) regarding the patient's case. He agrees with the plan and will consult.     12:12 PM Recheck: I informed the patient's daughter that the head CT did not show an acute stroke. Given the patient's confusion, right upper  [de-identified] : 6000cGy extremity numbness, and history of stroke, we agreed upon the plan of care. All questions were addressed and answered.     12:27 PM Recheck: Initially the patient was not agreeable to care beyond the ED, but per daughter, is now agreeable. All questions were addressed and answered.     MDM  Patient with right sided weakness, paresthesias and dysarthria.  NIH is 1. On coumadin with INR of 3.0.  CT shows no evidence of any acute disease.  Does not meet criteria for TPA given INR of 3.0.  Also evidence of UTI and was given IV rocephin.  The plan will be for admission to a telemetry bed with neurology on consult.        Clinical Impression  ED Diagnoses        Final diagnoses    Atrial fibrillation, unspecified type (CMS/HCC)     Weakness of right arm     Dysarthria           Pt to be admitted to Dr. Troy Blair in serious condition.    Critical Care time spent on this patient outside of billable procedures: None  ________________________________________________________________________     I have reviewed the information recorded by the scribe for accuracy and agree with its contents.    Gabe Salazar acting as scribe for Nikunj Tobias DO    Physician: Dr. Nikunj Tobias  Physician #: [id number]  Scribe: Gabe Tobias DO  07/15/17 6360     [de-identified] : Right lung + med

## 2021-07-12 NOTE — HISTORY OF PRESENT ILLNESS
[FreeTextEntry1] : 2021 OTV 4200cGy/6000cGy to the right lung: Continues to have productive cough and SOB on exertion. Smoking 15 cigarettes per day. Eating and drinking well. Gained 2 lbs this week. Scheduled for blood work today and chemo Friday. \par \par 2021 OTV 3600cGy/6000cGy to the right lung: C/o occasional productive cough, whitish sputum. No changes in SOB on exertion. Continues to smoke 15-16 cigarettes per day. No skin reaction noted. Not moisturizing at this time. Lost 2 lbs since last week. States he is eating and drinking well. Denies dysphagia or esophagitis. Offered to contact nutritionist. Pt refused. Scheduled for chemo 2021.\par \par 21 OTV 2800cGY/6000cGy to the right lung: C/o slight SOB. Has dry cough at night and early morning. Denies uses any moisturizer. Attempting to cut down cigarettes. Denies any N/V/D. \par \par 2021 OTV 1800cGy/6000cGy to the right lung: Pt denies changes in SOB or cough. Denies dysphagia or nausea. Continues to smoke 15- 16 cigarettes per day. Not moisturizing at this time.  c/o congestion from mucous in the early morning. \par \par 6/15/2021 OTV 800cGy/6000cGy to the right lunth treatment today. Denies change in SOB or cough. Eating and drinking well. No complaints at this time. Pulse ox 99% RA. Will bring medication list tomorrow. Reviewed skin care, does not wish to use moisturizer at this time. Continues to smoke 16 cigarettes per day, is trying to decrease gradually.

## 2021-07-13 PROCEDURE — 77387B: CUSTOM | Mod: 26

## 2021-07-14 PROCEDURE — 77387B: CUSTOM | Mod: 26

## 2021-07-15 ENCOUNTER — APPOINTMENT (OUTPATIENT)
Dept: HEMATOLOGY ONCOLOGY | Facility: CLINIC | Age: 76
End: 2021-07-15
Payer: MEDICARE

## 2021-07-15 ENCOUNTER — LABORATORY RESULT (OUTPATIENT)
Age: 76
End: 2021-07-15

## 2021-07-15 VITALS
DIASTOLIC BLOOD PRESSURE: 66 MMHG | TEMPERATURE: 97 F | SYSTOLIC BLOOD PRESSURE: 140 MMHG | WEIGHT: 208 LBS | BODY MASS INDEX: 29.12 KG/M2 | HEART RATE: 99 BPM | OXYGEN SATURATION: 99 % | RESPIRATION RATE: 18 BRPM | HEIGHT: 71 IN

## 2021-07-15 PROCEDURE — 77387B: CUSTOM | Mod: 26

## 2021-07-15 PROCEDURE — 99213 OFFICE O/P EST LOW 20 MIN: CPT

## 2021-07-16 ENCOUNTER — APPOINTMENT (OUTPATIENT)
Dept: INFUSION THERAPY | Facility: CLINIC | Age: 76
End: 2021-07-16

## 2021-07-16 PROCEDURE — 77387B: CUSTOM | Mod: 26,59

## 2021-07-16 PROCEDURE — 77014: CPT | Mod: 26

## 2021-07-16 RX ORDER — FOSAPREPITANT DIMEGLUMINE 150 MG/5ML
150 INJECTION, POWDER, LYOPHILIZED, FOR SOLUTION INTRAVENOUS ONCE
Refills: 0 | Status: COMPLETED | OUTPATIENT
Start: 2021-07-16 | End: 2021-07-16

## 2021-07-16 RX ORDER — DEXAMETHASONE 0.5 MG/5ML
12 ELIXIR ORAL ONCE
Refills: 0 | Status: COMPLETED | OUTPATIENT
Start: 2021-07-16 | End: 2021-07-16

## 2021-07-16 RX ORDER — PACLITAXEL 6 MG/ML
100 INJECTION, SOLUTION, CONCENTRATE INTRAVENOUS ONCE
Refills: 0 | Status: COMPLETED | OUTPATIENT
Start: 2021-07-16 | End: 2021-07-16

## 2021-07-16 RX ORDER — FAMOTIDINE 10 MG/ML
20 INJECTION INTRAVENOUS ONCE
Refills: 0 | Status: COMPLETED | OUTPATIENT
Start: 2021-07-16 | End: 2021-07-16

## 2021-07-16 RX ORDER — MAGNESIUM SULFATE 500 MG/ML
2 VIAL (ML) INJECTION ONCE
Refills: 0 | Status: COMPLETED | OUTPATIENT
Start: 2021-07-16 | End: 2021-07-16

## 2021-07-16 RX ORDER — SODIUM CHLORIDE 9 MG/ML
1000 INJECTION INTRAMUSCULAR; INTRAVENOUS; SUBCUTANEOUS
Refills: 0 | Status: DISCONTINUED | OUTPATIENT
Start: 2021-07-16 | End: 2021-11-10

## 2021-07-16 RX ORDER — CARBOPLATIN 50 MG
190 VIAL (EA) INTRAVENOUS ONCE
Refills: 0 | Status: COMPLETED | OUTPATIENT
Start: 2021-07-16 | End: 2021-07-16

## 2021-07-16 RX ORDER — DIPHENHYDRAMINE HCL 50 MG
50 CAPSULE ORAL ONCE
Refills: 0 | Status: COMPLETED | OUTPATIENT
Start: 2021-07-16 | End: 2021-07-16

## 2021-07-16 RX ADMIN — FAMOTIDINE 104 MILLIGRAM(S): 10 INJECTION INTRAVENOUS at 11:00

## 2021-07-16 RX ADMIN — Medication 50 MILLIGRAM(S): at 11:40

## 2021-07-16 RX ADMIN — Medication 50 GRAM(S): at 13:05

## 2021-07-16 RX ADMIN — PACLITAXEL 100 MILLIGRAM(S): 6 INJECTION, SOLUTION, CONCENTRATE INTRAVENOUS at 13:00

## 2021-07-16 RX ADMIN — Medication 12 MILLIGRAM(S): at 11:00

## 2021-07-16 RX ADMIN — Medication 102 MILLIGRAM(S): at 11:20

## 2021-07-16 RX ADMIN — SODIUM CHLORIDE 500 MILLILITER(S): 9 INJECTION INTRAMUSCULAR; INTRAVENOUS; SUBCUTANEOUS at 12:40

## 2021-07-16 RX ADMIN — FAMOTIDINE 20 MILLIGRAM(S): 10 INJECTION INTRAVENOUS at 11:20

## 2021-07-16 RX ADMIN — Medication 269 MILLIGRAM(S): at 13:05

## 2021-07-16 RX ADMIN — FOSAPREPITANT DIMEGLUMINE 450 MILLIGRAM(S): 150 INJECTION, POWDER, LYOPHILIZED, FOR SOLUTION INTRAVENOUS at 11:40

## 2021-07-16 RX ADMIN — PACLITAXEL 266.67 MILLIGRAM(S): 6 INJECTION, SOLUTION, CONCENTRATE INTRAVENOUS at 12:00

## 2021-07-16 RX ADMIN — Medication 122 MILLIGRAM(S): at 10:40

## 2021-07-16 RX ADMIN — FOSAPREPITANT DIMEGLUMINE 150 MILLIGRAM(S): 150 INJECTION, POWDER, LYOPHILIZED, FOR SOLUTION INTRAVENOUS at 12:00

## 2021-07-17 LAB
ALBUMIN SERPL ELPH-MCNC: 4.1 G/DL
ALBUMIN SERPL ELPH-MCNC: 4.5 G/DL
ALP BLD-CCNC: 72 U/L
ALP BLD-CCNC: 73 U/L
ALT SERPL-CCNC: 16 U/L
ALT SERPL-CCNC: 17 U/L
ANION GAP SERPL CALC-SCNC: 12 MMOL/L
ANION GAP SERPL CALC-SCNC: 13 MMOL/L
AST SERPL-CCNC: 13 U/L
AST SERPL-CCNC: 15 U/L
BILIRUB SERPL-MCNC: 0.2 MG/DL
BILIRUB SERPL-MCNC: 0.5 MG/DL
BUN SERPL-MCNC: 26 MG/DL
BUN SERPL-MCNC: 34 MG/DL
CALCIUM SERPL-MCNC: 9.2 MG/DL
CALCIUM SERPL-MCNC: 9.2 MG/DL
CHLORIDE SERPL-SCNC: 100 MMOL/L
CHLORIDE SERPL-SCNC: 102 MMOL/L
CO2 SERPL-SCNC: 23 MMOL/L
CO2 SERPL-SCNC: 24 MMOL/L
CREAT SERPL-MCNC: 1.1 MG/DL
CREAT SERPL-MCNC: 1.2 MG/DL
GLUCOSE SERPL-MCNC: 107 MG/DL
GLUCOSE SERPL-MCNC: 163 MG/DL
HCT VFR BLD CALC: 35.6 %
HGB BLD-MCNC: 12 G/DL
MAGNESIUM SERPL-MCNC: 1.5 MG/DL
MCHC RBC-ENTMCNC: 30.5 PG
MCHC RBC-ENTMCNC: 33.7 G/DL
MCV RBC AUTO: 90.4 FL
PLATELET # BLD AUTO: 168 K/UL
PMV BLD: 9.4 FL
POTASSIUM SERPL-SCNC: 4.6 MMOL/L
POTASSIUM SERPL-SCNC: 5 MMOL/L
PROT SERPL-MCNC: 6.2 G/DL
PROT SERPL-MCNC: 6.4 G/DL
RBC # BLD: 3.94 M/UL
RBC # FLD: 13.3 %
SODIUM SERPL-SCNC: 135 MMOL/L
SODIUM SERPL-SCNC: 139 MMOL/L
WBC # FLD AUTO: 4.08 K/UL

## 2021-07-19 ENCOUNTER — NON-APPOINTMENT (OUTPATIENT)
Age: 76
End: 2021-07-19

## 2021-07-19 VITALS
DIASTOLIC BLOOD PRESSURE: 64 MMHG | OXYGEN SATURATION: 99 % | WEIGHT: 206.13 LBS | BODY MASS INDEX: 28.75 KG/M2 | TEMPERATURE: 96.4 F | RESPIRATION RATE: 16 BRPM | HEART RATE: 110 BPM | SYSTOLIC BLOOD PRESSURE: 152 MMHG

## 2021-07-19 PROCEDURE — 77427 RADIATION TX MANAGEMENT X5: CPT

## 2021-07-19 NOTE — REVIEW OF SYSTEMS
[Fatigue: Grade 1 - Fatigue relieved by rest] : Fatigue: Grade 1 - Fatigue relieved by rest [Cough: Grade 1 - Mild symptoms; nonprescription intervention indicated] : Cough: Grade 1 - Mild symptoms; nonprescription intervention indicated [Dyspnea: Grade 1 - Shortness of breath with moderate exertion] : Dyspnea: Grade 1 - Shortness of breath with moderate exertion [Dermatitis Radiation: Grade 0] : Dermatitis Radiation: Grade 0

## 2021-07-19 NOTE — HISTORY OF PRESENT ILLNESS
[FreeTextEntry1] : 2021 OTV 5200cGy/6000cGy to the right lung: States productive cough and SOB are unchanged. Decreased to 13 cigarettes per day. Eating and drinking well. No skin reaction noted. \par \par 2021 OTV 4200cGy/6000cGy to the right lung: Continues to have productive cough and SOB on exertion. Smoking 15 cigarettes per day. Eating and drinking well. Gained 2 lbs this week. Scheduled for blood work today and chemo Friday. \par \par 2021 OTV 3600cGy/6000cGy to the right lung: C/o occasional productive cough, whitish sputum. No changes in SOB on exertion. Continues to smoke 15-16 cigarettes per day. No skin reaction noted. Not moisturizing at this time. Lost 2 lbs since last week. States he is eating and drinking well. Denies dysphagia or esophagitis. Offered to contact nutritionist. Pt refused. Scheduled for chemo 2021.\par \par 21 OTV 2800cGY/6000cGy to the right lung: C/o slight SOB. Has dry cough at night and early morning. Denies uses any moisturizer. Attempting to cut down cigarettes. Denies any N/V/D. \par \par 2021 OTV 1800cGy/6000cGy to the right lung: Pt denies changes in SOB or cough. Denies dysphagia or nausea. Continues to smoke 15- 16 cigarettes per day. Not moisturizing at this time.  c/o congestion from mucous in the early morning. \par \par 6/15/2021 OTV 800cGy/6000cGy to the right lunth treatment today. Denies change in SOB or cough. Eating and drinking well. No complaints at this time. Pulse ox 99% RA. Will bring medication list tomorrow. Reviewed skin care, does not wish to use moisturizer at this time. Continues to smoke 16 cigarettes per day, is trying to decrease gradually.

## 2021-07-19 NOTE — DISEASE MANAGEMENT
[Clinical] : TNM Stage: c [IIIA] : IIIA [TTNM] : 2c [NTNM] : 2 [MTNM] : 0 [de-identified] : 6770cGy [de-identified] : 6000cGy [de-identified] : Right lung + med

## 2021-07-19 NOTE — PHYSICAL EXAM
[Sclera] : the sclera and conjunctiva were normal [] : no respiratory distress [Exaggerated Use Of Accessory Muscles For Inspiration] : no accessory muscle use [Heart Rate And Rhythm] : heart rate and rhythm were normal [Heart Sounds] : normal S1 and S2 [Normal] : oriented to person, place and time, the affect was normal, the mood was normal and not anxious [de-identified] : Mild erythema on skin

## 2021-07-20 PROCEDURE — G6002: CPT | Mod: 26

## 2021-07-21 ENCOUNTER — APPOINTMENT (OUTPATIENT)
Dept: HEMATOLOGY ONCOLOGY | Facility: CLINIC | Age: 76
End: 2021-07-21

## 2021-07-21 PROCEDURE — G6002: CPT | Mod: 26

## 2021-07-22 PROCEDURE — 77387B: CUSTOM | Mod: 26

## 2021-07-23 ENCOUNTER — OUTPATIENT (OUTPATIENT)
Dept: OUTPATIENT SERVICES | Facility: HOSPITAL | Age: 76
LOS: 1 days | Discharge: HOME | End: 2021-07-23
Payer: MEDICARE

## 2021-07-23 DIAGNOSIS — C34.11 MALIGNANT NEOPLASM OF UPPER LOBE, RIGHT BRONCHUS OR LUNG: ICD-10-CM

## 2021-07-27 LAB
ALBUMIN SERPL ELPH-MCNC: 4.1 G/DL
ALP BLD-CCNC: 78 U/L
ALT SERPL-CCNC: 19 U/L
ANION GAP SERPL CALC-SCNC: 14 MMOL/L
AST SERPL-CCNC: 17 U/L
BILIRUB SERPL-MCNC: 0.2 MG/DL
BUN SERPL-MCNC: 31 MG/DL
CALCIUM SERPL-MCNC: 9.4 MG/DL
CHLORIDE SERPL-SCNC: 102 MMOL/L
CO2 SERPL-SCNC: 22 MMOL/L
CREAT SERPL-MCNC: 1.1 MG/DL
GLUCOSE SERPL-MCNC: 154 MG/DL
MAGNESIUM SERPL-MCNC: 1.5 MG/DL
POTASSIUM SERPL-SCNC: 4.6 MMOL/L
PROT SERPL-MCNC: 6.3 G/DL
SODIUM SERPL-SCNC: 138 MMOL/L

## 2021-07-27 NOTE — HISTORY OF PRESENT ILLNESS
[FreeTextEntry1] : 2021 OTV 3600cGy/6000cGy to the right lung: C/o occasional productive cough, whitish sputum. No changes in SOB on exertion. Continues to smoke 15-16 cigarettes per day. No skin reaction noted. Not moisturizing at this time. Lost 2 lbs since last week. States he is eating and drinking well. Denies dysphagia or esophagitis. Offered to contact nutritionist. Pt refused. Scheduled for chemo 2021.\par \par 21 OTV 2800cGY/6000cGy to the right lung: C/o slight SOB. Has dry cough at night and early morning. Denies uses any moisturizer. Attempting to cut down cigarettes. Denies any N/V/D. \par \par 2021 OTV 1800cGy/6000cGy to the right lung: Pt denies changes in SOB or cough. Denies dysphagia or nausea. Continues to smoke 15- 16 cigarettes per day. Not moisturizing at this time.  c/o congestion from mucous in the early morning. \par \par 6/15/2021 OTV 800cGy/6000cGy to the right lunth treatment today. Denies change in SOB or cough. Eating and drinking well. No complaints at this time. Pulse ox 99% RA. Will bring medication list tomorrow. Reviewed skin care, does not wish to use moisturizer at this time. Continues to smoke 16 cigarettes per day, is trying to decrease gradually.

## 2021-07-27 NOTE — DISEASE MANAGEMENT
[Clinical] : TNM Stage: c [IIIA] : IIIA [TTNM] : 2c [NTNM] : 2 [MTNM] : 0 [de-identified] : Right lung + med

## 2021-07-27 NOTE — PHYSICAL EXAM
[Normal] : oriented to person, place and time, the affect was normal, the mood was normal and not anxious [de-identified] : Wheezes on the left [de-identified] : systolic murmur [de-identified] : overweight

## 2021-07-27 NOTE — REVIEW OF SYSTEMS
[Chest Pain] : chest pain [Shortness Of Breath] : shortness of breath [SOB on Exertion] : shortness of breath during exertion [Negative] : Cardiovascular [FreeTextEntry5] : due to coughing

## 2021-07-27 NOTE — PHYSICAL EXAM
[Normal] : oriented to person, place and time, the affect was normal, the mood was normal and not anxious [de-identified] : Wheezes on the left [de-identified] : systolic murmur

## 2021-07-27 NOTE — HISTORY OF PRESENT ILLNESS
[FreeTextEntry1] : Kyung Vasquez was seen in consultation with his son for his lung cancer.  He is a 75 year old current smoker who presented to the ER short of breath.  On evaluation significant CAD was noted and work-up commenced to  proceed with a CABG.  However this found a 2.7 cm mass in the RUL.  By CT and PET there were right para tracheal nodes.  The PET was negative outside the chest.  A 5 mm ANIA nodule was PET negative.  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   He was discussed at thoracic tumor board and the consensus was to proceed with chemo/RT for the lung cancer before any cardiac surgery.   He is here to discuss the radiation portion.

## 2021-07-29 ENCOUNTER — APPOINTMENT (OUTPATIENT)
Dept: INFUSION THERAPY | Facility: CLINIC | Age: 76
End: 2021-07-29

## 2021-07-29 RX ORDER — MAGNESIUM SULFATE 500 MG/ML
2 VIAL (ML) INJECTION ONCE
Refills: 0 | Status: COMPLETED | OUTPATIENT
Start: 2021-07-29 | End: 2021-07-29

## 2021-07-29 RX ADMIN — Medication 50 GRAM(S): at 15:24

## 2021-07-29 RX ADMIN — Medication 2 GRAM(S): at 16:24

## 2021-07-30 LAB — MAGNESIUM SERPL-MCNC: 1.4 MG/DL

## 2021-07-30 RX ORDER — THIAMINE HCL 100 MG
500 TABLET ORAL
Qty: 5 | Refills: 1 | Status: COMPLETED | COMMUNITY
Start: 2021-07-30 | End: 2021-08-09

## 2021-08-02 ENCOUNTER — APPOINTMENT (OUTPATIENT)
Dept: INFUSION THERAPY | Facility: CLINIC | Age: 76
End: 2021-08-02

## 2021-08-02 RX ORDER — PROCHLORPERAZINE MALEATE 10 MG/1
10 TABLET ORAL EVERY 6 HOURS
Qty: 120 | Refills: 1 | Status: ACTIVE | COMMUNITY
Start: 2021-05-27 | End: 1900-01-01

## 2021-08-02 RX ORDER — MAGNESIUM SULFATE 500 MG/ML
2 VIAL (ML) INJECTION ONCE
Refills: 0 | Status: COMPLETED | OUTPATIENT
Start: 2021-08-02 | End: 2021-08-02

## 2021-08-02 RX ADMIN — Medication 2 GRAM(S): at 16:37

## 2021-08-02 RX ADMIN — Medication 50 GRAM(S): at 15:36

## 2021-08-06 ENCOUNTER — APPOINTMENT (OUTPATIENT)
Dept: INFUSION THERAPY | Facility: CLINIC | Age: 76
End: 2021-08-06

## 2021-08-06 ENCOUNTER — OUTPATIENT (OUTPATIENT)
Dept: OUTPATIENT SERVICES | Facility: HOSPITAL | Age: 76
LOS: 1 days | Discharge: HOME | End: 2021-08-06

## 2021-08-06 DIAGNOSIS — Z86.39 PERSONAL HISTORY OF OTHER ENDOCRINE, NUTRITIONAL AND METABOLIC DISEASE: ICD-10-CM

## 2021-08-06 DIAGNOSIS — C34.90 MALIGNANT NEOPLASM OF UNSPECIFIED PART OF UNSPECIFIED BRONCHUS OR LUNG: ICD-10-CM

## 2021-08-06 DIAGNOSIS — Z51.11 ENCOUNTER FOR ANTINEOPLASTIC CHEMOTHERAPY: ICD-10-CM

## 2021-08-06 DIAGNOSIS — R91.1 SOLITARY PULMONARY NODULE: ICD-10-CM

## 2021-08-06 DIAGNOSIS — F17.200 NICOTINE DEPENDENCE, UNSPECIFIED, UNCOMPLICATED: ICD-10-CM

## 2021-08-06 DIAGNOSIS — C34.10 MALIGNANT NEOPLASM OF UPPER LOBE, UNSPECIFIED BRONCHUS OR LUNG: ICD-10-CM

## 2021-08-06 DIAGNOSIS — R59.0 LOCALIZED ENLARGED LYMPH NODES: ICD-10-CM

## 2021-08-06 DIAGNOSIS — Z86.79 PERSONAL HISTORY OF OTHER DISEASES OF THE CIRCULATORY SYSTEM: ICD-10-CM

## 2021-08-06 DIAGNOSIS — I35.0 NONRHEUMATIC AORTIC (VALVE) STENOSIS: ICD-10-CM

## 2021-08-06 DIAGNOSIS — I25.10 ATHEROSCLEROTIC HEART DISEASE OF NATIVE CORONARY ARTERY WITHOUT ANGINA PECTORIS: ICD-10-CM

## 2021-08-06 RX ORDER — MAGNESIUM SULFATE 500 MG/ML
2 VIAL (ML) INJECTION ONCE
Refills: 0 | Status: COMPLETED | OUTPATIENT
Start: 2021-08-06 | End: 2021-08-06

## 2021-08-06 RX ADMIN — Medication 50 GRAM(S): at 15:40

## 2021-08-06 RX ADMIN — Medication 2 GRAM(S): at 16:40

## 2021-08-09 LAB
MAGNESIUM SERPL-MCNC: 1.4 MG/DL
MAGNESIUM SERPL-MCNC: 1.6 MG/DL

## 2021-08-12 ENCOUNTER — LABORATORY RESULT (OUTPATIENT)
Age: 76
End: 2021-08-12

## 2021-08-12 ENCOUNTER — APPOINTMENT (OUTPATIENT)
Dept: INFUSION THERAPY | Facility: CLINIC | Age: 76
End: 2021-08-12
Payer: MEDICARE

## 2021-08-12 ENCOUNTER — APPOINTMENT (OUTPATIENT)
Dept: HEMATOLOGY ONCOLOGY | Facility: CLINIC | Age: 76
End: 2021-08-12
Payer: MEDICARE

## 2021-08-12 PROCEDURE — 99214 OFFICE O/P EST MOD 30 MIN: CPT

## 2021-08-12 RX ORDER — DRONABINOL 2.5 MG/1
2.5 CAPSULE ORAL TWICE DAILY
Qty: 30 | Refills: 0 | Status: ACTIVE | COMMUNITY
Start: 2021-08-12 | End: 1900-01-01

## 2021-08-12 RX ORDER — SODIUM CHLORIDE 9 MG/ML
1000 INJECTION INTRAMUSCULAR; INTRAVENOUS; SUBCUTANEOUS
Refills: 0 | Status: DISCONTINUED | OUTPATIENT
Start: 2021-08-12 | End: 2021-11-30

## 2021-08-12 RX ORDER — DRONABINOL 2.5 MG
1 CAPSULE ORAL
Qty: 0 | Refills: 0 | DISCHARGE
Start: 2021-08-12

## 2021-08-12 RX ORDER — MAGNESIUM SULFATE 500 MG/ML
2 VIAL (ML) INJECTION ONCE
Refills: 0 | Status: COMPLETED | OUTPATIENT
Start: 2021-08-12 | End: 2021-08-12

## 2021-08-12 RX ADMIN — SODIUM CHLORIDE 500 MILLILITER(S): 9 INJECTION INTRAMUSCULAR; INTRAVENOUS; SUBCUTANEOUS at 16:35

## 2021-08-12 RX ADMIN — Medication 50 GRAM(S): at 17:48

## 2021-08-13 LAB
ALBUMIN SERPL ELPH-MCNC: 3.6 G/DL
ALP BLD-CCNC: 78 U/L
ALT SERPL-CCNC: 38 U/L
ANION GAP SERPL CALC-SCNC: 12 MMOL/L
AST SERPL-CCNC: 33 U/L
BILIRUB SERPL-MCNC: 0.3 MG/DL
BUN SERPL-MCNC: 23 MG/DL
CALCIUM SERPL-MCNC: 9.2 MG/DL
CHLORIDE SERPL-SCNC: 99 MMOL/L
CO2 SERPL-SCNC: 24 MMOL/L
CREAT SERPL-MCNC: 1.2 MG/DL
GLUCOSE SERPL-MCNC: 156 MG/DL
HCT VFR BLD CALC: 28.7 %
HGB BLD-MCNC: 9.7 G/DL
MAGNESIUM SERPL-MCNC: 1.7 MG/DL
MCHC RBC-ENTMCNC: 30.7 PG
MCHC RBC-ENTMCNC: 33.8 G/DL
MCV RBC AUTO: 90.8 FL
PLATELET # BLD AUTO: 302 K/UL
PMV BLD: 10.6 FL
POTASSIUM SERPL-SCNC: 5 MMOL/L
PROT SERPL-MCNC: 6.7 G/DL
RBC # BLD: 3.16 M/UL
RBC # FLD: 15.5 %
SODIUM SERPL-SCNC: 135 MMOL/L
WBC # FLD AUTO: 8.13 K/UL

## 2021-08-13 NOTE — RESULTS/DATA
[FreeTextEntry1] : \par  PET/CT FDG Skull to Thigh ONC initial Treatment Strategy             Final\par \par No Documents Attached\par \par EXAM:  PETCT SKUL-THI ONC FDG INIT\par \par \par PROCEDURE DATE:  05/10/2021\par \par INTERPRETATION:  FDG PET CT STUDY   initial  treatment strategy\par REASON: TUMOR IMAGING - PET with concurrently acquired CT for attenuation correction and anatomic localization; skull base to mid - thigh .\par \par CPT code 12856.\par \par Fasting blood glucose level:     126 mg/dl\par \par HISTORY: Chest CT on 4/29/2021 revealed a 4 cm right paratracheal soft tissue mass and lobulated solid 2.7 cm right upper lobe nodule suspicious for primary lung malignancy. 5 mm solid left upper lobe nodule. PET/CT ordered for staging purposes.\par \par TECHNIQUE: Approximately 45 minutes after the intravenous administration of 11.2 mCi 18-Fluorine FDG, whole body PET images were acquired from base of skull to mid - thigh.\par \par CT protocol used for this PET/CT study is designed for attenuation correction and anatomic localization of PET findings.  This  CT is not designed to replace state-of-the-art diagnostic CT scans for specific imaging protocols of different body parts.\par \par COMPARISON : None. Initial PET/CT. Correlation made with chest CT on 4/29/2021.\par \par FINDINGS:\par \par Head/Neck: No biologically active head/neck lesions.\par Normal uptake within the brain, pharyngeal lymphoid tissue, salivary glands and laryngeal musculature is noted.\par \par Thorax:\par FDG avid right upper lobe spiculated nodule, SUV max 23.7.\par Posterior left upper lobe 5 mm solid nodule is not FDG avid.\par \par FDG avid right lower paratracheal lymph node, SUV max 17.0.\par FDG avid right hilar lymph node, SUV max 15.5.\par \par Borderline FDG avid right axillary lymph node, SUV max 4.1 (correlate for site of Covid injection if recently vaccinated).\par \par Abdomen/Pelvis: Physiologic GI/  activity. No abnormal visceral or hetal tracer uptake is present.\par \par Musculoskeletal :  No suspicious hypermetabolic osseous lesions.\par \par Additional CT findings:\par Atrophic right kidney.\par Mild hepatic steatosis.\par \par IMPRESSION:\par \par FDG avid right upper lobe nodule, SUV max 23.7 compatible with biologic tumor activity. Ipsilateral FDG avid lymphadenopathy up to 17.0 compatible with hetal metastases.\par \par Borderline FDG avid right axillary lymph node, SUV max 4.1 (correlate for site of Covid injection if recently vaccinated).\par \par No other sites of pathologic FDG uptake.\par \par Posterior left upper lobe 5 mm solid nodule is not FDG avid.\par \par CARISA BILL MD; Attending Radiologist\par This document has been electronically signed. May 10 2021 10:02AM\par \par Ordered by: RAFAEL FIGUEROA       Collected/Examined: 10May2021 09:59AM       \par Verified by: RAFAEL FIGUEROA 12May2021 03:54PM       \par  Result Communication: No patient communication needed at this time;\par Stage: Final       \par  Performed at: [facility]       Resulted: 10May2021 09:46AM       Last Updated: 12May2021 03:54PM       Accession: [id number]

## 2021-08-13 NOTE — HISTORY OF PRESENT ILLNESS
[de-identified] : Mr. Vasquez is a 76 y/o M, current smoker, PMH of DM, HTN, HLD and AS, + smoker presenting for initial evaluation today for newly diagnosed NSCLC, adenocarcinoma. He presents with his son Primo.\par \par His history dates back to late April 2021 when he presented to the ED for shortness of breath after having a positive stress test as an outpatient. He reported that he could walk less than 1 block before becoming SOB which has progressively worsened. He denies any palpitations or chest pain/dizziness/syncope. He underwent cardiac catheterization and was found to have multivessel disease. He was seen by CT surgery while inpatient for  AVR/CABG. Preop CT Chest showed a lobulated solid 2.7 cm RUL nodule, and a 5 mm ANIA nodule, and a 4.0 x 2.9 cm right paratracheal soft tissue lesion. S/P Bronch/EBUS 4/30/2021- Prelim Bronch/EBUS path showed carcinoma. Brain MRI negative for metastatic disease. He was discharged with outpatient follow up with oncology and cardiology. \par \par He was subsequently discharged and underwent a PET/CT as outpatient which showed FDG avid right upper lobe nodule, SUV max 23.7 compatible with biologic tumor activity. Ipsilateral FDG avid lymphadenopathy up to 17.0 compatible with hetal metastases.\par \par Final pathology shows Non-small cell carcinoma, favor adenocarcinoma. Immunohistochemical stains are performed and show the tumor is\par positive for CK7, negative for CK20, TTF-1, napsin, CDX2, PAX8; findings are compatible with primary lung adenocarcinoma\par \par \par \par  [de-identified] : 5/19/2021: Today, he feels well. He denies any chest pain, coughing, wheezing, or abdominal pain. He is still actively smoking. He finds it very difficult to stop. He smokes about 1 PPD for at least 60 years. \par 6/24/21:\par  Mr. CASTANEDA came for a follow up visit for  NSCLC, lung adenocarcinoma. He completed 15 RT treatment and he is due for the 3rd cycle of Chemo. He reports  feels well. He denies any chest pain, coughing, wheezing,  abdominal pain.  dysphagia. Patient tolerating diet well. \par CBC , CMP reviewed with acceptable results. \par  He is still actively smoking. He finds it very difficult to stop. He smokes about 1 PPD for at least 60 years.\par 7/15/21;\par  Mr. CASTANEDA came for a follow up visit for  NSCLC, lung adenocarcinoma. He completed 22 RT treatment and he is due for the 6th cycle of Carboplatin and taxol. He reports  feels well.  But he reports SOB on exertion and occasionally coughing.  He is still actively smoking about 15 cigarette per day. \par  He denies any chest pain,  abdominal pain.  dysphagia. Patient tolerating diet well. \par CBC , CMP reviewed with acceptable results. Pulse Ox is 98% on RA.\par   He finds it very difficult to stop. He smokes about 1 PPD for at least 60 years.\par We explain to monitor for Sever coughing or CP. he needs to report to ER in case if any worsen symptoms.

## 2021-08-13 NOTE — CONSULT LETTER
[Dear  ___] : Dear  [unfilled], [Consult Letter:] : I had the pleasure of evaluating your patient, [unfilled]. [( Thank you for referring [unfilled] for consultation for _____ )] : Thank you for referring [unfilled] for consultation for [unfilled] [Please see my note below.] : Please see my note below. [Consult Closing:] : Thank you very much for allowing me to participate in the care of this patient.  If you have any questions, please do not hesitate to contact me. [Sincerely,] : Sincerely, [DrKarie  ___] : Dr. PRADO [DrKarie ___] : Dr. PRADO [FreeTextEntry3] : Janei Kirk MD

## 2021-08-13 NOTE — ASSESSMENT
[FreeTextEntry1] : #) NSCLC, lung adenocarcinoma, PDL1 0%, NGS pending, clinical stage IIIA with N2 nodes \par -- CT chest on 4/29/21 Lobulated solid 2.7 cm right upper lobe nodule, suspicious for primary lung neoplasia with 5 mm solid nodule posterior left upper lobe. Approximate 4 x 2.9 cm right paratracheal soft tissue lesion \par --MRI brain 4/30/2021, negative for metastasis \par --PET/CT 5/10/2021 FDG avid right upper lobe nodule, SUV max 23.7 compatible with biologic tumor activity. Ipsilateral FDG avid lymphadenopathy up to 17.0 compatible with hetal metastases. Borderline FDG avid right axillary lymph node, SUV max 4.1. Posterior left upper lobe 5 mm solid nodule is not FDG avid.\par -- We discussed treatment options with the patient. At this time, he has clincal stage [id number] which is Stage IIIA disease. We discussed that treatment is curative in nature and consists of ChemoRT. \par --He is to see Dr. Frey for RT to the chest and plan would be to dose weekly Carbo/Taxol with RT. \par --Potential side effects were discussed with the patient including but not limited to nausea, vomiting, hair loss, risk of infection and cytopenias .\par On 6/24/21:\par patient will proceed with Cycle #3 of carboplatin and Taxol.\par He still have 3 more weeks of RT.\par 7/15/21:\par patient will proceed with Cycle # 6 of Carboplatin and taxol. \par Continue with RT as per schedule.\par \par #) Triple Vessel Disease \par -- He will need to see cardiology prior to treatment to decide on need for intervention for his known CAD, he may require stent rather than CABG \par --Case was discussed with Dr. Ramírez who advises to proceed with cancer directed therapy prior to stent consideration\par \par RTC in 4 weeks \par Patient seen and examined by Dr. Avila who agreed for the above plan of care.

## 2021-08-14 NOTE — ASSESSMENT
[FreeTextEntry1] : 76 year old male with NSCLC stage 3 s/p chemoradiation .\par Weight loss , chemo induced anemia . weakness, exertional dyspnea. low mg .\par Plan :  check ferritin , B12 . \par            marinol 2.5 bid \par           follow up in 2 weeks , consider maintenance durvalumab . \par           follow up with cardiology for CAD , need for angioplasty ?

## 2021-08-14 NOTE — HISTORY OF PRESENT ILLNESS
[de-identified] : \par Mr. Vasquez is a 74 y/o M, current smoker, PMH of DM, HTN, HLD and AS, + smoker presenting for initial evaluation today for newly diagnosed NSCLC, adenocarcinoma. He presents with his son Primo.\par \par His history dates back to late April 2021 when he presented to the ED for shortness of breath after having a positive stress test as an outpatient. He reported that he could walk less than 1 block before becoming SOB which has progressively worsened. He denies any palpitations or chest pain/dizziness/syncope. He underwent cardiac catheterization and was found to have multivessel disease. He was seen by CT surgery while inpatient for AVR/CABG. Preop CT Chest showed a lobulated solid 2.7 cm RUL nodule, and a 5 mm ANIA nodule, and a 4.0 x 2.9 cm right paratracheal soft tissue lesion. S/P Bronch/EBUS 4/30/2021- Prelim Bronch/EBUS path showed carcinoma. Brain MRI negative for metastatic disease. He was discharged with outpatient follow up with oncology and cardiology. \par \par He was subsequently discharged and underwent a PET/CT as outpatient which showed FDG avid right upper lobe nodule, SUV max 23.7 compatible with biologic tumor activity. Ipsilateral FDG avid lymphadenopathy up to 17.0 compatible with hetal metastases.\par \par Final pathology shows Non-small cell carcinoma, favor adenocarcinoma. Immunohistochemical stains are performed and show the tumor is\par positive for CK7, negative for CK20, TTF-1, napsin, CDX2, PAX8; findings are compatible with primary lung adenocarcinoma\par \par  [de-identified] : terval History: 5/19/2021: Today, he feels well. He denies any chest pain, coughing, wheezing, or abdominal pain. He is still actively smoking. He finds it very difficult to stop. He smokes about 1 PPD for at least 60 years. \par 6/24/21:\par  Mr. CASTANEDA came for a follow up visit for NSCLC, lung adenocarcinoma. He completed 15 RT treatment and he is due for the 3rd cycle of Chemo. He reports feels well. He denies any chest pain, coughing, wheezing, abdominal pain. dysphagia. Patient tolerating diet well. \par CBC , CMP reviewed with acceptable results. \par  He is still actively smoking. He finds it very difficult to stop. He smokes about 1 PPD for at least 60 years.\par 7/15/21;\par  Mr. CASTANEDA came for a follow up visit for NSCLC, lung adenocarcinoma. He completed 22 RT treatment and he is due for the 6th cycle of Carboplatin and taxol. He reports feels well. But he reports SOB on exertion and occasionally coughing. He is still actively smoking about 15 cigarette per day. \par  He denies any chest pain, abdominal pain. dysphagia. Patient tolerating diet well. \par CBC , CMP reviewed with acceptable results. Pulse Ox is 98% on RA.\par  He finds it very difficult to stop. He smokes about 1 PPD for at least 60 years.\par We explain to monitor for Sever coughing or CP. he needs to report to ER in case if any worsen symptoms. \par \par 08/12/2021 patient returns after completion of chemoradiation for stage 3 NSCLC , he complains of weakness, cough , decreased appetite and abnormal taste . he has dyspnea on mild exertion . he is receiving IV magnesium for low mg . He lost few lbs so far. Hgb is down to 9.7 from 12 in one month , no hematochezia. \par

## 2021-08-14 NOTE — PHYSICAL EXAM
[Normal] : RRR, normal S1S2, no murmurs, rubs, gallops [de-identified] : pale in no acute distress.  [de-identified] : no edema.

## 2021-08-26 ENCOUNTER — TRANSCRIPTION ENCOUNTER (OUTPATIENT)
Age: 76
End: 2021-08-26

## 2021-08-26 ENCOUNTER — LABORATORY RESULT (OUTPATIENT)
Age: 76
End: 2021-08-26

## 2021-08-26 ENCOUNTER — APPOINTMENT (OUTPATIENT)
Dept: RADIATION ONCOLOGY | Facility: HOSPITAL | Age: 76
End: 2021-08-26
Payer: MEDICARE

## 2021-08-26 ENCOUNTER — OUTPATIENT (OUTPATIENT)
Dept: OUTPATIENT SERVICES | Facility: HOSPITAL | Age: 76
LOS: 1 days | Discharge: HOME | End: 2021-08-26

## 2021-08-26 ENCOUNTER — APPOINTMENT (OUTPATIENT)
Dept: HEMATOLOGY ONCOLOGY | Facility: CLINIC | Age: 76
End: 2021-08-26
Payer: MEDICARE

## 2021-08-26 VITALS
WEIGHT: 197 LBS | RESPIRATION RATE: 16 BRPM | DIASTOLIC BLOOD PRESSURE: 69 MMHG | TEMPERATURE: 97.1 F | SYSTOLIC BLOOD PRESSURE: 128 MMHG | BODY MASS INDEX: 27.58 KG/M2 | HEIGHT: 71 IN | HEART RATE: 97 BPM

## 2021-08-26 VITALS
DIASTOLIC BLOOD PRESSURE: 56 MMHG | OXYGEN SATURATION: 98 % | BODY MASS INDEX: 27.28 KG/M2 | SYSTOLIC BLOOD PRESSURE: 116 MMHG | WEIGHT: 195.6 LBS | TEMPERATURE: 96.6 F | HEART RATE: 100 BPM | RESPIRATION RATE: 16 BRPM

## 2021-08-26 PROCEDURE — 99213 OFFICE O/P EST LOW 20 MIN: CPT

## 2021-08-26 PROCEDURE — 99024 POSTOP FOLLOW-UP VISIT: CPT

## 2021-08-26 RX ORDER — DEXAMETHASONE 4 MG/1
4 TABLET ORAL
Qty: 48 | Refills: 6 | Status: DISCONTINUED | COMMUNITY
Start: 2021-05-27 | End: 2021-08-26

## 2021-08-26 NOTE — PHYSICAL EXAM
[Normal] : oriented to person, place and time, the affect was normal, the mood was normal and not anxious [de-identified] : he has widespread wheezes in all lung fields.   [de-identified] : systolic murmur

## 2021-08-28 LAB
ALBUMIN SERPL ELPH-MCNC: 4 G/DL
ALP BLD-CCNC: 91 U/L
ALT SERPL-CCNC: 15 U/L
ANION GAP SERPL CALC-SCNC: 15 MMOL/L
AST SERPL-CCNC: 14 U/L
BILIRUB SERPL-MCNC: 0.3 MG/DL
BUN SERPL-MCNC: 28 MG/DL
CALCIUM SERPL-MCNC: 9.8 MG/DL
CEA SERPL-MCNC: 2.4 NG/ML
CHLORIDE SERPL-SCNC: 100 MMOL/L
CO2 SERPL-SCNC: 21 MMOL/L
CREAT SERPL-MCNC: 1.3 MG/DL
FERRITIN SERPL-MCNC: 573 NG/ML
GLUCOSE SERPL-MCNC: 96 MG/DL
HBV CORE IGG+IGM SER QL: NONREACTIVE
HBV SURFACE AB SER QL: NONREACTIVE
HBV SURFACE AG SER QL: NONREACTIVE
HCT VFR BLD CALC: 34.4 %
HGB BLD-MCNC: 11.1 G/DL
MAGNESIUM SERPL-MCNC: 1.5 MG/DL
MCHC RBC-ENTMCNC: 29.9 PG
MCHC RBC-ENTMCNC: 32.3 G/DL
MCV RBC AUTO: 92.7 FL
PLATELET # BLD AUTO: 434 K/UL
PMV BLD: 9.3 FL
POTASSIUM SERPL-SCNC: 5.2 MMOL/L
PROT SERPL-MCNC: 7 G/DL
RBC # BLD: 3.71 M/UL
RBC # FLD: 15.6 %
RETICS # AUTO: 3.4 %
RETICS AGGREG/RBC NFR: 125 K/UL
SODIUM SERPL-SCNC: 136 MMOL/L
TSH SERPL-ACNC: 6.23 UIU/ML
VIT B12 SERPL-MCNC: 454 PG/ML
WBC # FLD AUTO: 14.79 K/UL

## 2021-08-29 LAB — HCV RNA FLD QL NAA+PROBE: NEGATIVE

## 2021-09-01 NOTE — PHYSICAL EXAM
[Normal] : RRR, normal S1S2, no murmurs, rubs, gallops [de-identified] : no edema.  [de-identified] : pale in no acute distress.

## 2021-09-01 NOTE — HISTORY OF PRESENT ILLNESS
[de-identified] : terval History: 5/19/2021: Today, he feels well. He denies any chest pain, coughing, wheezing, or abdominal pain. He is still actively smoking. He finds it very difficult to stop. He smokes about 1 PPD for at least 60 years. \par 6/24/21:\par  Mr. CASTANEDA came for a follow up visit for NSCLC, lung adenocarcinoma. He completed 15 RT treatment and he is due for the 3rd cycle of Chemo. He reports feels well. He denies any chest pain, coughing, wheezing, abdominal pain. dysphagia. Patient tolerating diet well. \par CBC , CMP reviewed with acceptable results. \par  He is still actively smoking. He finds it very difficult to stop. He smokes about 1 PPD for at least 60 years.\par 7/15/21;\par  Mr. CASTANEDA came for a follow up visit for NSCLC, lung adenocarcinoma. He completed 22 RT treatment and he is due for the 6th cycle of Carboplatin and taxol. He reports feels well. But he reports SOB on exertion and occasionally coughing. He is still actively smoking about 15 cigarette per day. \par  He denies any chest pain, abdominal pain. dysphagia. Patient tolerating diet well. \par CBC , CMP reviewed with acceptable results. Pulse Ox is 98% on RA.\par  He finds it very difficult to stop. He smokes about 1 PPD for at least 60 years.\par We explain to monitor for Sever coughing or CP. he needs to report to ER in case if any worsen symptoms. \par \par 08/12/2021 patient returns after completion of chemoradiation for stage 3 NSCLC , he complains of weakness, cough , decreased appetite and abnormal taste . he has dyspnea on mild exertion . he is receiving IV magnesium for low mg . He lost few lbs so far. Hgb is down to 9.7 from 12 in one month , no hematochezia. \par \par 08/26/2021 Patient returns for follow up with more weight loss . His breathing is stable , no cough . no fever .  [de-identified] : \par Mr. Vasquez is a 76 y/o M, current smoker, PMH of DM, HTN, HLD and AS, + smoker presenting for initial evaluation today for newly diagnosed NSCLC, adenocarcinoma. He presents with his son Primo.\par \par His history dates back to late April 2021 when he presented to the ED for shortness of breath after having a positive stress test as an outpatient. He reported that he could walk less than 1 block before becoming SOB which has progressively worsened. He denies any palpitations or chest pain/dizziness/syncope. He underwent cardiac catheterization and was found to have multivessel disease. He was seen by CT surgery while inpatient for AVR/CABG. Preop CT Chest showed a lobulated solid 2.7 cm RUL nodule, and a 5 mm ANIA nodule, and a 4.0 x 2.9 cm right paratracheal soft tissue lesion. S/P Bronch/EBUS 4/30/2021- Prelim Bronch/EBUS path showed carcinoma. Brain MRI negative for metastatic disease. He was discharged with outpatient follow up with oncology and cardiology. \par \par He was subsequently discharged and underwent a PET/CT as outpatient which showed FDG avid right upper lobe nodule, SUV max 23.7 compatible with biologic tumor activity. Ipsilateral FDG avid lymphadenopathy up to 17.0 compatible with hetal metastases.\par \par Final pathology shows Non-small cell carcinoma, favor adenocarcinoma. Immunohistochemical stains are performed and show the tumor is\par positive for CK7, negative for CK20, TTF-1, napsin, CDX2, PAX8; findings are compatible with primary lung adenocarcinoma\par \par

## 2021-09-01 NOTE — ASSESSMENT
[FreeTextEntry1] : 76 year old male with NSCLC stage 3 s/p chemoradiation .\par Weight loss , chemo induced anemia improved.  weakness .\par Plan :  recommend medical marijuana .\par           will start durvalumab in 1 week . \par          repeat imaging in one month . rule out progression .

## 2021-09-02 DIAGNOSIS — C34.90 MALIGNANT NEOPLASM OF UNSPECIFIED PART OF UNSPECIFIED BRONCHUS OR LUNG: ICD-10-CM

## 2021-11-23 ENCOUNTER — FORM ENCOUNTER (OUTPATIENT)
Age: 76
End: 2021-11-23

## 2021-11-24 ENCOUNTER — TRANSCRIPTION ENCOUNTER (OUTPATIENT)
Age: 76
End: 2021-11-24

## 2022-01-01 ENCOUNTER — TRANSCRIPTION ENCOUNTER (OUTPATIENT)
Age: 77
End: 2022-01-01

## 2022-01-01 ENCOUNTER — APPOINTMENT (OUTPATIENT)
Dept: HEMATOLOGY ONCOLOGY | Facility: CLINIC | Age: 77
End: 2022-01-01
Payer: MEDICARE

## 2022-01-01 ENCOUNTER — OUTPATIENT (OUTPATIENT)
Dept: OUTPATIENT SERVICES | Facility: HOSPITAL | Age: 77
LOS: 1 days | Discharge: HOME | End: 2022-01-01

## 2022-01-01 ENCOUNTER — LABORATORY RESULT (OUTPATIENT)
Age: 77
End: 2022-01-01

## 2022-01-01 ENCOUNTER — APPOINTMENT (OUTPATIENT)
Dept: HEMATOLOGY ONCOLOGY | Facility: CLINIC | Age: 77
End: 2022-01-01

## 2022-01-01 ENCOUNTER — APPOINTMENT (OUTPATIENT)
Dept: INFUSION THERAPY | Facility: CLINIC | Age: 77
End: 2022-01-01

## 2022-01-01 ENCOUNTER — RESULT REVIEW (OUTPATIENT)
Age: 77
End: 2022-01-01

## 2022-01-01 ENCOUNTER — NON-APPOINTMENT (OUTPATIENT)
Age: 77
End: 2022-01-01

## 2022-01-01 ENCOUNTER — APPOINTMENT (OUTPATIENT)
Dept: INFUSION THERAPY | Facility: CLINIC | Age: 77
End: 2022-01-01
Payer: MEDICARE

## 2022-01-01 ENCOUNTER — OUTPATIENT (OUTPATIENT)
Dept: OUTPATIENT SERVICES | Facility: HOSPITAL | Age: 77
LOS: 1 days | Discharge: HOME | End: 2022-01-01
Payer: MEDICARE

## 2022-01-01 ENCOUNTER — INPATIENT (INPATIENT)
Facility: HOSPITAL | Age: 77
LOS: 5 days | Discharge: HOME HEALTH | End: 2022-06-11
Payer: MEDICARE

## 2022-01-01 ENCOUNTER — INPATIENT (INPATIENT)
Facility: HOSPITAL | Age: 77
LOS: 2 days | Discharge: HOME | End: 2022-08-31
Attending: INTERNAL MEDICINE | Admitting: INTERNAL MEDICINE

## 2022-01-01 ENCOUNTER — OUTPATIENT (OUTPATIENT)
Dept: OUTPATIENT SERVICES | Facility: HOSPITAL | Age: 77
LOS: 1 days | End: 2022-01-01

## 2022-01-01 ENCOUNTER — INPATIENT (INPATIENT)
Facility: HOSPITAL | Age: 77
LOS: 0 days | Discharge: HOME | End: 2022-12-20
Attending: HOSPITALIST | Admitting: HOSPITALIST
Payer: MEDICARE

## 2022-01-01 VITALS
SYSTOLIC BLOOD PRESSURE: 112 MMHG | WEIGHT: 169 LBS | BODY MASS INDEX: 25.48 KG/M2 | DIASTOLIC BLOOD PRESSURE: 66 MMHG | RESPIRATION RATE: 20 BRPM | DIASTOLIC BLOOD PRESSURE: 53 MMHG | TEMPERATURE: 97.7 F | HEIGHT: 71 IN | HEART RATE: 114 BPM | BODY MASS INDEX: 23.66 KG/M2 | WEIGHT: 182 LBS | OXYGEN SATURATION: 100 % | TEMPERATURE: 97.2 F | HEIGHT: 71 IN | SYSTOLIC BLOOD PRESSURE: 135 MMHG | HEART RATE: 116 BPM | OXYGEN SATURATION: 100 %

## 2022-01-01 VITALS
TEMPERATURE: 97.7 F | BODY MASS INDEX: 24.36 KG/M2 | OXYGEN SATURATION: 100 % | HEIGHT: 71 IN | RESPIRATION RATE: 20 BRPM | SYSTOLIC BLOOD PRESSURE: 114 MMHG | DIASTOLIC BLOOD PRESSURE: 61 MMHG | WEIGHT: 174 LBS | HEART RATE: 94 BPM

## 2022-01-01 VITALS
SYSTOLIC BLOOD PRESSURE: 153 MMHG | HEART RATE: 84 BPM | RESPIRATION RATE: 16 BRPM | HEIGHT: 71 IN | OXYGEN SATURATION: 98 % | WEIGHT: 204 LBS | BODY MASS INDEX: 28.56 KG/M2 | TEMPERATURE: 97.5 F | DIASTOLIC BLOOD PRESSURE: 67 MMHG

## 2022-01-01 VITALS
HEART RATE: 109 BPM | SYSTOLIC BLOOD PRESSURE: 116 MMHG | OXYGEN SATURATION: 99 % | TEMPERATURE: 99 F | WEIGHT: 166.89 LBS | DIASTOLIC BLOOD PRESSURE: 56 MMHG | RESPIRATION RATE: 17 BRPM

## 2022-01-01 VITALS
DIASTOLIC BLOOD PRESSURE: 60 MMHG | TEMPERATURE: 99 F | RESPIRATION RATE: 18 BRPM | HEART RATE: 62 BPM | SYSTOLIC BLOOD PRESSURE: 106 MMHG

## 2022-01-01 VITALS
HEIGHT: 71 IN | TEMPERATURE: 97.2 F | WEIGHT: 204 LBS | BODY MASS INDEX: 28.56 KG/M2 | RESPIRATION RATE: 16 BRPM | OXYGEN SATURATION: 98 % | HEART RATE: 101 BPM | SYSTOLIC BLOOD PRESSURE: 144 MMHG | DIASTOLIC BLOOD PRESSURE: 78 MMHG

## 2022-01-01 VITALS
SYSTOLIC BLOOD PRESSURE: 133 MMHG | OXYGEN SATURATION: 99 % | HEIGHT: 71 IN | RESPIRATION RATE: 20 BRPM | TEMPERATURE: 102 F | DIASTOLIC BLOOD PRESSURE: 63 MMHG | HEART RATE: 115 BPM

## 2022-01-01 VITALS
WEIGHT: 200 LBS | BODY MASS INDEX: 28 KG/M2 | DIASTOLIC BLOOD PRESSURE: 64 MMHG | TEMPERATURE: 97.1 F | RESPIRATION RATE: 16 BRPM | OXYGEN SATURATION: 98 % | HEIGHT: 71 IN | SYSTOLIC BLOOD PRESSURE: 138 MMHG | HEART RATE: 58 BPM

## 2022-01-01 VITALS
TEMPERATURE: 97.2 F | SYSTOLIC BLOOD PRESSURE: 111 MMHG | BODY MASS INDEX: 27.3 KG/M2 | WEIGHT: 195 LBS | DIASTOLIC BLOOD PRESSURE: 62 MMHG | HEIGHT: 71 IN | HEART RATE: 105 BPM

## 2022-01-01 VITALS
SYSTOLIC BLOOD PRESSURE: 147 MMHG | HEART RATE: 97 BPM | DIASTOLIC BLOOD PRESSURE: 72 MMHG | RESPIRATION RATE: 16 BRPM | TEMPERATURE: 97.1 F | BODY MASS INDEX: 29.12 KG/M2 | OXYGEN SATURATION: 98 % | WEIGHT: 208 LBS | HEIGHT: 71 IN

## 2022-01-01 VITALS
HEART RATE: 83 BPM | SYSTOLIC BLOOD PRESSURE: 119 MMHG | BODY MASS INDEX: 28.56 KG/M2 | TEMPERATURE: 98 F | RESPIRATION RATE: 20 BRPM | HEIGHT: 71 IN | DIASTOLIC BLOOD PRESSURE: 69 MMHG | WEIGHT: 204 LBS

## 2022-01-01 VITALS
WEIGHT: 189 LBS | HEIGHT: 71 IN | DIASTOLIC BLOOD PRESSURE: 62 MMHG | TEMPERATURE: 97.6 F | SYSTOLIC BLOOD PRESSURE: 122 MMHG | HEART RATE: 85 BPM | BODY MASS INDEX: 26.46 KG/M2

## 2022-01-01 VITALS
SYSTOLIC BLOOD PRESSURE: 119 MMHG | TEMPERATURE: 97 F | HEIGHT: 71 IN | RESPIRATION RATE: 18 BRPM | DIASTOLIC BLOOD PRESSURE: 65 MMHG | OXYGEN SATURATION: 98 % | HEART RATE: 96 BPM

## 2022-01-01 VITALS
HEIGHT: 71 IN | RESPIRATION RATE: 14 BRPM | SYSTOLIC BLOOD PRESSURE: 151 MMHG | WEIGHT: 208 LBS | TEMPERATURE: 97.5 F | DIASTOLIC BLOOD PRESSURE: 68 MMHG | HEART RATE: 81 BPM | BODY MASS INDEX: 29.12 KG/M2

## 2022-01-01 VITALS
RESPIRATION RATE: 18 BRPM | HEART RATE: 58 BPM | SYSTOLIC BLOOD PRESSURE: 127 MMHG | DIASTOLIC BLOOD PRESSURE: 58 MMHG | TEMPERATURE: 97.9 F

## 2022-01-01 VITALS — SYSTOLIC BLOOD PRESSURE: 107 MMHG | DIASTOLIC BLOOD PRESSURE: 51 MMHG | HEART RATE: 104 BPM

## 2022-01-01 VITALS — OXYGEN SATURATION: 97 %

## 2022-01-01 DIAGNOSIS — C78.6 SECONDARY MALIGNANT NEOPLASM OF RETROPERITONEUM AND PERITONEUM: ICD-10-CM

## 2022-01-01 DIAGNOSIS — D64.81 ANEMIA DUE TO ANTINEOPLASTIC CHEMOTHERAPY: ICD-10-CM

## 2022-01-01 DIAGNOSIS — S02.2XXB FRACTURE OF NASAL BONES, INITIAL ENCOUNTER FOR OPEN FRACTURE: ICD-10-CM

## 2022-01-01 DIAGNOSIS — C34.10 MALIGNANT NEOPLASM OF UPPER LOBE, UNSPECIFIED BRONCHUS OR LUNG: ICD-10-CM

## 2022-01-01 DIAGNOSIS — C34.90 MALIGNANT NEOPLASM OF UNSPECIFIED PART OF UNSPECIFIED BRONCHUS OR LUNG: ICD-10-CM

## 2022-01-01 DIAGNOSIS — I11.0 HYPERTENSIVE HEART DISEASE WITH HEART FAILURE: ICD-10-CM

## 2022-01-01 DIAGNOSIS — R91.1 SOLITARY PULMONARY NODULE: ICD-10-CM

## 2022-01-01 DIAGNOSIS — E11.65 TYPE 2 DIABETES MELLITUS WITH HYPERGLYCEMIA: ICD-10-CM

## 2022-01-01 DIAGNOSIS — D64.9 ANEMIA, UNSPECIFIED: ICD-10-CM

## 2022-01-01 DIAGNOSIS — I12.9 HYPERTENSIVE CHRONIC KIDNEY DISEASE WITH STAGE 1 THROUGH STAGE 4 CHRONIC KIDNEY DISEASE, OR UNSPECIFIED CHRONIC KIDNEY DISEASE: ICD-10-CM

## 2022-01-01 DIAGNOSIS — R59.0 LOCALIZED ENLARGED LYMPH NODES: ICD-10-CM

## 2022-01-01 DIAGNOSIS — K59.00 CONSTIPATION, UNSPECIFIED: ICD-10-CM

## 2022-01-01 DIAGNOSIS — Z51.11 ENCOUNTER FOR ANTINEOPLASTIC CHEMOTHERAPY: ICD-10-CM

## 2022-01-01 DIAGNOSIS — Z79.84 LONG TERM (CURRENT) USE OF ORAL HYPOGLYCEMIC DRUGS: ICD-10-CM

## 2022-01-01 DIAGNOSIS — C78.7 SECONDARY MALIGNANT NEOPLASM OF LIVER AND INTRAHEPATIC BILE DUCT: ICD-10-CM
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
## 2022-01-01 DIAGNOSIS — Z51.5 ENCOUNTER FOR PALLIATIVE CARE: ICD-10-CM

## 2022-01-01 DIAGNOSIS — F17.210 NICOTINE DEPENDENCE, CIGARETTES, UNCOMPLICATED: ICD-10-CM

## 2022-01-01 DIAGNOSIS — A41.9 SEPSIS, UNSPECIFIED ORGANISM: ICD-10-CM

## 2022-01-01 DIAGNOSIS — Z79.82 LONG TERM (CURRENT) USE OF ASPIRIN: ICD-10-CM

## 2022-01-01 DIAGNOSIS — E78.5 HYPERLIPIDEMIA, UNSPECIFIED: ICD-10-CM

## 2022-01-01 DIAGNOSIS — Z85.79 PERSONAL HISTORY OF OTHER MALIGNANT NEOPLASMS OF LYMPHOID, HEMATOPOIETIC AND RELATED TISSUES: ICD-10-CM

## 2022-01-01 DIAGNOSIS — E87.1 HYPO-OSMOLALITY AND HYPONATREMIA: ICD-10-CM

## 2022-01-01 DIAGNOSIS — N17.9 ACUTE KIDNEY FAILURE, UNSPECIFIED: ICD-10-CM

## 2022-01-01 DIAGNOSIS — J15.6 PNEUMONIA DUE TO OTHER GRAM-NEGATIVE BACTERIA: ICD-10-CM

## 2022-01-01 DIAGNOSIS — N18.31 CHRONIC KIDNEY DISEASE, STAGE 3A: ICD-10-CM

## 2022-01-01 DIAGNOSIS — D70.9 NEUTROPENIA, UNSPECIFIED: ICD-10-CM

## 2022-01-01 DIAGNOSIS — D63.8 ANEMIA IN OTHER CHRONIC DISEASES CLASSIFIED ELSEWHERE: ICD-10-CM

## 2022-01-01 DIAGNOSIS — E87.5 HYPERKALEMIA: ICD-10-CM

## 2022-01-01 DIAGNOSIS — R29.6 REPEATED FALLS: ICD-10-CM

## 2022-01-01 DIAGNOSIS — E11.9 TYPE 2 DIABETES MELLITUS WITHOUT COMPLICATIONS: ICD-10-CM

## 2022-01-01 DIAGNOSIS — Z71.89 OTHER SPECIFIED COUNSELING: ICD-10-CM

## 2022-01-01 DIAGNOSIS — C79.52 SECONDARY MALIGNANT NEOPLASM OF BONE MARROW: ICD-10-CM

## 2022-01-01 DIAGNOSIS — E11.22 TYPE 2 DIABETES MELLITUS WITH DIABETIC CHRONIC KIDNEY DISEASE: ICD-10-CM

## 2022-01-01 DIAGNOSIS — Z79.899 OTHER LONG TERM (CURRENT) DRUG THERAPY: ICD-10-CM
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
## 2022-01-01 DIAGNOSIS — Z71.6 TOBACCO ABUSE COUNSELING: ICD-10-CM

## 2022-01-01 DIAGNOSIS — R42 DIZZINESS AND GIDDINESS: ICD-10-CM

## 2022-01-01 DIAGNOSIS — R52 PAIN, UNSPECIFIED: ICD-10-CM

## 2022-01-01 DIAGNOSIS — T38.0X5A ADVERSE EFFECT OF GLUCOCORTICOIDS AND SYNTHETIC ANALOGUES, INITIAL ENCOUNTER: ICD-10-CM

## 2022-01-01 DIAGNOSIS — D72.829 ELEVATED WHITE BLOOD CELL COUNT, UNSPECIFIED: ICD-10-CM

## 2022-01-01 DIAGNOSIS — R05.3 CHRONIC COUGH: ICD-10-CM

## 2022-01-01 DIAGNOSIS — R10.9 UNSPECIFIED ABDOMINAL PAIN: ICD-10-CM
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
## 2022-01-01 DIAGNOSIS — D63.0 ANEMIA IN NEOPLASTIC DISEASE: ICD-10-CM

## 2022-01-01 DIAGNOSIS — C79.51 SECONDARY MALIGNANT NEOPLASM OF BONE: ICD-10-CM

## 2022-01-01 DIAGNOSIS — I10 ESSENTIAL (PRIMARY) HYPERTENSION: ICD-10-CM

## 2022-01-01 DIAGNOSIS — J18.9 PNEUMONIA, UNSPECIFIED ORGANISM: ICD-10-CM

## 2022-01-01 DIAGNOSIS — I95.1 ORTHOSTATIC HYPOTENSION: ICD-10-CM

## 2022-01-01 DIAGNOSIS — I50.32 CHRONIC DIASTOLIC (CONGESTIVE) HEART FAILURE: ICD-10-CM

## 2022-01-01 DIAGNOSIS — J69.0 PNEUMONITIS DUE TO INHALATION OF FOOD AND VOMIT: ICD-10-CM

## 2022-01-01 DIAGNOSIS — T45.1X5A ADVERSE EFFECT OF ANTINEOPLASTIC AND IMMUNOSUPPRESSIVE DRUGS, INITIAL ENCOUNTER: ICD-10-CM

## 2022-01-01 DIAGNOSIS — I25.10 ATHEROSCLEROTIC HEART DISEASE OF NATIVE CORONARY ARTERY WITHOUT ANGINA PECTORIS: ICD-10-CM

## 2022-01-01 LAB
A1C WITH ESTIMATED AVERAGE GLUCOSE RESULT: 6.1 % — HIGH (ref 4–5.6)
A1C WITH ESTIMATED AVERAGE GLUCOSE RESULT: 7 % — HIGH (ref 4–5.6)
A1C WITH ESTIMATED AVERAGE GLUCOSE RESULT: 8.2 % — HIGH (ref 4–5.6)
A1C WITH ESTIMATED AVERAGE GLUCOSE RESULT: 8.5 % — HIGH (ref 4–5.6)
ABO + RH PNL BLD: NORMAL
ALBUMIN SERPL ELPH-MCNC: 2.9 G/DL — LOW (ref 3.5–5.2)
ALBUMIN SERPL ELPH-MCNC: 2.9 G/DL — LOW (ref 3.5–5.2)
ALBUMIN SERPL ELPH-MCNC: 3 G/DL — LOW (ref 3.5–5.2)
ALBUMIN SERPL ELPH-MCNC: 3.1 G/DL — LOW (ref 3.5–5.2)
ALBUMIN SERPL ELPH-MCNC: 3.2 G/DL — LOW (ref 3.5–5.2)
ALBUMIN SERPL ELPH-MCNC: 3.4 G/DL — LOW (ref 3.5–5.2)
ALBUMIN SERPL ELPH-MCNC: 3.5 G/DL — SIGNIFICANT CHANGE UP (ref 3.5–5.2)
ALBUMIN SERPL ELPH-MCNC: 3.6 G/DL — SIGNIFICANT CHANGE UP (ref 3.5–5.2)
ALBUMIN SERPL ELPH-MCNC: 3.7 G/DL
ALBUMIN SERPL ELPH-MCNC: 3.7 G/DL
ALBUMIN SERPL ELPH-MCNC: 3.7 G/DL — SIGNIFICANT CHANGE UP (ref 3.5–5.2)
ALBUMIN SERPL ELPH-MCNC: 3.8 G/DL
ALBUMIN SERPL ELPH-MCNC: 3.8 G/DL
ALBUMIN SERPL ELPH-MCNC: 3.9 G/DL
ALBUMIN SERPL ELPH-MCNC: 3.9 G/DL
ALBUMIN SERPL ELPH-MCNC: 4 G/DL
ALBUMIN SERPL ELPH-MCNC: 4.1 G/DL
ALBUMIN SERPL ELPH-MCNC: 4.3 G/DL
ALBUMIN SERPL ELPH-MCNC: 4.3 G/DL — SIGNIFICANT CHANGE UP (ref 3.5–5.2)
ALP BLD-CCNC: 102 U/L
ALP BLD-CCNC: 102 U/L
ALP BLD-CCNC: 107 U/L
ALP BLD-CCNC: 122 U/L
ALP BLD-CCNC: 125 U/L
ALP BLD-CCNC: 133 U/L
ALP BLD-CCNC: 133 U/L
ALP BLD-CCNC: 151 U/L
ALP BLD-CCNC: 77 U/L
ALP BLD-CCNC: 79 U/L
ALP BLD-CCNC: 85 U/L
ALP SERPL-CCNC: 100 U/L — SIGNIFICANT CHANGE UP (ref 30–115)
ALP SERPL-CCNC: 103 U/L — SIGNIFICANT CHANGE UP (ref 30–115)
ALP SERPL-CCNC: 105 U/L — SIGNIFICANT CHANGE UP (ref 30–115)
ALP SERPL-CCNC: 109 U/L — SIGNIFICANT CHANGE UP (ref 30–115)
ALP SERPL-CCNC: 110 U/L — SIGNIFICANT CHANGE UP (ref 30–115)
ALP SERPL-CCNC: 116 U/L — HIGH (ref 30–115)
ALP SERPL-CCNC: 116 U/L — HIGH (ref 30–115)
ALP SERPL-CCNC: 706 U/L — HIGH (ref 30–115)
ALP SERPL-CCNC: 924 U/L — HIGH (ref 30–115)
ALP SERPL-CCNC: 98 U/L — SIGNIFICANT CHANGE UP (ref 30–115)
ALT FLD-CCNC: 12 U/L — SIGNIFICANT CHANGE UP (ref 0–41)
ALT FLD-CCNC: 15 U/L — SIGNIFICANT CHANGE UP (ref 0–41)
ALT FLD-CCNC: 17 U/L — SIGNIFICANT CHANGE UP (ref 0–41)
ALT FLD-CCNC: 18 U/L — SIGNIFICANT CHANGE UP (ref 0–41)
ALT FLD-CCNC: 19 U/L — SIGNIFICANT CHANGE UP (ref 0–41)
ALT FLD-CCNC: 29 U/L — SIGNIFICANT CHANGE UP (ref 0–41)
ALT FLD-CCNC: 31 U/L — SIGNIFICANT CHANGE UP (ref 0–41)
ALT FLD-CCNC: 40 U/L — SIGNIFICANT CHANGE UP (ref 0–41)
ALT FLD-CCNC: 41 U/L — SIGNIFICANT CHANGE UP (ref 0–41)
ALT FLD-CCNC: 51 U/L — HIGH (ref 0–41)
ALT FLD-CCNC: 56 U/L — HIGH (ref 0–41)
ALT FLD-CCNC: 65 U/L — HIGH (ref 0–41)
ALT SERPL-CCNC: 10 U/L
ALT SERPL-CCNC: 11 U/L
ALT SERPL-CCNC: 11 U/L
ALT SERPL-CCNC: 13 U/L
ALT SERPL-CCNC: 17 U/L
ALT SERPL-CCNC: 18 U/L
ALT SERPL-CCNC: 21 U/L
ALT SERPL-CCNC: 29 U/L
ALT SERPL-CCNC: 8 U/L
ALT SERPL-CCNC: 8 U/L
ALT SERPL-CCNC: 9 U/L
ANION GAP SERPL CALC-SCNC: 11 MMOL/L — SIGNIFICANT CHANGE UP (ref 7–14)
ANION GAP SERPL CALC-SCNC: 12 MMOL/L — SIGNIFICANT CHANGE UP (ref 7–14)
ANION GAP SERPL CALC-SCNC: 13 MMOL/L
ANION GAP SERPL CALC-SCNC: 14 MMOL/L
ANION GAP SERPL CALC-SCNC: 14 MMOL/L — SIGNIFICANT CHANGE UP (ref 7–14)
ANION GAP SERPL CALC-SCNC: 15 MMOL/L
ANION GAP SERPL CALC-SCNC: 15 MMOL/L — HIGH (ref 7–14)
ANION GAP SERPL CALC-SCNC: 16 MMOL/L — HIGH (ref 7–14)
ANION GAP SERPL CALC-SCNC: 17 MMOL/L
ANION GAP SERPL CALC-SCNC: 17 MMOL/L
ANION GAP SERPL CALC-SCNC: 17 MMOL/L — HIGH (ref 7–14)
ANION GAP SERPL CALC-SCNC: 18 MMOL/L
ANION GAP SERPL CALC-SCNC: 20 MMOL/L — HIGH (ref 7–14)
ANISOCYTOSIS BLD QL: SLIGHT — SIGNIFICANT CHANGE UP
APPEARANCE UR: CLEAR — SIGNIFICANT CHANGE UP
APTT BLD: 29.8 SEC — SIGNIFICANT CHANGE UP (ref 27–39.2)
AST SERPL-CCNC: 11 U/L
AST SERPL-CCNC: 12 U/L
AST SERPL-CCNC: 13 U/L
AST SERPL-CCNC: 14 U/L
AST SERPL-CCNC: 14 U/L
AST SERPL-CCNC: 15 U/L
AST SERPL-CCNC: 15 U/L — SIGNIFICANT CHANGE UP (ref 0–41)
AST SERPL-CCNC: 16 U/L — SIGNIFICANT CHANGE UP (ref 0–41)
AST SERPL-CCNC: 16 U/L — SIGNIFICANT CHANGE UP (ref 0–41)
AST SERPL-CCNC: 17 U/L
AST SERPL-CCNC: 17 U/L — SIGNIFICANT CHANGE UP (ref 0–41)
AST SERPL-CCNC: 18 U/L
AST SERPL-CCNC: 19 U/L
AST SERPL-CCNC: 20 U/L
AST SERPL-CCNC: 22 U/L — SIGNIFICANT CHANGE UP (ref 0–41)
AST SERPL-CCNC: 24 U/L — SIGNIFICANT CHANGE UP (ref 0–41)
AST SERPL-CCNC: 25 U/L — SIGNIFICANT CHANGE UP (ref 0–41)
AST SERPL-CCNC: 26 U/L — SIGNIFICANT CHANGE UP (ref 0–41)
AST SERPL-CCNC: 30 U/L
AST SERPL-CCNC: 32 U/L — SIGNIFICANT CHANGE UP (ref 0–41)
AST SERPL-CCNC: 35 U/L — SIGNIFICANT CHANGE UP (ref 0–41)
AST SERPL-CCNC: 38 U/L — SIGNIFICANT CHANGE UP (ref 0–41)
AST SERPL-CCNC: 72 U/L — HIGH (ref 0–41)
B-OH-BUTYR SERPL-SCNC: 1 MMOL/L — HIGH
B-OH-BUTYR SERPL-SCNC: <0.2 MMOL/L — SIGNIFICANT CHANGE UP
BACTERIA # UR AUTO: NEGATIVE — SIGNIFICANT CHANGE UP
BACTERIA # UR AUTO: NEGATIVE — SIGNIFICANT CHANGE UP
BASE EXCESS BLDV CALC-SCNC: 0.8 MMOL/L — SIGNIFICANT CHANGE UP (ref -2–3)
BASOPHILS # BLD AUTO: 0 K/UL — SIGNIFICANT CHANGE UP (ref 0–0.2)
BASOPHILS # BLD AUTO: 0.01 K/UL — SIGNIFICANT CHANGE UP (ref 0–0.2)
BASOPHILS # BLD AUTO: 0.01 K/UL — SIGNIFICANT CHANGE UP (ref 0–0.2)
BASOPHILS # BLD AUTO: 0.02 K/UL — SIGNIFICANT CHANGE UP (ref 0–0.2)
BASOPHILS # BLD AUTO: 0.03 K/UL — SIGNIFICANT CHANGE UP (ref 0–0.2)
BASOPHILS # BLD AUTO: 0.03 K/UL — SIGNIFICANT CHANGE UP (ref 0–0.2)
BASOPHILS # BLD AUTO: 0.05 K/UL
BASOPHILS # BLD AUTO: 0.07 K/UL — SIGNIFICANT CHANGE UP (ref 0–0.2)
BASOPHILS # BLD AUTO: 0.1 K/UL — SIGNIFICANT CHANGE UP (ref 0–0.2)
BASOPHILS # BLD AUTO: 0.14 K/UL
BASOPHILS NFR BLD AUTO: 0 % — SIGNIFICANT CHANGE UP (ref 0–1)
BASOPHILS NFR BLD AUTO: 0.1 % — SIGNIFICANT CHANGE UP (ref 0–1)
BASOPHILS NFR BLD AUTO: 0.1 % — SIGNIFICANT CHANGE UP (ref 0–1)
BASOPHILS NFR BLD AUTO: 0.2 % — SIGNIFICANT CHANGE UP (ref 0–1)
BASOPHILS NFR BLD AUTO: 0.3 %
BASOPHILS NFR BLD AUTO: 0.3 % — SIGNIFICANT CHANGE UP (ref 0–1)
BASOPHILS NFR BLD AUTO: 0.4 % — SIGNIFICANT CHANGE UP (ref 0–1)
BASOPHILS NFR BLD AUTO: 0.6 %
BASOPHILS NFR BLD AUTO: 0.6 % — SIGNIFICANT CHANGE UP (ref 0–1)
BASOPHILS NFR BLD AUTO: 0.6 % — SIGNIFICANT CHANGE UP (ref 0–1)
BILIRUB DIRECT SERPL-MCNC: <0.2 MG/DL — SIGNIFICANT CHANGE UP (ref 0–0.3)
BILIRUB INDIRECT FLD-MCNC: >0.2 MG/DL — SIGNIFICANT CHANGE UP (ref 0.2–1.2)
BILIRUB SERPL-MCNC: 0.2 MG/DL
BILIRUB SERPL-MCNC: 0.3 MG/DL
BILIRUB SERPL-MCNC: 0.3 MG/DL
BILIRUB SERPL-MCNC: 0.3 MG/DL — SIGNIFICANT CHANGE UP (ref 0.2–1.2)
BILIRUB SERPL-MCNC: 0.4 MG/DL
BILIRUB SERPL-MCNC: 0.4 MG/DL — SIGNIFICANT CHANGE UP (ref 0.2–1.2)
BILIRUB SERPL-MCNC: 0.5 MG/DL — SIGNIFICANT CHANGE UP (ref 0.2–1.2)
BILIRUB SERPL-MCNC: 0.5 MG/DL — SIGNIFICANT CHANGE UP (ref 0.2–1.2)
BILIRUB SERPL-MCNC: 0.7 MG/DL — SIGNIFICANT CHANGE UP (ref 0.2–1.2)
BILIRUB SERPL-MCNC: 0.7 MG/DL — SIGNIFICANT CHANGE UP (ref 0.2–1.2)
BILIRUB SERPL-MCNC: <0.2 MG/DL
BILIRUB SERPL-MCNC: <0.2 MG/DL — SIGNIFICANT CHANGE UP (ref 0.2–1.2)
BILIRUB UR-MCNC: NEGATIVE — SIGNIFICANT CHANGE UP
BLD GP AB SCN SERPL QL: SIGNIFICANT CHANGE UP
BUN SERPL-MCNC: 19 MG/DL
BUN SERPL-MCNC: 22 MG/DL
BUN SERPL-MCNC: 22 MG/DL — HIGH (ref 10–20)
BUN SERPL-MCNC: 23 MG/DL — HIGH (ref 10–20)
BUN SERPL-MCNC: 24 MG/DL
BUN SERPL-MCNC: 26 MG/DL
BUN SERPL-MCNC: 26 MG/DL
BUN SERPL-MCNC: 27 MG/DL
BUN SERPL-MCNC: 27 MG/DL — HIGH (ref 10–20)
BUN SERPL-MCNC: 30 MG/DL
BUN SERPL-MCNC: 32 MG/DL — HIGH (ref 10–20)
BUN SERPL-MCNC: 33 MG/DL — HIGH (ref 10–20)
BUN SERPL-MCNC: 35 MG/DL — HIGH (ref 10–20)
BUN SERPL-MCNC: 36 MG/DL
BUN SERPL-MCNC: 37 MG/DL
BUN SERPL-MCNC: 37 MG/DL — HIGH (ref 10–20)
BUN SERPL-MCNC: 47 MG/DL — HIGH (ref 10–20)
BUN SERPL-MCNC: 48 MG/DL — HIGH (ref 10–20)
BUN SERPL-MCNC: 49 MG/DL — HIGH (ref 10–20)
BUN SERPL-MCNC: 49 MG/DL — HIGH (ref 10–20)
BUN SERPL-MCNC: 56 MG/DL — HIGH (ref 10–20)
BUN SERPL-MCNC: 58 MG/DL — HIGH (ref 10–20)
CA-I SERPL-SCNC: 1.18 MMOL/L — SIGNIFICANT CHANGE UP (ref 1.15–1.33)
CALCIUM SERPL-MCNC: 10 MG/DL — SIGNIFICANT CHANGE UP (ref 8.4–10.5)
CALCIUM SERPL-MCNC: 8.5 MG/DL — SIGNIFICANT CHANGE UP (ref 8.5–10.1)
CALCIUM SERPL-MCNC: 8.7 MG/DL — SIGNIFICANT CHANGE UP (ref 8.5–10.1)
CALCIUM SERPL-MCNC: 8.8 MG/DL — SIGNIFICANT CHANGE UP (ref 8.4–10.5)
CALCIUM SERPL-MCNC: 8.8 MG/DL — SIGNIFICANT CHANGE UP (ref 8.5–10.1)
CALCIUM SERPL-MCNC: 8.9 MG/DL
CALCIUM SERPL-MCNC: 9 MG/DL
CALCIUM SERPL-MCNC: 9 MG/DL
CALCIUM SERPL-MCNC: 9 MG/DL — SIGNIFICANT CHANGE UP (ref 8.5–10.1)
CALCIUM SERPL-MCNC: 9.1 MG/DL — SIGNIFICANT CHANGE UP (ref 8.5–10.1)
CALCIUM SERPL-MCNC: 9.3 MG/DL — SIGNIFICANT CHANGE UP (ref 8.5–10.1)
CALCIUM SERPL-MCNC: 9.3 MG/DL — SIGNIFICANT CHANGE UP (ref 8.5–10.1)
CALCIUM SERPL-MCNC: 9.4 MG/DL
CALCIUM SERPL-MCNC: 9.4 MG/DL — SIGNIFICANT CHANGE UP (ref 8.5–10.1)
CALCIUM SERPL-MCNC: 9.5 MG/DL
CALCIUM SERPL-MCNC: 9.5 MG/DL
CALCIUM SERPL-MCNC: 9.5 MG/DL — SIGNIFICANT CHANGE UP (ref 8.5–10.1)
CALCIUM SERPL-MCNC: 9.6 MG/DL
CEA SERPL-MCNC: 1.7 NG/ML
CHLORIDE SERPL-SCNC: 100 MMOL/L — SIGNIFICANT CHANGE UP (ref 98–110)
CHLORIDE SERPL-SCNC: 101 MMOL/L
CHLORIDE SERPL-SCNC: 101 MMOL/L — SIGNIFICANT CHANGE UP (ref 98–110)
CHLORIDE SERPL-SCNC: 101 MMOL/L — SIGNIFICANT CHANGE UP (ref 98–110)
CHLORIDE SERPL-SCNC: 102 MMOL/L
CHLORIDE SERPL-SCNC: 102 MMOL/L
CHLORIDE SERPL-SCNC: 104 MMOL/L
CHLORIDE SERPL-SCNC: 104 MMOL/L
CHLORIDE SERPL-SCNC: 106 MMOL/L
CHLORIDE SERPL-SCNC: 91 MMOL/L — LOW (ref 98–110)
CHLORIDE SERPL-SCNC: 93 MMOL/L — LOW (ref 98–110)
CHLORIDE SERPL-SCNC: 94 MMOL/L — LOW (ref 98–110)
CHLORIDE SERPL-SCNC: 94 MMOL/L — LOW (ref 98–110)
CHLORIDE SERPL-SCNC: 96 MMOL/L — LOW (ref 98–110)
CHLORIDE SERPL-SCNC: 98 MMOL/L
CHLORIDE SERPL-SCNC: 98 MMOL/L
CHLORIDE SERPL-SCNC: 98 MMOL/L — SIGNIFICANT CHANGE UP (ref 98–110)
CHLORIDE SERPL-SCNC: 98 MMOL/L — SIGNIFICANT CHANGE UP (ref 98–110)
CHLORIDE SERPL-SCNC: 99 MMOL/L
CHLORIDE SERPL-SCNC: 99 MMOL/L — SIGNIFICANT CHANGE UP (ref 98–110)
CHOLEST SERPL-MCNC: 163 MG/DL — SIGNIFICANT CHANGE UP
CK MB CFR SERPL CALC: 2.1 NG/ML — SIGNIFICANT CHANGE UP (ref 0.6–6.3)
CK MB CFR SERPL CALC: 2.8 NG/ML — SIGNIFICANT CHANGE UP (ref 0.6–6.3)
CK SERPL-CCNC: 109 U/L — SIGNIFICANT CHANGE UP (ref 0–225)
CO2 SERPL-SCNC: 18 MMOL/L
CO2 SERPL-SCNC: 18 MMOL/L — SIGNIFICANT CHANGE UP (ref 17–32)
CO2 SERPL-SCNC: 19 MMOL/L
CO2 SERPL-SCNC: 19 MMOL/L
CO2 SERPL-SCNC: 19 MMOL/L — SIGNIFICANT CHANGE UP (ref 17–32)
CO2 SERPL-SCNC: 20 MMOL/L
CO2 SERPL-SCNC: 20 MMOL/L
CO2 SERPL-SCNC: 20 MMOL/L — SIGNIFICANT CHANGE UP (ref 17–32)
CO2 SERPL-SCNC: 21 MMOL/L
CO2 SERPL-SCNC: 21 MMOL/L — SIGNIFICANT CHANGE UP (ref 17–32)
CO2 SERPL-SCNC: 22 MMOL/L
CO2 SERPL-SCNC: 22 MMOL/L
CO2 SERPL-SCNC: 22 MMOL/L — SIGNIFICANT CHANGE UP (ref 17–32)
CO2 SERPL-SCNC: 23 MMOL/L
CO2 SERPL-SCNC: 23 MMOL/L — SIGNIFICANT CHANGE UP (ref 17–32)
CO2 SERPL-SCNC: 24 MMOL/L — SIGNIFICANT CHANGE UP (ref 17–32)
CO2 SERPL-SCNC: 26 MMOL/L — SIGNIFICANT CHANGE UP (ref 17–32)
CO2 SERPL-SCNC: 27 MMOL/L — SIGNIFICANT CHANGE UP (ref 17–32)
COLOR SPEC: YELLOW — SIGNIFICANT CHANGE UP
CREAT ?TM UR-MCNC: 95 MG/DL — SIGNIFICANT CHANGE UP
CREAT SERPL-MCNC: 1.1 MG/DL
CREAT SERPL-MCNC: 1.1 MG/DL — SIGNIFICANT CHANGE UP (ref 0.7–1.5)
CREAT SERPL-MCNC: 1.2 MG/DL — SIGNIFICANT CHANGE UP (ref 0.7–1.5)
CREAT SERPL-MCNC: 1.3 MG/DL
CREAT SERPL-MCNC: 1.3 MG/DL — SIGNIFICANT CHANGE UP (ref 0.7–1.5)
CREAT SERPL-MCNC: 1.4 MG/DL
CREAT SERPL-MCNC: 1.4 MG/DL — SIGNIFICANT CHANGE UP (ref 0.7–1.5)
CREAT SERPL-MCNC: 1.5 MG/DL
CREAT SERPL-MCNC: 1.5 MG/DL — SIGNIFICANT CHANGE UP (ref 0.7–1.5)
CREAT SERPL-MCNC: 1.6 MG/DL — HIGH (ref 0.7–1.5)
CREAT SERPL-MCNC: 1.8 MG/DL — HIGH (ref 0.7–1.5)
CREAT SERPL-MCNC: 1.9 MG/DL
CREAT SERPL-MCNC: 1.9 MG/DL — HIGH (ref 0.7–1.5)
CREAT SERPL-MCNC: 1.9 MG/DL — HIGH (ref 0.7–1.5)
CRP SERPL-MCNC: 153 MG/L — HIGH
CULTURE RESULTS: NO GROWTH — SIGNIFICANT CHANGE UP
CULTURE RESULTS: SIGNIFICANT CHANGE UP
DIFF PNL FLD: NEGATIVE — SIGNIFICANT CHANGE UP
EGFR: 36 ML/MIN/1.73M2
EGFR: 36 ML/MIN/1.73M2 — LOW
EGFR: 36 ML/MIN/1.73M2 — LOW
EGFR: 39 ML/MIN/1.73M2 — LOW
EGFR: 45 ML/MIN/1.73M2 — LOW
EGFR: 48 ML/MIN/1.73M2
EGFR: 48 ML/MIN/1.73M2 — LOW
EGFR: 52 ML/MIN/1.73M2
EGFR: 52 ML/MIN/1.73M2 — LOW
EGFR: 57 ML/MIN/1.73M2
EGFR: 57 ML/MIN/1.73M2 — LOW
EGFR: 63 ML/MIN/1.73M2 — SIGNIFICANT CHANGE UP
EGFR: 70 ML/MIN/1.73M2
EGFR: 70 ML/MIN/1.73M2
EGFR: 70 ML/MIN/1.73M2 — SIGNIFICANT CHANGE UP
EOSINOPHIL # BLD AUTO: 0 K/UL — SIGNIFICANT CHANGE UP (ref 0–0.7)
EOSINOPHIL # BLD AUTO: 0.01 K/UL — SIGNIFICANT CHANGE UP (ref 0–0.7)
EOSINOPHIL # BLD AUTO: 0.01 K/UL — SIGNIFICANT CHANGE UP (ref 0–0.7)
EOSINOPHIL # BLD AUTO: 0.02 K/UL — SIGNIFICANT CHANGE UP (ref 0–0.7)
EOSINOPHIL # BLD AUTO: 0.03 K/UL — SIGNIFICANT CHANGE UP (ref 0–0.7)
EOSINOPHIL # BLD AUTO: 0.12 K/UL — SIGNIFICANT CHANGE UP (ref 0–0.7)
EOSINOPHIL # BLD AUTO: 0.35 K/UL — SIGNIFICANT CHANGE UP (ref 0–0.7)
EOSINOPHIL # BLD AUTO: 0.62 K/UL
EOSINOPHIL # BLD AUTO: 0.62 K/UL — SIGNIFICANT CHANGE UP (ref 0–0.7)
EOSINOPHIL # BLD AUTO: 0.75 K/UL
EOSINOPHIL NFR BLD AUTO: 0 % — SIGNIFICANT CHANGE UP (ref 0–8)
EOSINOPHIL NFR BLD AUTO: 0.1 % — SIGNIFICANT CHANGE UP (ref 0–8)
EOSINOPHIL NFR BLD AUTO: 0.1 % — SIGNIFICANT CHANGE UP (ref 0–8)
EOSINOPHIL NFR BLD AUTO: 0.2 % — SIGNIFICANT CHANGE UP (ref 0–8)
EOSINOPHIL NFR BLD AUTO: 0.7 % — SIGNIFICANT CHANGE UP (ref 0–8)
EOSINOPHIL NFR BLD AUTO: 0.8 % — SIGNIFICANT CHANGE UP (ref 0–8)
EOSINOPHIL NFR BLD AUTO: 2.7 % — SIGNIFICANT CHANGE UP (ref 0–8)
EOSINOPHIL NFR BLD AUTO: 3.5 %
EOSINOPHIL NFR BLD AUTO: 4.3 %
EOSINOPHIL NFR BLD AUTO: 5 % — SIGNIFICANT CHANGE UP (ref 0–8)
EPI CELLS # UR: 0 /HPF — SIGNIFICANT CHANGE UP (ref 0–5)
EPI CELLS # UR: 1 /HPF — SIGNIFICANT CHANGE UP (ref 0–5)
ESTIMATED AVERAGE GLUCOSE: 128 MG/DL — HIGH (ref 68–114)
ESTIMATED AVERAGE GLUCOSE: 154 MG/DL — HIGH (ref 68–114)
ESTIMATED AVERAGE GLUCOSE: 189 MG/DL — HIGH (ref 68–114)
ESTIMATED AVERAGE GLUCOSE: 197 MG/DL — HIGH (ref 68–114)
ETHANOL SERPL-MCNC: <10 MG/DL — SIGNIFICANT CHANGE UP
FERRITIN SERPL-MCNC: 3846 NG/ML — HIGH (ref 30–400)
FOLATE SERPL-MCNC: 8.2 NG/ML — SIGNIFICANT CHANGE UP
GAS PNL BLDV: 127 MMOL/L — LOW (ref 136–145)
GAS PNL BLDV: SIGNIFICANT CHANGE UP
GLUCOSE BLDC GLUCOMTR-MCNC: 109 MG/DL — HIGH (ref 70–99)
GLUCOSE BLDC GLUCOMTR-MCNC: 126 MG/DL — HIGH (ref 70–99)
GLUCOSE BLDC GLUCOMTR-MCNC: 130 MG/DL — HIGH (ref 70–99)
GLUCOSE BLDC GLUCOMTR-MCNC: 132 MG/DL — HIGH (ref 70–99)
GLUCOSE BLDC GLUCOMTR-MCNC: 138 MG/DL — HIGH (ref 70–99)
GLUCOSE BLDC GLUCOMTR-MCNC: 138 MG/DL — HIGH (ref 70–99)
GLUCOSE BLDC GLUCOMTR-MCNC: 143 MG/DL — HIGH (ref 70–99)
GLUCOSE BLDC GLUCOMTR-MCNC: 148 MG/DL — HIGH (ref 70–99)
GLUCOSE BLDC GLUCOMTR-MCNC: 160 MG/DL — HIGH (ref 70–99)
GLUCOSE BLDC GLUCOMTR-MCNC: 160 MG/DL — HIGH (ref 70–99)
GLUCOSE BLDC GLUCOMTR-MCNC: 163 MG/DL — HIGH (ref 70–99)
GLUCOSE BLDC GLUCOMTR-MCNC: 168 MG/DL — HIGH (ref 70–99)
GLUCOSE BLDC GLUCOMTR-MCNC: 169 MG/DL — HIGH (ref 70–99)
GLUCOSE BLDC GLUCOMTR-MCNC: 181 MG/DL — HIGH (ref 70–99)
GLUCOSE BLDC GLUCOMTR-MCNC: 188 MG/DL — HIGH (ref 70–99)
GLUCOSE BLDC GLUCOMTR-MCNC: 197 MG/DL — HIGH (ref 70–99)
GLUCOSE BLDC GLUCOMTR-MCNC: 204 MG/DL — HIGH (ref 70–99)
GLUCOSE BLDC GLUCOMTR-MCNC: 217 MG/DL — HIGH (ref 70–99)
GLUCOSE BLDC GLUCOMTR-MCNC: 223 MG/DL — HIGH (ref 70–99)
GLUCOSE BLDC GLUCOMTR-MCNC: 226 MG/DL — HIGH (ref 70–99)
GLUCOSE BLDC GLUCOMTR-MCNC: 263 MG/DL — HIGH (ref 70–99)
GLUCOSE BLDC GLUCOMTR-MCNC: 268 MG/DL — HIGH (ref 70–99)
GLUCOSE BLDC GLUCOMTR-MCNC: 382 MG/DL — HIGH (ref 70–99)
GLUCOSE BLDC GLUCOMTR-MCNC: 394 MG/DL — HIGH (ref 70–99)
GLUCOSE BLDC GLUCOMTR-MCNC: 404 MG/DL — HIGH (ref 70–99)
GLUCOSE BLDC GLUCOMTR-MCNC: 407 MG/DL — HIGH (ref 70–99)
GLUCOSE BLDC GLUCOMTR-MCNC: 412 MG/DL — HIGH (ref 70–99)
GLUCOSE BLDC GLUCOMTR-MCNC: 436 MG/DL — HIGH (ref 70–99)
GLUCOSE BLDC GLUCOMTR-MCNC: 458 MG/DL — CRITICAL HIGH (ref 70–99)
GLUCOSE BLDC GLUCOMTR-MCNC: 461 MG/DL — CRITICAL HIGH (ref 70–99)
GLUCOSE BLDC GLUCOMTR-MCNC: 467 MG/DL — CRITICAL HIGH (ref 70–99)
GLUCOSE BLDC GLUCOMTR-MCNC: 481 MG/DL — CRITICAL HIGH (ref 70–99)
GLUCOSE BLDC GLUCOMTR-MCNC: 483 MG/DL — CRITICAL HIGH (ref 70–99)
GLUCOSE BLDC GLUCOMTR-MCNC: 499 MG/DL — CRITICAL HIGH (ref 70–99)
GLUCOSE BLDC GLUCOMTR-MCNC: 518 MG/DL — CRITICAL HIGH (ref 70–99)
GLUCOSE BLDC GLUCOMTR-MCNC: 529 MG/DL — CRITICAL HIGH (ref 70–99)
GLUCOSE BLDC GLUCOMTR-MCNC: 540 MG/DL — CRITICAL HIGH (ref 70–99)
GLUCOSE BLDC GLUCOMTR-MCNC: 554 MG/DL — CRITICAL HIGH (ref 70–99)
GLUCOSE BLDC GLUCOMTR-MCNC: 557 MG/DL — CRITICAL HIGH (ref 70–99)
GLUCOSE BLDC GLUCOMTR-MCNC: 569 MG/DL — CRITICAL HIGH (ref 70–99)
GLUCOSE BLDC GLUCOMTR-MCNC: 598 MG/DL — CRITICAL HIGH (ref 70–99)
GLUCOSE BLDC GLUCOMTR-MCNC: >600 MG/DL — CRITICAL HIGH (ref 70–99)
GLUCOSE BLDC GLUCOMTR-MCNC: >600 MG/DL — CRITICAL HIGH (ref 70–99)
GLUCOSE SERPL-MCNC: 105 MG/DL
GLUCOSE SERPL-MCNC: 108 MG/DL
GLUCOSE SERPL-MCNC: 116 MG/DL
GLUCOSE SERPL-MCNC: 123 MG/DL
GLUCOSE SERPL-MCNC: 125 MG/DL — HIGH (ref 70–99)
GLUCOSE SERPL-MCNC: 129 MG/DL — HIGH (ref 70–99)
GLUCOSE SERPL-MCNC: 133 MG/DL
GLUCOSE SERPL-MCNC: 135 MG/DL
GLUCOSE SERPL-MCNC: 140 MG/DL — HIGH (ref 70–99)
GLUCOSE SERPL-MCNC: 141 MG/DL
GLUCOSE SERPL-MCNC: 143 MG/DL — HIGH (ref 70–99)
GLUCOSE SERPL-MCNC: 153 MG/DL — HIGH (ref 70–99)
GLUCOSE SERPL-MCNC: 166 MG/DL — HIGH (ref 70–99)
GLUCOSE SERPL-MCNC: 168 MG/DL
GLUCOSE SERPL-MCNC: 199 MG/DL — HIGH (ref 70–99)
GLUCOSE SERPL-MCNC: 209 MG/DL
GLUCOSE SERPL-MCNC: 213 MG/DL
GLUCOSE SERPL-MCNC: 271 MG/DL — HIGH (ref 70–99)
GLUCOSE SERPL-MCNC: 286 MG/DL
GLUCOSE SERPL-MCNC: 303 MG/DL — HIGH (ref 70–99)
GLUCOSE SERPL-MCNC: 423 MG/DL — HIGH (ref 70–99)
GLUCOSE SERPL-MCNC: 431 MG/DL — HIGH (ref 70–99)
GLUCOSE SERPL-MCNC: 470 MG/DL — CRITICAL HIGH (ref 70–99)
GLUCOSE SERPL-MCNC: 679 MG/DL — CRITICAL HIGH (ref 70–99)
GLUCOSE UR QL: ABNORMAL
GLUCOSE UR QL: NEGATIVE MG/DL — SIGNIFICANT CHANGE UP
GLUCOSE UR QL: NEGATIVE — SIGNIFICANT CHANGE UP
GRAM STN FLD: SIGNIFICANT CHANGE UP
GRAM STN FLD: SIGNIFICANT CHANGE UP
HCO3 BLDV-SCNC: 25 MMOL/L — SIGNIFICANT CHANGE UP (ref 22–29)
HCT VFR BLD CALC: 23.3 % — LOW (ref 42–52)
HCT VFR BLD CALC: 23.7 % — LOW (ref 42–52)
HCT VFR BLD CALC: 24.8 % — LOW (ref 42–52)
HCT VFR BLD CALC: 25.6 % — LOW (ref 42–52)
HCT VFR BLD CALC: 25.8 % — LOW (ref 42–52)
HCT VFR BLD CALC: 26.1 % — LOW (ref 42–52)
HCT VFR BLD CALC: 26.4 % — LOW (ref 42–52)
HCT VFR BLD CALC: 26.9 % — LOW (ref 42–52)
HCT VFR BLD CALC: 26.9 % — LOW (ref 42–52)
HCT VFR BLD CALC: 27 % — LOW (ref 42–52)
HCT VFR BLD CALC: 27.2 %
HCT VFR BLD CALC: 27.3 %
HCT VFR BLD CALC: 29.2 %
HCT VFR BLD CALC: 29.4 %
HCT VFR BLD CALC: 29.8 %
HCT VFR BLD CALC: 29.9 % — LOW (ref 42–52)
HCT VFR BLD CALC: 30.3 % — LOW (ref 42–52)
HCT VFR BLD CALC: 30.9 %
HCT VFR BLD CALC: 31.4 %
HCT VFR BLD CALC: 32.2 % — LOW (ref 42–52)
HCT VFR BLD CALC: 33.4 %
HCT VFR BLD CALC: 34.9 %
HCT VFR BLD CALC: 39.2 %
HCT VFR BLD CALC: 39.5 %
HCT VFR BLD CALC: 39.6 %
HCT VFR BLD CALC: 41.5 %
HCT VFR BLDA CALC: 31 % — LOW (ref 39–51)
HDLC SERPL-MCNC: 32 MG/DL — LOW
HGB BLD CALC-MCNC: 10.2 G/DL — LOW (ref 12.6–17.4)
HGB BLD-MCNC: 10.3 G/DL
HGB BLD-MCNC: 10.3 G/DL
HGB BLD-MCNC: 10.3 G/DL — LOW (ref 14–18)
HGB BLD-MCNC: 11.3 G/DL
HGB BLD-MCNC: 11.3 G/DL
HGB BLD-MCNC: 12.8 G/DL
HGB BLD-MCNC: 13 G/DL
HGB BLD-MCNC: 13.1 G/DL
HGB BLD-MCNC: 14 G/DL
HGB BLD-MCNC: 7.4 G/DL — LOW (ref 14–18)
HGB BLD-MCNC: 7.5 G/DL — LOW (ref 14–18)
HGB BLD-MCNC: 8.4 G/DL — LOW (ref 14–18)
HGB BLD-MCNC: 8.7 G/DL — LOW (ref 14–18)
HGB BLD-MCNC: 8.7 G/DL — LOW (ref 14–18)
HGB BLD-MCNC: 8.8 G/DL — LOW (ref 14–18)
HGB BLD-MCNC: 8.8 G/DL — LOW (ref 14–18)
HGB BLD-MCNC: 8.9 G/DL
HGB BLD-MCNC: 8.9 G/DL
HGB BLD-MCNC: 8.9 G/DL — LOW (ref 14–18)
HGB BLD-MCNC: 8.9 G/DL — LOW (ref 14–18)
HGB BLD-MCNC: 9 G/DL — LOW (ref 14–18)
HGB BLD-MCNC: 9.2 G/DL
HGB BLD-MCNC: 9.3 G/DL
HGB BLD-MCNC: 9.4 G/DL — LOW (ref 14–18)
HGB BLD-MCNC: 9.6 G/DL
HGB BLD-MCNC: 9.7 G/DL — LOW (ref 14–18)
HYALINE CASTS # UR AUTO: 2 /LPF — SIGNIFICANT CHANGE UP (ref 0–7)
HYALINE CASTS # UR AUTO: 6 /LPF — SIGNIFICANT CHANGE UP (ref 0–7)
HYPOCHROMIA BLD QL: SLIGHT — SIGNIFICANT CHANGE UP
IMM GRANULOCYTES NFR BLD AUTO: 0.8 % — HIGH (ref 0.1–0.3)
IMM GRANULOCYTES NFR BLD AUTO: 1.2 % — HIGH (ref 0.1–0.3)
IMM GRANULOCYTES NFR BLD AUTO: 1.5 % — HIGH (ref 0.1–0.3)
IMM GRANULOCYTES NFR BLD AUTO: 1.6 % — HIGH (ref 0.1–0.3)
IMM GRANULOCYTES NFR BLD AUTO: 2.1 % — HIGH (ref 0.1–0.3)
IMM GRANULOCYTES NFR BLD AUTO: 2.3 %
IMM GRANULOCYTES NFR BLD AUTO: 2.4 % — HIGH (ref 0.1–0.3)
IMM GRANULOCYTES NFR BLD AUTO: 3.3 % — HIGH (ref 0.1–0.3)
IMM GRANULOCYTES NFR BLD AUTO: 4.6 %
IMM GRANULOCYTES NFR BLD AUTO: 5.4 % — HIGH (ref 0.1–0.3)
IMM GRANULOCYTES NFR BLD AUTO: 9 % — HIGH (ref 0.1–0.3)
INR BLD: 1.2 RATIO — SIGNIFICANT CHANGE UP (ref 0.65–1.3)
IRON SATN MFR SERPL: 20 % — SIGNIFICANT CHANGE UP (ref 15–50)
IRON SATN MFR SERPL: 42 UG/DL — SIGNIFICANT CHANGE UP (ref 35–150)
KETONES UR-MCNC: NEGATIVE — SIGNIFICANT CHANGE UP
LACTATE BLDV-MCNC: 1.8 MMOL/L — SIGNIFICANT CHANGE UP (ref 0.5–2)
LACTATE SERPL-SCNC: 1.9 MMOL/L — SIGNIFICANT CHANGE UP (ref 0.7–2)
LEGIONELLA AG UR QL: NEGATIVE — SIGNIFICANT CHANGE UP
LEGIONELLA AG UR QL: NEGATIVE — SIGNIFICANT CHANGE UP
LEUKOCYTE ESTERASE UR-ACNC: NEGATIVE — SIGNIFICANT CHANGE UP
LIDOCAIN IGE QN: 20 U/L — SIGNIFICANT CHANGE UP (ref 7–60)
LIDOCAIN IGE QN: 23 U/L — SIGNIFICANT CHANGE UP (ref 7–60)
LIPID PNL WITH DIRECT LDL SERPL: 85 MG/DL — SIGNIFICANT CHANGE UP
LYMPHOCYTES # BLD AUTO: 0.4 K/UL — LOW (ref 1.2–3.4)
LYMPHOCYTES # BLD AUTO: 0.48 K/UL — LOW (ref 1.2–3.4)
LYMPHOCYTES # BLD AUTO: 0.53 K/UL — LOW (ref 1.2–3.4)
LYMPHOCYTES # BLD AUTO: 0.55 K/UL — LOW (ref 1.2–3.4)
LYMPHOCYTES # BLD AUTO: 0.76 K/UL — LOW (ref 1.2–3.4)
LYMPHOCYTES # BLD AUTO: 0.78 K/UL — LOW (ref 1.2–3.4)
LYMPHOCYTES # BLD AUTO: 1.07 K/UL — LOW (ref 1.2–3.4)
LYMPHOCYTES # BLD AUTO: 1.11 K/UL
LYMPHOCYTES # BLD AUTO: 1.24 K/UL — SIGNIFICANT CHANGE UP (ref 1.2–3.4)
LYMPHOCYTES # BLD AUTO: 1.4 K/UL — SIGNIFICANT CHANGE UP (ref 1.2–3.4)
LYMPHOCYTES # BLD AUTO: 1.5 K/UL — SIGNIFICANT CHANGE UP (ref 1.2–3.4)
LYMPHOCYTES # BLD AUTO: 1.65 K/UL
LYMPHOCYTES # BLD AUTO: 13.8 % — LOW (ref 20.5–51.1)
LYMPHOCYTES # BLD AUTO: 3.4 % — LOW (ref 20.5–51.1)
LYMPHOCYTES # BLD AUTO: 4.3 % — LOW (ref 20.5–51.1)
LYMPHOCYTES # BLD AUTO: 4.7 % — LOW (ref 20.5–51.1)
LYMPHOCYTES # BLD AUTO: 5.4 % — LOW (ref 20.5–51.1)
LYMPHOCYTES # BLD AUTO: 8.3 % — LOW (ref 20.5–51.1)
LYMPHOCYTES # BLD AUTO: 8.7 % — LOW (ref 20.5–51.1)
LYMPHOCYTES # BLD AUTO: 8.7 % — LOW (ref 20.5–51.1)
LYMPHOCYTES # BLD AUTO: 8.8 % — LOW (ref 20.5–51.1)
LYMPHOCYTES # BLD AUTO: 9.5 % — LOW (ref 20.5–51.1)
LYMPHOCYTES NFR BLD AUTO: 11.4 %
LYMPHOCYTES NFR BLD AUTO: 5.1 %
MACROCYTES BLD QL: SLIGHT — SIGNIFICANT CHANGE UP
MAGNESIUM SERPL-MCNC: 1.7 MG/DL — LOW (ref 1.8–2.4)
MAGNESIUM SERPL-MCNC: 1.8 MG/DL
MAGNESIUM SERPL-MCNC: 1.8 MG/DL
MAGNESIUM SERPL-MCNC: 1.8 MG/DL — SIGNIFICANT CHANGE UP (ref 1.8–2.4)
MAGNESIUM SERPL-MCNC: 1.9 MG/DL
MAGNESIUM SERPL-MCNC: 1.9 MG/DL — SIGNIFICANT CHANGE UP (ref 1.8–2.4)
MAGNESIUM SERPL-MCNC: 1.9 MG/DL — SIGNIFICANT CHANGE UP (ref 1.8–2.4)
MAGNESIUM SERPL-MCNC: 2 MG/DL — SIGNIFICANT CHANGE UP (ref 1.8–2.4)
MAN DIFF?: NORMAL
MAN DIFF?: NORMAL
MANUAL SMEAR VERIFICATION: SIGNIFICANT CHANGE UP
MCHC RBC-ENTMCNC: 28.6 PG
MCHC RBC-ENTMCNC: 28.8 PG — SIGNIFICANT CHANGE UP (ref 27–31)
MCHC RBC-ENTMCNC: 29 PG — SIGNIFICANT CHANGE UP (ref 27–31)
MCHC RBC-ENTMCNC: 29.2 PG
MCHC RBC-ENTMCNC: 29.2 PG — SIGNIFICANT CHANGE UP (ref 27–31)
MCHC RBC-ENTMCNC: 29.4 PG
MCHC RBC-ENTMCNC: 29.4 PG — SIGNIFICANT CHANGE UP (ref 27–31)
MCHC RBC-ENTMCNC: 29.5 PG — SIGNIFICANT CHANGE UP (ref 27–31)
MCHC RBC-ENTMCNC: 29.7 PG
MCHC RBC-ENTMCNC: 29.7 PG
MCHC RBC-ENTMCNC: 29.7 PG — SIGNIFICANT CHANGE UP (ref 27–31)
MCHC RBC-ENTMCNC: 29.8 PG
MCHC RBC-ENTMCNC: 29.8 PG — SIGNIFICANT CHANGE UP (ref 27–31)
MCHC RBC-ENTMCNC: 29.9 PG
MCHC RBC-ENTMCNC: 30 PG — SIGNIFICANT CHANGE UP (ref 27–31)
MCHC RBC-ENTMCNC: 30.1 PG
MCHC RBC-ENTMCNC: 30.1 PG
MCHC RBC-ENTMCNC: 30.1 PG — SIGNIFICANT CHANGE UP (ref 27–31)
MCHC RBC-ENTMCNC: 30.2 PG
MCHC RBC-ENTMCNC: 30.2 PG
MCHC RBC-ENTMCNC: 30.4 PG — SIGNIFICANT CHANGE UP (ref 27–31)
MCHC RBC-ENTMCNC: 30.5 PG — SIGNIFICANT CHANGE UP (ref 27–31)
MCHC RBC-ENTMCNC: 30.8 PG
MCHC RBC-ENTMCNC: 31.2 G/DL
MCHC RBC-ENTMCNC: 31.2 G/DL — LOW (ref 32–37)
MCHC RBC-ENTMCNC: 31.4 G/DL — LOW (ref 32–37)
MCHC RBC-ENTMCNC: 31.5 G/DL
MCHC RBC-ENTMCNC: 31.9 PG
MCHC RBC-ENTMCNC: 32 G/DL — SIGNIFICANT CHANGE UP (ref 32–37)
MCHC RBC-ENTMCNC: 32 G/DL — SIGNIFICANT CHANGE UP (ref 32–37)
MCHC RBC-ENTMCNC: 32.2 G/DL — SIGNIFICANT CHANGE UP (ref 32–37)
MCHC RBC-ENTMCNC: 32.4 G/DL
MCHC RBC-ENTMCNC: 32.4 G/DL
MCHC RBC-ENTMCNC: 32.6 G/DL
MCHC RBC-ENTMCNC: 32.7 G/DL
MCHC RBC-ENTMCNC: 32.7 G/DL
MCHC RBC-ENTMCNC: 32.8 G/DL
MCHC RBC-ENTMCNC: 33 G/DL — SIGNIFICANT CHANGE UP (ref 32–37)
MCHC RBC-ENTMCNC: 33.1 G/DL
MCHC RBC-ENTMCNC: 33.1 G/DL — SIGNIFICANT CHANGE UP (ref 32–37)
MCHC RBC-ENTMCNC: 33.2 G/DL
MCHC RBC-ENTMCNC: 33.3 G/DL
MCHC RBC-ENTMCNC: 33.3 G/DL — SIGNIFICANT CHANGE UP (ref 32–37)
MCHC RBC-ENTMCNC: 33.5 G/DL — SIGNIFICANT CHANGE UP (ref 32–37)
MCHC RBC-ENTMCNC: 33.7 G/DL
MCHC RBC-ENTMCNC: 33.7 G/DL — SIGNIFICANT CHANGE UP (ref 32–37)
MCHC RBC-ENTMCNC: 33.7 G/DL — SIGNIFICANT CHANGE UP (ref 32–37)
MCHC RBC-ENTMCNC: 33.8 G/DL
MCHC RBC-ENTMCNC: 33.9 G/DL — SIGNIFICANT CHANGE UP (ref 32–37)
MCHC RBC-ENTMCNC: 34 G/DL — SIGNIFICANT CHANGE UP (ref 32–37)
MCV RBC AUTO: 86.8 FL — SIGNIFICANT CHANGE UP (ref 80–94)
MCV RBC AUTO: 87 FL
MCV RBC AUTO: 87.5 FL — SIGNIFICANT CHANGE UP (ref 80–94)
MCV RBC AUTO: 87.6 FL — SIGNIFICANT CHANGE UP (ref 80–94)
MCV RBC AUTO: 88.5 FL — SIGNIFICANT CHANGE UP (ref 80–94)
MCV RBC AUTO: 88.6 FL — SIGNIFICANT CHANGE UP (ref 80–94)
MCV RBC AUTO: 89.2 FL — SIGNIFICANT CHANGE UP (ref 80–94)
MCV RBC AUTO: 89.6 FL
MCV RBC AUTO: 89.6 FL
MCV RBC AUTO: 89.7 FL
MCV RBC AUTO: 89.8 FL
MCV RBC AUTO: 89.9 FL — SIGNIFICANT CHANGE UP (ref 80–94)
MCV RBC AUTO: 90.3 FL — SIGNIFICANT CHANGE UP (ref 80–94)
MCV RBC AUTO: 90.6 FL — SIGNIFICANT CHANGE UP (ref 80–94)
MCV RBC AUTO: 91.7 FL
MCV RBC AUTO: 92.1 FL
MCV RBC AUTO: 92.1 FL
MCV RBC AUTO: 92.2 FL
MCV RBC AUTO: 92.7 FL
MCV RBC AUTO: 92.8 FL
MCV RBC AUTO: 93.2 FL — SIGNIFICANT CHANGE UP (ref 80–94)
MCV RBC AUTO: 93.4 FL — SIGNIFICANT CHANGE UP (ref 80–94)
MCV RBC AUTO: 94.5 FL
MCV RBC AUTO: 94.7 FL — HIGH (ref 80–94)
MCV RBC AUTO: 95.2 FL — HIGH (ref 80–94)
MCV RBC AUTO: 97.5 FL
MICROCYTES BLD QL: SLIGHT — SIGNIFICANT CHANGE UP
MONOCYTES # BLD AUTO: 0.22 K/UL — SIGNIFICANT CHANGE UP (ref 0.1–0.6)
MONOCYTES # BLD AUTO: 0.34 K/UL — SIGNIFICANT CHANGE UP (ref 0.1–0.6)
MONOCYTES # BLD AUTO: 0.49 K/UL — SIGNIFICANT CHANGE UP (ref 0.1–0.6)
MONOCYTES # BLD AUTO: 0.59 K/UL — SIGNIFICANT CHANGE UP (ref 0.1–0.6)
MONOCYTES # BLD AUTO: 0.74 K/UL — HIGH (ref 0.1–0.6)
MONOCYTES # BLD AUTO: 0.88 K/UL — HIGH (ref 0.1–0.6)
MONOCYTES # BLD AUTO: 0.91 K/UL — HIGH (ref 0.1–0.6)
MONOCYTES # BLD AUTO: 0.95 K/UL — HIGH (ref 0.1–0.6)
MONOCYTES # BLD AUTO: 1.5 K/UL
MONOCYTES # BLD AUTO: 1.59 K/UL
MONOCYTES # BLD AUTO: 1.6 K/UL — HIGH (ref 0.1–0.6)
MONOCYTES # BLD AUTO: 1.95 K/UL — HIGH (ref 0.1–0.6)
MONOCYTES NFR BLD AUTO: 10.4 %
MONOCYTES NFR BLD AUTO: 11.3 % — HIGH (ref 1.7–9.3)
MONOCYTES NFR BLD AUTO: 3.7 % — SIGNIFICANT CHANGE UP (ref 1.7–9.3)
MONOCYTES NFR BLD AUTO: 5.2 % — SIGNIFICANT CHANGE UP (ref 1.7–9.3)
MONOCYTES NFR BLD AUTO: 5.3 % — SIGNIFICANT CHANGE UP (ref 1.7–9.3)
MONOCYTES NFR BLD AUTO: 5.4 % — SIGNIFICANT CHANGE UP (ref 1.7–9.3)
MONOCYTES NFR BLD AUTO: 5.5 % — SIGNIFICANT CHANGE UP (ref 1.7–9.3)
MONOCYTES NFR BLD AUTO: 6.2 % — SIGNIFICANT CHANGE UP (ref 1.7–9.3)
MONOCYTES NFR BLD AUTO: 6.8 % — SIGNIFICANT CHANGE UP (ref 1.7–9.3)
MONOCYTES NFR BLD AUTO: 7.4 %
MONOCYTES NFR BLD AUTO: 7.7 % — SIGNIFICANT CHANGE UP (ref 1.7–9.3)
MONOCYTES NFR BLD AUTO: 9.5 % — HIGH (ref 1.7–9.3)
MRSA PCR RESULT.: NEGATIVE — SIGNIFICANT CHANGE UP
MRSA PCR RESULT.: NEGATIVE — SIGNIFICANT CHANGE UP
NEUTROPHILS # BLD AUTO: 10.3 K/UL
NEUTROPHILS # BLD AUTO: 12.53 K/UL — HIGH (ref 1.4–6.5)
NEUTROPHILS # BLD AUTO: 13.67 K/UL — HIGH (ref 1.4–6.5)
NEUTROPHILS # BLD AUTO: 13.82 K/UL — HIGH (ref 1.4–6.5)
NEUTROPHILS # BLD AUTO: 14.16 K/UL — HIGH (ref 1.4–6.5)
NEUTROPHILS # BLD AUTO: 14.34 K/UL — HIGH (ref 1.4–6.5)
NEUTROPHILS # BLD AUTO: 16.97 K/UL
NEUTROPHILS # BLD AUTO: 3.04 K/UL — SIGNIFICANT CHANGE UP (ref 1.4–6.5)
NEUTROPHILS # BLD AUTO: 4.53 K/UL — SIGNIFICANT CHANGE UP (ref 1.4–6.5)
NEUTROPHILS # BLD AUTO: 8.2 K/UL — HIGH (ref 1.4–6.5)
NEUTROPHILS # BLD AUTO: 8.51 K/UL — HIGH (ref 1.4–6.5)
NEUTROPHILS # BLD AUTO: 9.8 K/UL — HIGH (ref 1.4–6.5)
NEUTROPHILS NFR BLD AUTO: 69 % — SIGNIFICANT CHANGE UP (ref 42.2–75.2)
NEUTROPHILS NFR BLD AUTO: 71.3 %
NEUTROPHILS NFR BLD AUTO: 75.4 % — HIGH (ref 42.2–75.2)
NEUTROPHILS NFR BLD AUTO: 76.3 % — HIGH (ref 42.2–75.2)
NEUTROPHILS NFR BLD AUTO: 78.8 %
NEUTROPHILS NFR BLD AUTO: 79.1 % — HIGH (ref 42.2–75.2)
NEUTROPHILS NFR BLD AUTO: 82.4 % — HIGH (ref 42.2–75.2)
NEUTROPHILS NFR BLD AUTO: 82.9 % — HIGH (ref 42.2–75.2)
NEUTROPHILS NFR BLD AUTO: 83.7 % — HIGH (ref 42.2–75.2)
NEUTROPHILS NFR BLD AUTO: 88.3 % — HIGH (ref 42.2–75.2)
NEUTROPHILS NFR BLD AUTO: 89.1 % — HIGH (ref 42.2–75.2)
NEUTROPHILS NFR BLD AUTO: 91.1 % — HIGH (ref 42.2–75.2)
NITRITE UR-MCNC: NEGATIVE — SIGNIFICANT CHANGE UP
NON HDL CHOLESTEROL: 131 MG/DL — HIGH
NRBC # BLD: 0 /100 WBCS — SIGNIFICANT CHANGE UP (ref 0–0)
NT-PROBNP SERPL-SCNC: 2186 PG/ML — HIGH (ref 0–300)
NT-PROBNP SERPL-SCNC: 2708 PG/ML — HIGH (ref 0–300)
OSMOLALITY UR: 418 MOS/KG — SIGNIFICANT CHANGE UP (ref 50–1200)
PCO2 BLDV: 39 MMHG — LOW (ref 42–55)
PH BLDV: 7.42 — SIGNIFICANT CHANGE UP (ref 7.32–7.43)
PH UR: 5.5 — SIGNIFICANT CHANGE UP (ref 5–8)
PH UR: 6 — SIGNIFICANT CHANGE UP (ref 5–8)
PH UR: 6 — SIGNIFICANT CHANGE UP (ref 5–8)
PHOSPHATE SERPL-MCNC: 4.9 MG/DL — SIGNIFICANT CHANGE UP (ref 2.1–4.9)
PLAT MORPH BLD: NORMAL — SIGNIFICANT CHANGE UP
PLATELET # BLD AUTO: 129 K/UL — LOW (ref 130–400)
PLATELET # BLD AUTO: 153 K/UL
PLATELET # BLD AUTO: 162 K/UL
PLATELET # BLD AUTO: 164 K/UL
PLATELET # BLD AUTO: 167 K/UL — SIGNIFICANT CHANGE UP (ref 130–400)
PLATELET # BLD AUTO: 169 K/UL
PLATELET # BLD AUTO: 172 K/UL — SIGNIFICANT CHANGE UP (ref 130–400)
PLATELET # BLD AUTO: 195 K/UL
PLATELET # BLD AUTO: 205 K/UL — SIGNIFICANT CHANGE UP (ref 130–400)
PLATELET # BLD AUTO: 206 K/UL — SIGNIFICANT CHANGE UP (ref 130–400)
PLATELET # BLD AUTO: 212 K/UL — SIGNIFICANT CHANGE UP (ref 130–400)
PLATELET # BLD AUTO: 215 K/UL — SIGNIFICANT CHANGE UP (ref 130–400)
PLATELET # BLD AUTO: 230 K/UL
PLATELET # BLD AUTO: 245 K/UL
PLATELET # BLD AUTO: 251 K/UL
PLATELET # BLD AUTO: 265 K/UL — SIGNIFICANT CHANGE UP (ref 130–400)
PLATELET # BLD AUTO: 276 K/UL — SIGNIFICANT CHANGE UP (ref 130–400)
PLATELET # BLD AUTO: 277 K/UL
PLATELET # BLD AUTO: 294 K/UL — SIGNIFICANT CHANGE UP (ref 130–400)
PLATELET # BLD AUTO: 295 K/UL
PLATELET # BLD AUTO: 299 K/UL — SIGNIFICANT CHANGE UP (ref 130–400)
PLATELET # BLD AUTO: 424 K/UL
PLATELET # BLD AUTO: 538 K/UL
PLATELET # BLD AUTO: 74 K/UL — LOW (ref 130–400)
PLATELET # BLD AUTO: 91 K/UL
PLATELET # BLD AUTO: 91 K/UL — LOW (ref 130–400)
PMV BLD: 10.3 FL
PMV BLD: 10.7 FL
PMV BLD: 10.8 FL
PMV BLD: 9 FL
PMV BLD: 9.4 FL
PMV BLD: 9.5 FL
PMV BLD: 9.7 FL
PMV BLD: 9.7 FL
PMV BLD: 9.8 FL
PO2 BLDV: 30 MMHG — SIGNIFICANT CHANGE UP
POLYCHROMASIA BLD QL SMEAR: SLIGHT — SIGNIFICANT CHANGE UP
POTASSIUM BLDV-SCNC: 4.3 MMOL/L — SIGNIFICANT CHANGE UP (ref 3.5–5.1)
POTASSIUM SERPL-MCNC: 4.1 MMOL/L — SIGNIFICANT CHANGE UP (ref 3.5–5)
POTASSIUM SERPL-MCNC: 4.2 MMOL/L — SIGNIFICANT CHANGE UP (ref 3.5–5)
POTASSIUM SERPL-MCNC: 4.5 MMOL/L — SIGNIFICANT CHANGE UP (ref 3.5–5)
POTASSIUM SERPL-MCNC: 4.6 MMOL/L — SIGNIFICANT CHANGE UP (ref 3.5–5)
POTASSIUM SERPL-MCNC: 4.8 MMOL/L — SIGNIFICANT CHANGE UP (ref 3.5–5)
POTASSIUM SERPL-MCNC: 4.9 MMOL/L — SIGNIFICANT CHANGE UP (ref 3.5–5)
POTASSIUM SERPL-MCNC: 5.1 MMOL/L — HIGH (ref 3.5–5)
POTASSIUM SERPL-MCNC: 5.2 MMOL/L — HIGH (ref 3.5–5)
POTASSIUM SERPL-MCNC: 5.4 MMOL/L — HIGH (ref 3.5–5)
POTASSIUM SERPL-MCNC: 5.4 MMOL/L — HIGH (ref 3.5–5)
POTASSIUM SERPL-MCNC: 5.6 MMOL/L — HIGH (ref 3.5–5)
POTASSIUM SERPL-SCNC: 4.1 MMOL/L — SIGNIFICANT CHANGE UP (ref 3.5–5)
POTASSIUM SERPL-SCNC: 4.2 MMOL/L — SIGNIFICANT CHANGE UP (ref 3.5–5)
POTASSIUM SERPL-SCNC: 4.5 MMOL/L — SIGNIFICANT CHANGE UP (ref 3.5–5)
POTASSIUM SERPL-SCNC: 4.6 MMOL/L
POTASSIUM SERPL-SCNC: 4.6 MMOL/L — SIGNIFICANT CHANGE UP (ref 3.5–5)
POTASSIUM SERPL-SCNC: 4.8 MMOL/L
POTASSIUM SERPL-SCNC: 4.8 MMOL/L — SIGNIFICANT CHANGE UP (ref 3.5–5)
POTASSIUM SERPL-SCNC: 4.9 MMOL/L
POTASSIUM SERPL-SCNC: 4.9 MMOL/L
POTASSIUM SERPL-SCNC: 4.9 MMOL/L — SIGNIFICANT CHANGE UP (ref 3.5–5)
POTASSIUM SERPL-SCNC: 5.1 MMOL/L
POTASSIUM SERPL-SCNC: 5.1 MMOL/L — HIGH (ref 3.5–5)
POTASSIUM SERPL-SCNC: 5.2 MMOL/L
POTASSIUM SERPL-SCNC: 5.2 MMOL/L — HIGH (ref 3.5–5)
POTASSIUM SERPL-SCNC: 5.4 MMOL/L — HIGH (ref 3.5–5)
POTASSIUM SERPL-SCNC: 5.4 MMOL/L — HIGH (ref 3.5–5)
POTASSIUM SERPL-SCNC: 5.5 MMOL/L
POTASSIUM SERPL-SCNC: 5.5 MMOL/L
POTASSIUM SERPL-SCNC: 5.6 MMOL/L — HIGH (ref 3.5–5)
POTASSIUM SERPL-SCNC: 6.3 MMOL/L
PROCALCITONIN SERPL-MCNC: 1.22 NG/ML — HIGH (ref 0.02–0.1)
PROT SERPL-MCNC: 5.2 G/DL — LOW (ref 6–8)
PROT SERPL-MCNC: 5.3 G/DL — LOW (ref 6–8)
PROT SERPL-MCNC: 5.5 G/DL — LOW (ref 6–8)
PROT SERPL-MCNC: 5.7 G/DL — LOW (ref 6–8)
PROT SERPL-MCNC: 5.7 G/DL — LOW (ref 6–8)
PROT SERPL-MCNC: 6.1 G/DL — SIGNIFICANT CHANGE UP (ref 6–8)
PROT SERPL-MCNC: 6.1 G/DL — SIGNIFICANT CHANGE UP (ref 6–8)
PROT SERPL-MCNC: 6.3 G/DL
PROT SERPL-MCNC: 6.4 G/DL
PROT SERPL-MCNC: 6.4 G/DL — SIGNIFICANT CHANGE UP (ref 6–8)
PROT SERPL-MCNC: 6.5 G/DL
PROT SERPL-MCNC: 6.5 G/DL
PROT SERPL-MCNC: 6.6 G/DL — SIGNIFICANT CHANGE UP (ref 6–8)
PROT SERPL-MCNC: 6.7 G/DL
PROT SERPL-MCNC: 6.7 G/DL — SIGNIFICANT CHANGE UP (ref 6–8)
PROT SERPL-MCNC: 6.7 G/DL — SIGNIFICANT CHANGE UP (ref 6–8)
PROT SERPL-MCNC: 6.8 G/DL
PROT SERPL-MCNC: 6.8 G/DL
PROT SERPL-MCNC: 6.9 G/DL
PROT SERPL-MCNC: 6.9 G/DL
PROT SERPL-MCNC: 7 G/DL
PROT SERPL-MCNC: 7.3 G/DL
PROT SERPL-MCNC: 7.9 G/DL — SIGNIFICANT CHANGE UP (ref 6–8)
PROT UR-MCNC: ABNORMAL
PROT UR-MCNC: ABNORMAL
PROT UR-MCNC: NEGATIVE MG/DL — SIGNIFICANT CHANGE UP
PROTHROM AB SERPL-ACNC: 13.8 SEC — HIGH (ref 9.95–12.87)
RAPID RVP RESULT: SIGNIFICANT CHANGE UP
RBC # BLD: 2.46 M/UL — LOW (ref 4.7–6.1)
RBC # BLD: 2.49 M/UL — LOW (ref 4.7–6.1)
RBC # BLD: 2.79 M/UL
RBC # BLD: 2.83 M/UL — LOW (ref 4.7–6.1)
RBC # BLD: 2.89 M/UL
RBC # BLD: 2.89 M/UL — LOW (ref 4.7–6.1)
RBC # BLD: 2.89 M/UL — LOW (ref 4.7–6.1)
RBC # BLD: 2.95 M/UL — LOW (ref 4.7–6.1)
RBC # BLD: 2.96 M/UL — LOW (ref 4.7–6.1)
RBC # BLD: 2.97 M/UL — LOW (ref 4.7–6.1)
RBC # BLD: 3.05 M/UL — LOW (ref 4.7–6.1)
RBC # BLD: 3.1 M/UL — LOW (ref 4.7–6.1)
RBC # BLD: 3.15 M/UL
RBC # BLD: 3.19 M/UL
RBC # BLD: 3.2 M/UL — LOW (ref 4.7–6.1)
RBC # BLD: 3.25 M/UL
RBC # BLD: 3.25 M/UL — LOW (ref 4.7–6.1)
RBC # BLD: 3.41 M/UL
RBC # BLD: 3.45 M/UL
RBC # BLD: 3.58 M/UL — LOW (ref 4.7–6.1)
RBC # BLD: 3.76 M/UL
RBC # BLD: 3.84 M/UL
RBC # BLD: 4.29 M/UL
RBC # BLD: 4.37 M/UL
RBC # BLD: 4.41 M/UL
RBC # BLD: 4.63 M/UL
RBC # FLD: 12.4 %
RBC # FLD: 13.2 %
RBC # FLD: 13.2 %
RBC # FLD: 13.7 %
RBC # FLD: 13.9 %
RBC # FLD: 13.9 % — SIGNIFICANT CHANGE UP (ref 11.5–14.5)
RBC # FLD: 14 % — SIGNIFICANT CHANGE UP (ref 11.5–14.5)
RBC # FLD: 14.1 %
RBC # FLD: 14.1 % — SIGNIFICANT CHANGE UP (ref 11.5–14.5)
RBC # FLD: 14.2 % — SIGNIFICANT CHANGE UP (ref 11.5–14.5)
RBC # FLD: 14.3 % — SIGNIFICANT CHANGE UP (ref 11.5–14.5)
RBC # FLD: 14.6 % — HIGH (ref 11.5–14.5)
RBC # FLD: 14.9 % — HIGH (ref 11.5–14.5)
RBC # FLD: 15.2 %
RBC # FLD: 15.4 %
RBC # FLD: 15.8 %
RBC # FLD: 15.8 % — HIGH (ref 11.5–14.5)
RBC # FLD: 15.9 %
RBC # FLD: 16 % — HIGH (ref 11.5–14.5)
RBC # FLD: 16.1 %
RBC # FLD: 16.1 % — HIGH (ref 11.5–14.5)
RBC # FLD: 16.2 % — HIGH (ref 11.5–14.5)
RBC # FLD: 17.3 %
RBC # FLD: 17.4 % — HIGH (ref 11.5–14.5)
RBC # FLD: 17.5 % — HIGH (ref 11.5–14.5)
RBC # FLD: 18.9 %
RBC BLD AUTO: ABNORMAL
RBC CASTS # UR COMP ASSIST: 0 /HPF — SIGNIFICANT CHANGE UP (ref 0–4)
RBC CASTS # UR COMP ASSIST: 8 /HPF — HIGH (ref 0–4)
S PNEUM AG UR QL: NEGATIVE — SIGNIFICANT CHANGE UP
S PNEUM AG UR QL: NEGATIVE — SIGNIFICANT CHANGE UP
SAO2 % BLDV: 54.3 % — SIGNIFICANT CHANGE UP
SARS-COV-2 RNA SPEC QL NAA+PROBE: SIGNIFICANT CHANGE UP
SODIUM SERPL-SCNC: 128 MMOL/L — LOW (ref 135–146)
SODIUM SERPL-SCNC: 130 MMOL/L — LOW (ref 135–146)
SODIUM SERPL-SCNC: 131 MMOL/L — LOW (ref 135–146)
SODIUM SERPL-SCNC: 131 MMOL/L — LOW (ref 135–146)
SODIUM SERPL-SCNC: 132 MMOL/L — LOW (ref 135–146)
SODIUM SERPL-SCNC: 133 MMOL/L — LOW (ref 135–146)
SODIUM SERPL-SCNC: 134 MMOL/L
SODIUM SERPL-SCNC: 134 MMOL/L
SODIUM SERPL-SCNC: 134 MMOL/L — LOW (ref 135–146)
SODIUM SERPL-SCNC: 135 MMOL/L
SODIUM SERPL-SCNC: 136 MMOL/L
SODIUM SERPL-SCNC: 136 MMOL/L — SIGNIFICANT CHANGE UP (ref 135–146)
SODIUM SERPL-SCNC: 137 MMOL/L
SODIUM SERPL-SCNC: 137 MMOL/L
SODIUM SERPL-SCNC: 137 MMOL/L — SIGNIFICANT CHANGE UP (ref 135–146)
SODIUM SERPL-SCNC: 138 MMOL/L
SODIUM SERPL-SCNC: 139 MMOL/L — SIGNIFICANT CHANGE UP (ref 135–146)
SODIUM SERPL-SCNC: 140 MMOL/L
SODIUM SERPL-SCNC: 140 MMOL/L
SODIUM UR-SCNC: 48 MMOL/L — SIGNIFICANT CHANGE UP
SP GR SPEC: 1.01 — SIGNIFICANT CHANGE UP (ref 1.01–1.03)
SP GR SPEC: 1.02 — SIGNIFICANT CHANGE UP (ref 1.01–1.03)
SP GR SPEC: 1.04 — HIGH (ref 1.01–1.03)
SPECIMEN SOURCE: SIGNIFICANT CHANGE UP
T4 SERPL-MCNC: 7.4 UG/DL
TIBC SERPL-MCNC: 206 UG/DL — LOW (ref 220–430)
TRIGL SERPL-MCNC: 232 MG/DL — HIGH
TROPONIN T SERPL-MCNC: 0.01 NG/ML — SIGNIFICANT CHANGE UP
TROPONIN T SERPL-MCNC: 0.01 NG/ML — SIGNIFICANT CHANGE UP
TROPONIN T SERPL-MCNC: 0.06 NG/ML — CRITICAL HIGH
TROPONIN T SERPL-MCNC: 0.11 NG/ML — CRITICAL HIGH
TROPONIN T SERPL-MCNC: 0.11 NG/ML — CRITICAL HIGH
TROPONIN T SERPL-MCNC: <0.01 NG/ML — SIGNIFICANT CHANGE UP
TROPONIN T SERPL-MCNC: <0.01 NG/ML — SIGNIFICANT CHANGE UP
TSH SERPL-ACNC: 1.97 UIU/ML
TSH SERPL-ACNC: 3.94 UIU/ML
TSH SERPL-ACNC: 6.05 UIU/ML
TSH SERPL-ACNC: 7.56 UIU/ML
TSH SERPL-ACNC: 8.5 UIU/ML
UIBC SERPL-MCNC: 164 UG/DL — SIGNIFICANT CHANGE UP (ref 110–370)
UROBILINOGEN FLD QL: 0.2 MG/DL — SIGNIFICANT CHANGE UP
UROBILINOGEN FLD QL: SIGNIFICANT CHANGE UP
UROBILINOGEN FLD QL: SIGNIFICANT CHANGE UP
VANCOMYCIN TROUGH SERPL-MCNC: 21.3 UG/ML — HIGH (ref 5–10)
VARIANT LYMPHS # BLD: 0.9 % — SIGNIFICANT CHANGE UP (ref 0–5)
VIT B12 SERPL-MCNC: 757 PG/ML — SIGNIFICANT CHANGE UP (ref 232–1245)
WBC # BLD: 11.02 K/UL — HIGH (ref 4.8–10.8)
WBC # BLD: 11.39 K/UL — HIGH (ref 4.8–10.8)
WBC # BLD: 12.33 K/UL — HIGH (ref 4.8–10.8)
WBC # BLD: 12.99 K/UL — HIGH (ref 4.8–10.8)
WBC # BLD: 13.47 K/UL — HIGH (ref 4.8–10.8)
WBC # BLD: 14.19 K/UL — HIGH (ref 4.8–10.8)
WBC # BLD: 15.74 K/UL — HIGH (ref 4.8–10.8)
WBC # BLD: 16.77 K/UL — HIGH (ref 4.8–10.8)
WBC # BLD: 16.91 K/UL — HIGH (ref 4.8–10.8)
WBC # BLD: 17.28 K/UL — HIGH (ref 4.8–10.8)
WBC # BLD: 3.98 K/UL — LOW (ref 4.8–10.8)
WBC # BLD: 5.46 K/UL — SIGNIFICANT CHANGE UP (ref 4.8–10.8)
WBC # BLD: 9.21 K/UL — SIGNIFICANT CHANGE UP (ref 4.8–10.8)
WBC # FLD AUTO: 10.75 K/UL
WBC # FLD AUTO: 11.02 K/UL — HIGH (ref 4.8–10.8)
WBC # FLD AUTO: 11.39 K/UL — HIGH (ref 4.8–10.8)
WBC # FLD AUTO: 11.41 K/UL
WBC # FLD AUTO: 11.93 K/UL
WBC # FLD AUTO: 11.99 K/UL
WBC # FLD AUTO: 12.16 K/UL
WBC # FLD AUTO: 12.33 K/UL — HIGH (ref 4.8–10.8)
WBC # FLD AUTO: 12.99 K/UL — HIGH (ref 4.8–10.8)
WBC # FLD AUTO: 13.47 K/UL — HIGH (ref 4.8–10.8)
WBC # FLD AUTO: 13.48 K/UL
WBC # FLD AUTO: 14.19 K/UL — HIGH (ref 4.8–10.8)
WBC # FLD AUTO: 14.45 K/UL
WBC # FLD AUTO: 15.74 K/UL — HIGH (ref 4.8–10.8)
WBC # FLD AUTO: 16.77 K/UL — HIGH (ref 4.8–10.8)
WBC # FLD AUTO: 16.91 K/UL — HIGH (ref 4.8–10.8)
WBC # FLD AUTO: 17.28 K/UL — HIGH (ref 4.8–10.8)
WBC # FLD AUTO: 21.56 K/UL
WBC # FLD AUTO: 3.98 K/UL — LOW (ref 4.8–10.8)
WBC # FLD AUTO: 5.31 K/UL
WBC # FLD AUTO: 5.46 K/UL — SIGNIFICANT CHANGE UP (ref 4.8–10.8)
WBC # FLD AUTO: 7.58 K/UL
WBC # FLD AUTO: 8.38 K/UL
WBC # FLD AUTO: 8.5 K/UL
WBC # FLD AUTO: 8.68 K/UL
WBC # FLD AUTO: 9.21 K/UL — SIGNIFICANT CHANGE UP (ref 4.8–10.8)
WBC UR QL: 1 /HPF — SIGNIFICANT CHANGE UP (ref 0–5)
WBC UR QL: 2 /HPF — SIGNIFICANT CHANGE UP (ref 0–5)

## 2022-01-01 PROCEDURE — 72125 CT NECK SPINE W/O DYE: CPT | Mod: 26,MA

## 2022-01-01 PROCEDURE — 99285 EMERGENCY DEPT VISIT HI MDM: CPT | Mod: 25

## 2022-01-01 PROCEDURE — 99285 EMERGENCY DEPT VISIT HI MDM: CPT

## 2022-01-01 PROCEDURE — 99232 SBSQ HOSP IP/OBS MODERATE 35: CPT

## 2022-01-01 PROCEDURE — 74177 CT ABD & PELVIS W/CONTRAST: CPT | Mod: 26

## 2022-01-01 PROCEDURE — 99213 OFFICE O/P EST LOW 20 MIN: CPT

## 2022-01-01 PROCEDURE — 71045 X-RAY EXAM CHEST 1 VIEW: CPT | Mod: 26

## 2022-01-01 PROCEDURE — 74177 CT ABD & PELVIS W/CONTRAST: CPT | Mod: 26,MH

## 2022-01-01 PROCEDURE — 71046 X-RAY EXAM CHEST 2 VIEWS: CPT | Mod: 26

## 2022-01-01 PROCEDURE — 93010 ELECTROCARDIOGRAM REPORT: CPT

## 2022-01-01 PROCEDURE — 99214 OFFICE O/P EST MOD 30 MIN: CPT

## 2022-01-01 PROCEDURE — 99239 HOSP IP/OBS DSCHRG MGMT >30: CPT

## 2022-01-01 PROCEDURE — 99223 1ST HOSP IP/OBS HIGH 75: CPT

## 2022-01-01 PROCEDURE — 99222 1ST HOSP IP/OBS MODERATE 55: CPT

## 2022-01-01 PROCEDURE — 71260 CT THORAX DX C+: CPT | Mod: 26,MH

## 2022-01-01 PROCEDURE — 71250 CT THORAX DX C-: CPT | Mod: 26

## 2022-01-01 PROCEDURE — 71260 CT THORAX DX C+: CPT | Mod: 26

## 2022-01-01 PROCEDURE — 74177 CT ABD & PELVIS W/CONTRAST: CPT | Mod: 26,MA

## 2022-01-01 PROCEDURE — 12011 RPR F/E/E/N/L/M 2.5 CM/<: CPT

## 2022-01-01 PROCEDURE — 93010 ELECTROCARDIOGRAM REPORT: CPT | Mod: 77

## 2022-01-01 PROCEDURE — 93970 EXTREMITY STUDY: CPT | Mod: 26

## 2022-01-01 PROCEDURE — 70486 CT MAXILLOFACIAL W/O DYE: CPT | Mod: 26,MA

## 2022-01-01 PROCEDURE — 76937 US GUIDE VASCULAR ACCESS: CPT | Mod: 26

## 2022-01-01 PROCEDURE — 70450 CT HEAD/BRAIN W/O DYE: CPT | Mod: 26,MA

## 2022-01-01 PROCEDURE — 36000 PLACE NEEDLE IN VEIN: CPT

## 2022-01-01 PROCEDURE — 78815 PET IMAGE W/CT SKULL-THIGH: CPT | Mod: 26,PS

## 2022-01-01 PROCEDURE — 72170 X-RAY EXAM OF PELVIS: CPT | Mod: 26

## 2022-01-01 PROCEDURE — 71260 CT THORAX DX C+: CPT | Mod: 26,MA

## 2022-01-01 PROCEDURE — 93306 TTE W/DOPPLER COMPLETE: CPT | Mod: 26

## 2022-01-01 RX ORDER — INSULIN HUMAN 100 [IU]/ML
20 INJECTION, SOLUTION SUBCUTANEOUS ONCE
Refills: 0 | Status: DISCONTINUED | OUTPATIENT
Start: 2022-01-01 | End: 2022-01-01

## 2022-01-01 RX ORDER — SODIUM CHLORIDE 9 MG/ML
1000 INJECTION INTRAMUSCULAR; INTRAVENOUS; SUBCUTANEOUS
Refills: 0 | Status: DISCONTINUED | OUTPATIENT
Start: 2022-01-01 | End: 2022-01-01

## 2022-01-01 RX ORDER — DEXAMETHASONE 0.5 MG/5ML
4 ELIXIR ORAL
Refills: 0 | Status: DISCONTINUED | OUTPATIENT
Start: 2022-01-01 | End: 2022-01-01

## 2022-01-01 RX ORDER — SENNA PLUS 8.6 MG/1
2 TABLET ORAL
Qty: 60 | Refills: 0
Start: 2022-01-01 | End: 2023-01-18

## 2022-01-01 RX ORDER — PREGABALIN 225 MG/1
1000 CAPSULE ORAL ONCE
Refills: 0 | Status: COMPLETED | OUTPATIENT
Start: 2022-01-01 | End: 2022-01-01

## 2022-01-01 RX ORDER — FUROSEMIDE 40 MG
40 TABLET ORAL ONCE
Refills: 0 | Status: COMPLETED | OUTPATIENT
Start: 2022-01-01 | End: 2022-01-01

## 2022-01-01 RX ORDER — METRONIDAZOLE 500 MG
500 TABLET ORAL ONCE
Refills: 0 | Status: COMPLETED | OUTPATIENT
Start: 2022-01-01 | End: 2022-01-01

## 2022-01-01 RX ORDER — CEFEPIME 1 G/1
2000 INJECTION, POWDER, FOR SOLUTION INTRAMUSCULAR; INTRAVENOUS ONCE
Refills: 0 | Status: COMPLETED | OUTPATIENT
Start: 2022-01-01 | End: 2022-01-01

## 2022-01-01 RX ORDER — IPRATROPIUM/ALBUTEROL SULFATE 18-103MCG
3 AEROSOL WITH ADAPTER (GRAM) INHALATION EVERY 6 HOURS
Refills: 0 | Status: DISCONTINUED | OUTPATIENT
Start: 2022-01-01 | End: 2022-01-01

## 2022-01-01 RX ORDER — CEFEPIME 1 G/1
1000 INJECTION, POWDER, FOR SOLUTION INTRAMUSCULAR; INTRAVENOUS EVERY 24 HOURS
Refills: 0 | Status: DISCONTINUED | OUTPATIENT
Start: 2022-01-01 | End: 2022-01-01

## 2022-01-01 RX ORDER — DURVALUMAB 120 MG/2.4ML
1500 INJECTION, SOLUTION INTRAVENOUS ONCE
Refills: 0 | Status: COMPLETED | OUTPATIENT
Start: 2022-01-01 | End: 2022-01-01

## 2022-01-01 RX ORDER — INSULIN LISPRO 100/ML
8 VIAL (ML) SUBCUTANEOUS ONCE
Refills: 0 | Status: COMPLETED | OUTPATIENT
Start: 2022-01-01 | End: 2022-01-01

## 2022-01-01 RX ORDER — INSULIN HUMAN 100 [IU]/ML
10 INJECTION, SOLUTION SUBCUTANEOUS ONCE
Refills: 0 | Status: COMPLETED | OUTPATIENT
Start: 2022-01-01 | End: 2022-01-01

## 2022-01-01 RX ORDER — INSULIN GLARGINE 100 [IU]/ML
42 INJECTION, SOLUTION SUBCUTANEOUS AT BEDTIME
Refills: 0 | Status: DISCONTINUED | OUTPATIENT
Start: 2022-01-01 | End: 2022-01-01

## 2022-01-01 RX ORDER — FOLIC ACID 0.8 MG
1 TABLET ORAL
Qty: 0 | Refills: 0 | DISCHARGE

## 2022-01-01 RX ORDER — VANCOMYCIN HCL 1 G
1250 VIAL (EA) INTRAVENOUS EVERY 12 HOURS
Refills: 0 | Status: DISCONTINUED | OUTPATIENT
Start: 2022-01-01 | End: 2022-01-01

## 2022-01-01 RX ORDER — HEPARIN SODIUM 5000 [USP'U]/ML
5000 INJECTION INTRAVENOUS; SUBCUTANEOUS EVERY 12 HOURS
Refills: 0 | Status: DISCONTINUED | OUTPATIENT
Start: 2022-01-01 | End: 2022-01-01

## 2022-01-01 RX ORDER — INSULIN GLARGINE 100 [IU]/ML
30 INJECTION, SOLUTION SUBCUTANEOUS EVERY MORNING
Refills: 0 | Status: DISCONTINUED | OUTPATIENT
Start: 2022-01-01 | End: 2022-01-01

## 2022-01-01 RX ORDER — GLUCAGON INJECTION, SOLUTION 0.5 MG/.1ML
1 INJECTION, SOLUTION SUBCUTANEOUS ONCE
Refills: 0 | Status: DISCONTINUED | OUTPATIENT
Start: 2022-01-01 | End: 2022-01-01

## 2022-01-01 RX ORDER — LACTULOSE 10 G/15ML
10 SOLUTION ORAL ONCE
Refills: 0 | Status: COMPLETED | OUTPATIENT
Start: 2022-01-01 | End: 2022-01-01

## 2022-01-01 RX ORDER — ACETAMINOPHEN 500 MG
650 TABLET ORAL EVERY 6 HOURS
Refills: 0 | Status: DISCONTINUED | OUTPATIENT
Start: 2022-01-01 | End: 2022-01-01

## 2022-01-01 RX ORDER — INSULIN LISPRO 100/ML
9 VIAL (ML) SUBCUTANEOUS
Refills: 0 | Status: DISCONTINUED | OUTPATIENT
Start: 2022-01-01 | End: 2022-01-01

## 2022-01-01 RX ORDER — INSULIN LISPRO 100/ML
20 VIAL (ML) SUBCUTANEOUS
Refills: 0 | Status: DISCONTINUED | OUTPATIENT
Start: 2022-01-01 | End: 2022-01-01

## 2022-01-01 RX ORDER — SODIUM CHLORIDE 9 MG/ML
1000 INJECTION, SOLUTION INTRAVENOUS
Refills: 0 | Status: DISCONTINUED | OUTPATIENT
Start: 2022-01-01 | End: 2022-01-01

## 2022-01-01 RX ORDER — SODIUM CHLORIDE 9 MG/ML
1000 INJECTION, SOLUTION INTRAVENOUS ONCE
Refills: 0 | Status: COMPLETED | OUTPATIENT
Start: 2022-01-01 | End: 2022-01-01

## 2022-01-01 RX ORDER — LISINOPRIL 2.5 MG/1
5 TABLET ORAL DAILY
Refills: 0 | Status: DISCONTINUED | OUTPATIENT
Start: 2022-01-01 | End: 2022-01-01

## 2022-01-01 RX ORDER — POTASSIUM CHLORIDE 20 MEQ
40 PACKET (EA) ORAL EVERY 4 HOURS
Refills: 0 | Status: COMPLETED | OUTPATIENT
Start: 2022-01-01 | End: 2022-01-01

## 2022-01-01 RX ORDER — METRONIDAZOLE 500 MG
500 TABLET ORAL EVERY 8 HOURS
Refills: 0 | Status: DISCONTINUED | OUTPATIENT
Start: 2022-01-01 | End: 2022-01-01

## 2022-01-01 RX ORDER — CEFEPIME 1 G/1
1000 INJECTION, POWDER, FOR SOLUTION INTRAMUSCULAR; INTRAVENOUS EVERY 8 HOURS
Refills: 0 | Status: DISCONTINUED | OUTPATIENT
Start: 2022-01-01 | End: 2022-01-01

## 2022-01-01 RX ORDER — POTASSIUM CHLORIDE 20 MEQ
20 PACKET (EA) ORAL
Refills: 0 | Status: DISCONTINUED | OUTPATIENT
Start: 2022-01-01 | End: 2022-01-01

## 2022-01-01 RX ORDER — DRONABINOL 2.5 MG
1 CAPSULE ORAL
Qty: 0 | Refills: 0 | DISCHARGE

## 2022-01-01 RX ORDER — DEXAMETHASONE 0.5 MG/5ML
12 ELIXIR ORAL ONCE
Refills: 0 | Status: COMPLETED | OUTPATIENT
Start: 2022-01-01 | End: 2022-01-01

## 2022-01-01 RX ORDER — FOLIC ACID 0.8 MG
1 TABLET ORAL DAILY
Refills: 0 | Status: DISCONTINUED | OUTPATIENT
Start: 2022-01-01 | End: 2022-01-01

## 2022-01-01 RX ORDER — CEFAZOLIN SODIUM 1 G
2000 VIAL (EA) INJECTION ONCE
Refills: 0 | Status: COMPLETED | OUTPATIENT
Start: 2022-01-01 | End: 2022-01-01

## 2022-01-01 RX ORDER — FAMOTIDINE 10 MG/ML
20 INJECTION INTRAVENOUS
Refills: 0 | Status: DISCONTINUED | OUTPATIENT
Start: 2022-01-01 | End: 2022-01-01

## 2022-01-01 RX ORDER — DEXAMETHASONE 4 MG/1
4 TABLET ORAL TWICE DAILY
Qty: 20 | Refills: 1 | Status: ACTIVE | COMMUNITY
Start: 2022-01-01 | End: 1900-01-01

## 2022-01-01 RX ORDER — DRONABINOL 2.5 MG
2.5 CAPSULE ORAL
Refills: 0 | Status: DISCONTINUED | OUTPATIENT
Start: 2022-01-01 | End: 2022-01-01

## 2022-01-01 RX ORDER — INSULIN GLARGINE 100 [IU]/ML
50 INJECTION, SOLUTION SUBCUTANEOUS AT BEDTIME
Refills: 0 | Status: DISCONTINUED | OUTPATIENT
Start: 2022-01-01 | End: 2022-01-01

## 2022-01-01 RX ORDER — PEMETREXED 500 MG/20ML
700 INJECTION, SOLUTION, CONCENTRATE INTRAVENOUS ONCE
Refills: 0 | Status: COMPLETED | OUTPATIENT
Start: 2022-01-01 | End: 2022-01-01

## 2022-01-01 RX ORDER — FOLIC ACID 1 MG/1
1 TABLET ORAL
Qty: 30 | Refills: 3 | Status: ACTIVE | COMMUNITY
Start: 2022-01-01 | End: 1900-01-01

## 2022-01-01 RX ORDER — POLYETHYLENE GLYCOL 3350 17 G/17G
17 POWDER, FOR SOLUTION ORAL DAILY
Refills: 0 | Status: DISCONTINUED | OUTPATIENT
Start: 2022-01-01 | End: 2022-01-01

## 2022-01-01 RX ORDER — ASPIRIN/CALCIUM CARB/MAGNESIUM 324 MG
81 TABLET ORAL DAILY
Refills: 0 | Status: DISCONTINUED | OUTPATIENT
Start: 2022-01-01 | End: 2022-01-01

## 2022-01-01 RX ORDER — AZITHROMYCIN 500 MG/1
500 TABLET, FILM COATED ORAL ONCE
Refills: 0 | Status: COMPLETED | OUTPATIENT
Start: 2022-01-01 | End: 2022-01-01

## 2022-01-01 RX ORDER — ENOXAPARIN SODIUM 100 MG/ML
40 INJECTION SUBCUTANEOUS EVERY 24 HOURS
Refills: 0 | Status: DISCONTINUED | OUTPATIENT
Start: 2022-01-01 | End: 2022-01-01

## 2022-01-01 RX ORDER — ONDANSETRON 8 MG/1
4 TABLET, FILM COATED ORAL EVERY 8 HOURS
Refills: 0 | Status: DISCONTINUED | OUTPATIENT
Start: 2022-01-01 | End: 2022-01-01

## 2022-01-01 RX ORDER — ATORVASTATIN CALCIUM 80 MG/1
40 TABLET, FILM COATED ORAL DAILY
Refills: 0 | Status: DISCONTINUED | OUTPATIENT
Start: 2022-01-01 | End: 2022-01-01

## 2022-01-01 RX ORDER — DEXTROSE 50 % IN WATER 50 %
15 SYRINGE (ML) INTRAVENOUS ONCE
Refills: 0 | Status: DISCONTINUED | OUTPATIENT
Start: 2022-01-01 | End: 2022-01-01

## 2022-01-01 RX ORDER — INSULIN LISPRO 100/ML
12 VIAL (ML) SUBCUTANEOUS
Refills: 0 | Status: DISCONTINUED | OUTPATIENT
Start: 2022-01-01 | End: 2022-01-01

## 2022-01-01 RX ORDER — METOPROLOL TARTRATE 50 MG
5 TABLET ORAL ONCE
Refills: 0 | Status: COMPLETED | OUTPATIENT
Start: 2022-01-01 | End: 2022-01-01

## 2022-01-01 RX ORDER — SODIUM CHLORIDE 9 MG/ML
500 INJECTION, SOLUTION INTRAVENOUS ONCE
Refills: 0 | Status: COMPLETED | OUTPATIENT
Start: 2022-01-01 | End: 2022-01-01

## 2022-01-01 RX ORDER — PEMETREXED 500 MG/20ML
1000 INJECTION, SOLUTION, CONCENTRATE INTRAVENOUS ONCE
Refills: 0 | Status: COMPLETED | OUTPATIENT
Start: 2022-01-01 | End: 2022-01-01

## 2022-01-01 RX ORDER — DEXAMETHASONE 0.5 MG/5ML
1 ELIXIR ORAL
Qty: 0 | Refills: 0 | DISCHARGE

## 2022-01-01 RX ORDER — PROCHLORPERAZINE MALEATE 5 MG
1 TABLET ORAL
Qty: 0 | Refills: 0 | DISCHARGE

## 2022-01-01 RX ORDER — INSULIN GLARGINE 100 [IU]/ML
20 INJECTION, SOLUTION SUBCUTANEOUS EVERY MORNING
Refills: 0 | Status: DISCONTINUED | OUTPATIENT
Start: 2022-01-01 | End: 2022-01-01

## 2022-01-01 RX ORDER — INSULIN LISPRO 100/ML
VIAL (ML) SUBCUTANEOUS
Refills: 0 | Status: DISCONTINUED | OUTPATIENT
Start: 2022-01-01 | End: 2022-01-01

## 2022-01-01 RX ORDER — INSULIN LISPRO 100/ML
6 VIAL (ML) SUBCUTANEOUS ONCE
Refills: 0 | Status: COMPLETED | OUTPATIENT
Start: 2022-01-01 | End: 2022-01-01

## 2022-01-01 RX ORDER — POLYETHYLENE GLYCOL 3350 17 G/17G
17 POWDER, FOR SOLUTION ORAL
Qty: 1020 | Refills: 0
Start: 2022-01-01 | End: 2023-01-18

## 2022-01-01 RX ORDER — METOPROLOL TARTRATE 50 MG
1 TABLET ORAL
Qty: 0 | Refills: 0 | DISCHARGE

## 2022-01-01 RX ORDER — NYSTATIN 100000 [USP'U]/ML
100000 SUSPENSION ORAL 4 TIMES DAILY
Qty: 80 | Refills: 1 | Status: ACTIVE | COMMUNITY
Start: 2022-01-01 | End: 1900-01-01

## 2022-01-01 RX ORDER — DEXTROSE 50 % IN WATER 50 %
25 SYRINGE (ML) INTRAVENOUS ONCE
Refills: 0 | Status: DISCONTINUED | OUTPATIENT
Start: 2022-01-01 | End: 2022-01-01

## 2022-01-01 RX ORDER — INSULIN GLARGINE 100 [IU]/ML
40 INJECTION, SOLUTION SUBCUTANEOUS ONCE
Refills: 0 | Status: COMPLETED | OUTPATIENT
Start: 2022-01-01 | End: 2022-01-01

## 2022-01-01 RX ORDER — INSULIN LISPRO 100/ML
10 VIAL (ML) SUBCUTANEOUS
Refills: 0 | Status: DISCONTINUED | OUTPATIENT
Start: 2022-01-01 | End: 2022-01-01

## 2022-01-01 RX ORDER — CHOLESTYRAMINE 4 G/9G
4 POWDER, FOR SUSPENSION ORAL THREE TIMES A DAY
Refills: 0 | Status: DISCONTINUED | OUTPATIENT
Start: 2022-01-01 | End: 2022-01-01

## 2022-01-01 RX ORDER — POLYETHYLENE GLYCOL 3350 17 G/17G
17 POWDER, FOR SOLUTION ORAL ONCE
Refills: 0 | Status: COMPLETED | OUTPATIENT
Start: 2022-01-01 | End: 2022-01-01

## 2022-01-01 RX ORDER — IPRATROPIUM/ALBUTEROL SULFATE 18-103MCG
3 AEROSOL WITH ADAPTER (GRAM) INHALATION EVERY 8 HOURS
Refills: 0 | Status: DISCONTINUED | OUTPATIENT
Start: 2022-01-01 | End: 2022-01-01

## 2022-01-01 RX ORDER — FUROSEMIDE 40 MG
40 TABLET ORAL DAILY
Refills: 0 | Status: DISCONTINUED | OUTPATIENT
Start: 2022-01-01 | End: 2022-01-01

## 2022-01-01 RX ORDER — MONTELUKAST 4 MG/1
10 TABLET, CHEWABLE ORAL DAILY
Refills: 0 | Status: DISCONTINUED | OUTPATIENT
Start: 2022-01-01 | End: 2022-01-01

## 2022-01-01 RX ORDER — INSULIN GLARGINE 100 [IU]/ML
60 INJECTION, SOLUTION SUBCUTANEOUS AT BEDTIME
Refills: 0 | Status: DISCONTINUED | OUTPATIENT
Start: 2022-01-01 | End: 2022-01-01

## 2022-01-01 RX ORDER — ATORVASTATIN CALCIUM 80 MG/1
40 TABLET, FILM COATED ORAL AT BEDTIME
Refills: 0 | Status: DISCONTINUED | OUTPATIENT
Start: 2022-01-01 | End: 2022-01-01

## 2022-01-01 RX ORDER — DEXTROSE 50 % IN WATER 50 %
12.5 SYRINGE (ML) INTRAVENOUS ONCE
Refills: 0 | Status: DISCONTINUED | OUTPATIENT
Start: 2022-01-01 | End: 2022-01-01

## 2022-01-01 RX ORDER — CEFEPIME 1 G/1
2000 INJECTION, POWDER, FOR SOLUTION INTRAMUSCULAR; INTRAVENOUS EVERY 8 HOURS
Refills: 0 | Status: DISCONTINUED | OUTPATIENT
Start: 2022-01-01 | End: 2022-01-01

## 2022-01-01 RX ORDER — INSULIN HUMAN 100 [IU]/ML
20 INJECTION, SOLUTION SUBCUTANEOUS ONCE
Refills: 0 | Status: COMPLETED | OUTPATIENT
Start: 2022-01-01 | End: 2022-01-01

## 2022-01-01 RX ORDER — SODIUM CHLORIDE 9 MG/ML
1000 INJECTION INTRAMUSCULAR; INTRAVENOUS; SUBCUTANEOUS ONCE
Refills: 0 | Status: COMPLETED | OUTPATIENT
Start: 2022-01-01 | End: 2022-01-01

## 2022-01-01 RX ORDER — LANOLIN ALCOHOL/MO/W.PET/CERES
3 CREAM (GRAM) TOPICAL AT BEDTIME
Refills: 0 | Status: DISCONTINUED | OUTPATIENT
Start: 2022-01-01 | End: 2022-01-01

## 2022-01-01 RX ORDER — CEFPODOXIME PROXETIL 100 MG
1 TABLET ORAL
Qty: 8 | Refills: 0
Start: 2022-01-01 | End: 2022-01-01

## 2022-01-01 RX ORDER — GUAIFENESIN/DEXTROMETHORPHAN 600MG-30MG
10 TABLET, EXTENDED RELEASE 12 HR ORAL EVERY 6 HOURS
Refills: 0 | Status: DISCONTINUED | OUTPATIENT
Start: 2022-01-01 | End: 2022-01-01

## 2022-01-01 RX ORDER — MAGNESIUM SULFATE 500 MG/ML
1 VIAL (ML) INJECTION ONCE
Refills: 0 | Status: COMPLETED | OUTPATIENT
Start: 2022-01-01 | End: 2022-01-01

## 2022-01-01 RX ORDER — NITROFURANTOIN MACROCRYSTALS 100 MG/1
100 CAPSULE ORAL TWICE DAILY
Qty: 10 | Refills: 0 | Status: ACTIVE | COMMUNITY
Start: 2022-01-01 | End: 1900-01-01

## 2022-01-01 RX ORDER — TOPICAL ANESTHETIC 200 MG/G
20 SPRAY DENTAL; PERIODONTAL
Qty: 1 | Refills: 0 | Status: ACTIVE | COMMUNITY
Start: 2022-01-01 | End: 1900-01-01

## 2022-01-01 RX ORDER — METRONIDAZOLE 500 MG
TABLET ORAL
Refills: 0 | Status: DISCONTINUED | OUTPATIENT
Start: 2022-01-01 | End: 2022-01-01

## 2022-01-01 RX ORDER — POLYETHYLENE GLYCOL 3350 17 G/17G
17 POWDER, FOR SOLUTION ORAL
Refills: 0 | Status: DISCONTINUED | OUTPATIENT
Start: 2022-01-01 | End: 2022-01-01

## 2022-01-01 RX ORDER — INSULIN LISPRO 100/ML
5 VIAL (ML) SUBCUTANEOUS
Refills: 0 | Status: DISCONTINUED | OUTPATIENT
Start: 2022-01-01 | End: 2022-01-01

## 2022-01-01 RX ORDER — SENNA PLUS 8.6 MG/1
2 TABLET ORAL AT BEDTIME
Refills: 0 | Status: DISCONTINUED | OUTPATIENT
Start: 2022-01-01 | End: 2022-01-01

## 2022-01-01 RX ORDER — NITROFURANTOIN MACROCRYSTAL 50 MG
1 CAPSULE ORAL
Qty: 0 | Refills: 0 | DISCHARGE
Start: 2022-01-01

## 2022-01-01 RX ORDER — VANCOMYCIN HCL 1 G
1000 VIAL (EA) INTRAVENOUS EVERY 12 HOURS
Refills: 0 | Status: DISCONTINUED | OUTPATIENT
Start: 2022-01-01 | End: 2022-01-01

## 2022-01-01 RX ORDER — SODIUM CHLORIDE 9 MG/ML
1000 INJECTION INTRAMUSCULAR; INTRAVENOUS; SUBCUTANEOUS
Refills: 0 | Status: COMPLETED | OUTPATIENT
Start: 2022-01-01 | End: 2022-01-01

## 2022-01-01 RX ORDER — ACETAMINOPHEN 500 MG
975 TABLET ORAL ONCE
Refills: 0 | Status: COMPLETED | OUTPATIENT
Start: 2022-01-01 | End: 2022-01-01

## 2022-01-01 RX ORDER — INSULIN GLARGINE 100 [IU]/ML
10 INJECTION, SOLUTION SUBCUTANEOUS AT BEDTIME
Refills: 0 | Status: DISCONTINUED | OUTPATIENT
Start: 2022-01-01 | End: 2022-01-01

## 2022-01-01 RX ORDER — METOPROLOL TARTRATE 50 MG
25 TABLET ORAL DAILY
Refills: 0 | Status: DISCONTINUED | OUTPATIENT
Start: 2022-01-01 | End: 2022-01-01

## 2022-01-01 RX ORDER — MECLIZINE HCL 12.5 MG
1 TABLET ORAL
Qty: 90 | Refills: 0
Start: 2022-01-01 | End: 2022-01-01

## 2022-01-01 RX ORDER — TRAMADOL HYDROCHLORIDE 50 MG/1
1 TABLET ORAL
Qty: 0 | Refills: 0 | DISCHARGE
Start: 2022-01-01

## 2022-01-01 RX ORDER — ADENOSINE 3 MG/ML
6 INJECTION INTRAVENOUS ONCE
Refills: 0 | Status: DISCONTINUED | OUTPATIENT
Start: 2022-01-01 | End: 2022-01-01

## 2022-01-01 RX ORDER — PEMETREXED 500 MG/20ML
800 INJECTION, SOLUTION, CONCENTRATE INTRAVENOUS ONCE
Refills: 0 | Status: COMPLETED | OUTPATIENT
Start: 2022-01-01 | End: 2022-01-01

## 2022-01-01 RX ORDER — HYDROMORPHONE HYDROCHLORIDE 2 MG/ML
0.5 INJECTION INTRAMUSCULAR; INTRAVENOUS; SUBCUTANEOUS ONCE
Refills: 0 | Status: DISCONTINUED | OUTPATIENT
Start: 2022-01-01 | End: 2022-01-01

## 2022-01-01 RX ORDER — TRAMADOL HYDROCHLORIDE 50 MG/1
50 TABLET, COATED ORAL EVERY 6 HOURS
Qty: 60 | Refills: 0 | Status: ACTIVE | COMMUNITY
Start: 2022-01-01 | End: 1900-01-01

## 2022-01-01 RX ORDER — CEFEPIME 1 G/1
2000 INJECTION, POWDER, FOR SOLUTION INTRAMUSCULAR; INTRAVENOUS EVERY 12 HOURS
Refills: 0 | Status: DISCONTINUED | OUTPATIENT
Start: 2022-01-01 | End: 2022-01-01

## 2022-01-01 RX ORDER — MECLIZINE HCL 12.5 MG
25 TABLET ORAL EVERY 8 HOURS
Refills: 0 | Status: DISCONTINUED | OUTPATIENT
Start: 2022-01-01 | End: 2022-01-01

## 2022-01-01 RX ORDER — ONDANSETRON 8 MG/1
1 TABLET, FILM COATED ORAL
Qty: 0 | Refills: 0 | DISCHARGE

## 2022-01-01 RX ORDER — INSULIN LISPRO 100/ML
5 VIAL (ML) SUBCUTANEOUS ONCE
Refills: 0 | Status: COMPLETED | OUTPATIENT
Start: 2022-01-01 | End: 2022-01-01

## 2022-01-01 RX ADMIN — Medication 100 MILLIGRAM(S): at 13:29

## 2022-01-01 RX ADMIN — Medication 25 MILLIGRAM(S): at 05:16

## 2022-01-01 RX ADMIN — Medication 8 UNIT(S): at 09:02

## 2022-01-01 RX ADMIN — Medication 166.67 MILLIGRAM(S): at 06:32

## 2022-01-01 RX ADMIN — Medication 81 MILLIGRAM(S): at 12:37

## 2022-01-01 RX ADMIN — CEFEPIME 100 MILLIGRAM(S): 1 INJECTION, POWDER, FOR SOLUTION INTRAMUSCULAR; INTRAVENOUS at 06:20

## 2022-01-01 RX ADMIN — Medication 10 MILLILITER(S): at 23:51

## 2022-01-01 RX ADMIN — INSULIN GLARGINE 40 UNIT(S): 100 INJECTION, SOLUTION SUBCUTANEOUS at 11:52

## 2022-01-01 RX ADMIN — Medication 5 UNIT(S): at 08:13

## 2022-01-01 RX ADMIN — Medication 4 MILLIGRAM(S): at 17:28

## 2022-01-01 RX ADMIN — Medication 25 MILLIGRAM(S): at 06:16

## 2022-01-01 RX ADMIN — INSULIN GLARGINE 20 UNIT(S): 100 INJECTION, SOLUTION SUBCUTANEOUS at 09:23

## 2022-01-01 RX ADMIN — Medication 10 UNIT(S): at 11:53

## 2022-01-01 RX ADMIN — Medication 40 MILLIEQUIVALENT(S): at 23:51

## 2022-01-01 RX ADMIN — Medication 100 MILLIGRAM(S): at 17:07

## 2022-01-01 RX ADMIN — Medication 40 MILLIGRAM(S): at 08:09

## 2022-01-01 RX ADMIN — POLYETHYLENE GLYCOL 3350 17 GRAM(S): 17 POWDER, FOR SOLUTION ORAL at 11:26

## 2022-01-01 RX ADMIN — Medication 2.5 MILLIGRAM(S): at 05:16

## 2022-01-01 RX ADMIN — DURVALUMAB 280 MILLIGRAM(S): 120 INJECTION, SOLUTION INTRAVENOUS at 15:23

## 2022-01-01 RX ADMIN — Medication 81 MILLIGRAM(S): at 12:22

## 2022-01-01 RX ADMIN — Medication 1 MILLIGRAM(S): at 12:37

## 2022-01-01 RX ADMIN — Medication 10 UNIT(S): at 09:54

## 2022-01-01 RX ADMIN — SODIUM CHLORIDE 1000 MILLILITER(S): 9 INJECTION, SOLUTION INTRAVENOUS at 19:25

## 2022-01-01 RX ADMIN — Medication 81 MILLIGRAM(S): at 14:51

## 2022-01-01 RX ADMIN — ENOXAPARIN SODIUM 40 MILLIGRAM(S): 100 INJECTION SUBCUTANEOUS at 08:09

## 2022-01-01 RX ADMIN — CEFEPIME 100 MILLIGRAM(S): 1 INJECTION, POWDER, FOR SOLUTION INTRAMUSCULAR; INTRAVENOUS at 21:05

## 2022-01-01 RX ADMIN — LISINOPRIL 5 MILLIGRAM(S): 2.5 TABLET ORAL at 06:07

## 2022-01-01 RX ADMIN — POLYETHYLENE GLYCOL 3350 17 GRAM(S): 17 POWDER, FOR SOLUTION ORAL at 11:36

## 2022-01-01 RX ADMIN — PEMETREXED 700 MILLIGRAM(S): 500 INJECTION, SOLUTION, CONCENTRATE INTRAVENOUS at 17:50

## 2022-01-01 RX ADMIN — Medication 4 MILLIGRAM(S): at 05:52

## 2022-01-01 RX ADMIN — Medication 3 MILLILITER(S): at 11:32

## 2022-01-01 RX ADMIN — Medication 2: at 18:07

## 2022-01-01 RX ADMIN — Medication 100 MILLIGRAM(S): at 06:20

## 2022-01-01 RX ADMIN — FAMOTIDINE 20 MILLIGRAM(S): 10 INJECTION INTRAVENOUS at 06:20

## 2022-01-01 RX ADMIN — ATORVASTATIN CALCIUM 40 MILLIGRAM(S): 80 TABLET, FILM COATED ORAL at 11:16

## 2022-01-01 RX ADMIN — Medication 40 MILLIEQUIVALENT(S): at 17:08

## 2022-01-01 RX ADMIN — SODIUM CHLORIDE 500 MILLILITER(S): 9 INJECTION, SOLUTION INTRAVENOUS at 18:40

## 2022-01-01 RX ADMIN — ENOXAPARIN SODIUM 40 MILLIGRAM(S): 100 INJECTION SUBCUTANEOUS at 09:25

## 2022-01-01 RX ADMIN — CEFEPIME 100 MILLIGRAM(S): 1 INJECTION, POWDER, FOR SOLUTION INTRAMUSCULAR; INTRAVENOUS at 21:17

## 2022-01-01 RX ADMIN — SODIUM CHLORIDE 100 MILLILITER(S): 9 INJECTION, SOLUTION INTRAVENOUS at 06:42

## 2022-01-01 RX ADMIN — PREGABALIN 1000 MICROGRAM(S): 225 CAPSULE ORAL at 17:35

## 2022-01-01 RX ADMIN — Medication 2.5 MILLIGRAM(S): at 05:38

## 2022-01-01 RX ADMIN — Medication 100 MILLIGRAM(S): at 06:17

## 2022-01-01 RX ADMIN — Medication 250 MILLIGRAM(S): at 06:53

## 2022-01-01 RX ADMIN — SODIUM CHLORIDE 500 MILLILITER(S): 9 INJECTION INTRAMUSCULAR; INTRAVENOUS; SUBCUTANEOUS at 18:01

## 2022-01-01 RX ADMIN — CEFEPIME 100 MILLIGRAM(S): 1 INJECTION, POWDER, FOR SOLUTION INTRAMUSCULAR; INTRAVENOUS at 17:25

## 2022-01-01 RX ADMIN — Medication 250 MILLIGRAM(S): at 17:39

## 2022-01-01 RX ADMIN — Medication 100 MILLIGRAM(S): at 02:51

## 2022-01-01 RX ADMIN — CEFEPIME 100 MILLIGRAM(S): 1 INJECTION, POWDER, FOR SOLUTION INTRAMUSCULAR; INTRAVENOUS at 06:16

## 2022-01-01 RX ADMIN — Medication 1 MILLIGRAM(S): at 11:16

## 2022-01-01 RX ADMIN — CEFEPIME 100 MILLIGRAM(S): 1 INJECTION, POWDER, FOR SOLUTION INTRAMUSCULAR; INTRAVENOUS at 21:48

## 2022-01-01 RX ADMIN — Medication 975 MILLIGRAM(S): at 16:54

## 2022-01-01 RX ADMIN — Medication 10 UNIT(S): at 17:24

## 2022-01-01 RX ADMIN — Medication 10 MILLILITER(S): at 05:50

## 2022-01-01 RX ADMIN — HYDROMORPHONE HYDROCHLORIDE 0.5 MILLIGRAM(S): 2 INJECTION INTRAMUSCULAR; INTRAVENOUS; SUBCUTANEOUS at 04:11

## 2022-01-01 RX ADMIN — Medication 1 MILLIGRAM(S): at 12:22

## 2022-01-01 RX ADMIN — Medication 12 UNIT(S): at 17:24

## 2022-01-01 RX ADMIN — Medication 100 MILLIGRAM(S): at 13:42

## 2022-01-01 RX ADMIN — PEMETREXED 700 MILLIGRAM(S): 500 INJECTION, SOLUTION, CONCENTRATE INTRAVENOUS at 15:59

## 2022-01-01 RX ADMIN — Medication 100 MILLIGRAM(S): at 22:40

## 2022-01-01 RX ADMIN — Medication 166.67 MILLIGRAM(S): at 17:29

## 2022-01-01 RX ADMIN — Medication 2: at 08:25

## 2022-01-01 RX ADMIN — CEFEPIME 100 MILLIGRAM(S): 1 INJECTION, POWDER, FOR SOLUTION INTRAMUSCULAR; INTRAVENOUS at 06:40

## 2022-01-01 RX ADMIN — CEFEPIME 100 MILLIGRAM(S): 1 INJECTION, POWDER, FOR SOLUTION INTRAMUSCULAR; INTRAVENOUS at 06:19

## 2022-01-01 RX ADMIN — PREGABALIN 1000 MICROGRAM(S): 225 CAPSULE ORAL at 15:17

## 2022-01-01 RX ADMIN — ENOXAPARIN SODIUM 40 MILLIGRAM(S): 100 INJECTION SUBCUTANEOUS at 21:44

## 2022-01-01 RX ADMIN — LISINOPRIL 5 MILLIGRAM(S): 2.5 TABLET ORAL at 06:20

## 2022-01-01 RX ADMIN — Medication 100 MILLIGRAM(S): at 22:34

## 2022-01-01 RX ADMIN — ENOXAPARIN SODIUM 40 MILLIGRAM(S): 100 INJECTION SUBCUTANEOUS at 08:12

## 2022-01-01 RX ADMIN — Medication 9 UNIT(S): at 13:11

## 2022-01-01 RX ADMIN — Medication 10 MILLILITER(S): at 00:00

## 2022-01-01 RX ADMIN — CEFEPIME 100 MILLIGRAM(S): 1 INJECTION, POWDER, FOR SOLUTION INTRAMUSCULAR; INTRAVENOUS at 16:39

## 2022-01-01 RX ADMIN — Medication 1 ENEMA: at 11:41

## 2022-01-01 RX ADMIN — Medication 100 MILLIGRAM(S): at 21:04

## 2022-01-01 RX ADMIN — PEMETREXED 396 MILLIGRAM(S): 500 INJECTION, SOLUTION, CONCENTRATE INTRAVENOUS at 14:55

## 2022-01-01 RX ADMIN — Medication 6 UNIT(S): at 21:15

## 2022-01-01 RX ADMIN — CEFEPIME 100 MILLIGRAM(S): 1 INJECTION, POWDER, FOR SOLUTION INTRAMUSCULAR; INTRAVENOUS at 14:29

## 2022-01-01 RX ADMIN — INSULIN HUMAN 10 UNIT(S): 100 INJECTION, SOLUTION SUBCUTANEOUS at 09:11

## 2022-01-01 RX ADMIN — Medication 5 MILLIGRAM(S): at 02:21

## 2022-01-01 RX ADMIN — INSULIN HUMAN 10 UNIT(S): 100 INJECTION, SOLUTION SUBCUTANEOUS at 11:47

## 2022-01-01 RX ADMIN — POLYETHYLENE GLYCOL 3350 17 GRAM(S): 17 POWDER, FOR SOLUTION ORAL at 12:46

## 2022-01-01 RX ADMIN — Medication 10 MILLILITER(S): at 11:14

## 2022-01-01 RX ADMIN — Medication 4: at 17:39

## 2022-01-01 RX ADMIN — Medication 1: at 17:22

## 2022-01-01 RX ADMIN — SODIUM CHLORIDE 500 MILLILITER(S): 9 INJECTION INTRAMUSCULAR; INTRAVENOUS; SUBCUTANEOUS at 17:36

## 2022-01-01 RX ADMIN — Medication 5 MILLIGRAM(S): at 11:34

## 2022-01-01 RX ADMIN — CEFEPIME 100 MILLIGRAM(S): 1 INJECTION, POWDER, FOR SOLUTION INTRAMUSCULAR; INTRAVENOUS at 17:45

## 2022-01-01 RX ADMIN — DURVALUMAB 280 MILLIGRAM(S): 120 INJECTION, SOLUTION INTRAVENOUS at 16:05

## 2022-01-01 RX ADMIN — ATORVASTATIN CALCIUM 40 MILLIGRAM(S): 80 TABLET, FILM COATED ORAL at 11:36

## 2022-01-01 RX ADMIN — Medication 5 UNIT(S): at 13:08

## 2022-01-01 RX ADMIN — LISINOPRIL 5 MILLIGRAM(S): 2.5 TABLET ORAL at 05:50

## 2022-01-01 RX ADMIN — LACTULOSE 10 GRAM(S): 10 SOLUTION ORAL at 11:15

## 2022-01-01 RX ADMIN — Medication 25 MILLIGRAM(S): at 05:53

## 2022-01-01 RX ADMIN — HEPARIN SODIUM 5000 UNIT(S): 5000 INJECTION INTRAVENOUS; SUBCUTANEOUS at 06:20

## 2022-01-01 RX ADMIN — Medication 100 MILLIGRAM(S): at 13:35

## 2022-01-01 RX ADMIN — Medication 122 MILLIGRAM(S): at 17:35

## 2022-01-01 RX ADMIN — Medication 4 MILLIGRAM(S): at 06:19

## 2022-01-01 RX ADMIN — ENOXAPARIN SODIUM 40 MILLIGRAM(S): 100 INJECTION SUBCUTANEOUS at 08:29

## 2022-01-01 RX ADMIN — CEFEPIME 100 MILLIGRAM(S): 1 INJECTION, POWDER, FOR SOLUTION INTRAMUSCULAR; INTRAVENOUS at 18:40

## 2022-01-01 RX ADMIN — Medication 12: at 13:10

## 2022-01-01 RX ADMIN — Medication 40 MILLIGRAM(S): at 05:52

## 2022-01-01 RX ADMIN — CHOLESTYRAMINE 4 GRAM(S): 4 POWDER, FOR SUSPENSION ORAL at 14:53

## 2022-01-01 RX ADMIN — Medication 12: at 10:27

## 2022-01-01 RX ADMIN — CEFEPIME 100 MILLIGRAM(S): 1 INJECTION, POWDER, FOR SOLUTION INTRAMUSCULAR; INTRAVENOUS at 05:52

## 2022-01-01 RX ADMIN — Medication 81 MILLIGRAM(S): at 11:35

## 2022-01-01 RX ADMIN — CEFEPIME 100 MILLIGRAM(S): 1 INJECTION, POWDER, FOR SOLUTION INTRAMUSCULAR; INTRAVENOUS at 13:35

## 2022-01-01 RX ADMIN — Medication 10 MILLILITER(S): at 13:09

## 2022-01-01 RX ADMIN — INSULIN HUMAN 10 UNIT(S): 100 INJECTION, SOLUTION SUBCUTANEOUS at 12:13

## 2022-01-01 RX ADMIN — Medication 2.5 MILLIGRAM(S): at 18:05

## 2022-01-01 RX ADMIN — Medication 122 MILLIGRAM(S): at 14:32

## 2022-01-01 RX ADMIN — Medication 10 MILLILITER(S): at 12:01

## 2022-01-01 RX ADMIN — AZITHROMYCIN 255 MILLIGRAM(S): 500 TABLET, FILM COATED ORAL at 19:25

## 2022-01-01 RX ADMIN — Medication 10 UNIT(S): at 12:36

## 2022-01-01 RX ADMIN — SODIUM CHLORIDE 1000 MILLILITER(S): 9 INJECTION INTRAMUSCULAR; INTRAVENOUS; SUBCUTANEOUS at 18:05

## 2022-01-01 RX ADMIN — Medication 12: at 13:08

## 2022-01-01 RX ADMIN — Medication 81 MILLIGRAM(S): at 11:52

## 2022-01-01 RX ADMIN — Medication 1 MILLIGRAM(S): at 11:52

## 2022-01-01 RX ADMIN — Medication 3 MILLILITER(S): at 15:01

## 2022-01-01 RX ADMIN — Medication 4 MILLIGRAM(S): at 05:51

## 2022-01-01 RX ADMIN — Medication 40 MILLIGRAM(S): at 01:42

## 2022-01-01 RX ADMIN — Medication 10 MILLILITER(S): at 12:35

## 2022-01-01 RX ADMIN — ATORVASTATIN CALCIUM 40 MILLIGRAM(S): 80 TABLET, FILM COATED ORAL at 21:43

## 2022-01-01 RX ADMIN — Medication 122 MILLIGRAM(S): at 15:17

## 2022-01-01 RX ADMIN — Medication 40 MILLIEQUIVALENT(S): at 21:16

## 2022-01-01 RX ADMIN — Medication 12: at 12:12

## 2022-01-01 RX ADMIN — Medication 1 MILLIGRAM(S): at 13:10

## 2022-01-01 RX ADMIN — LISINOPRIL 5 MILLIGRAM(S): 2.5 TABLET ORAL at 06:19

## 2022-01-01 RX ADMIN — Medication 166.67 MILLIGRAM(S): at 06:20

## 2022-01-01 RX ADMIN — Medication 25 MILLIGRAM(S): at 05:50

## 2022-01-01 RX ADMIN — LISINOPRIL 5 MILLIGRAM(S): 2.5 TABLET ORAL at 06:16

## 2022-01-01 RX ADMIN — Medication 122 MILLIGRAM(S): at 15:43

## 2022-01-01 RX ADMIN — ATORVASTATIN CALCIUM 40 MILLIGRAM(S): 80 TABLET, FILM COATED ORAL at 12:38

## 2022-01-01 RX ADMIN — CEFEPIME 100 MILLIGRAM(S): 1 INJECTION, POWDER, FOR SOLUTION INTRAMUSCULAR; INTRAVENOUS at 22:05

## 2022-01-01 RX ADMIN — Medication 12: at 17:24

## 2022-01-01 RX ADMIN — Medication 81 MILLIGRAM(S): at 12:21

## 2022-01-01 RX ADMIN — Medication 1: at 08:16

## 2022-01-01 RX ADMIN — Medication 100 MILLIGRAM(S): at 05:43

## 2022-01-01 RX ADMIN — Medication 5 MILLIGRAM(S): at 01:26

## 2022-01-01 RX ADMIN — INSULIN GLARGINE 60 UNIT(S): 100 INJECTION, SOLUTION SUBCUTANEOUS at 22:41

## 2022-01-01 RX ADMIN — SODIUM CHLORIDE 1000 MILLILITER(S): 9 INJECTION INTRAMUSCULAR; INTRAVENOUS; SUBCUTANEOUS at 11:33

## 2022-01-01 RX ADMIN — ATORVASTATIN CALCIUM 40 MILLIGRAM(S): 80 TABLET, FILM COATED ORAL at 22:13

## 2022-01-01 RX ADMIN — DURVALUMAB 280 MILLIGRAM(S): 120 INJECTION, SOLUTION INTRAVENOUS at 13:43

## 2022-01-01 RX ADMIN — Medication 10 MILLILITER(S): at 22:25

## 2022-01-01 RX ADMIN — Medication 81 MILLIGRAM(S): at 12:35

## 2022-01-01 RX ADMIN — Medication 975 MILLIGRAM(S): at 17:30

## 2022-01-01 RX ADMIN — Medication 10 MILLILITER(S): at 17:41

## 2022-01-01 RX ADMIN — Medication 8 UNIT(S): at 23:52

## 2022-01-01 RX ADMIN — Medication 4: at 08:28

## 2022-01-01 RX ADMIN — Medication 4 MILLIGRAM(S): at 21:17

## 2022-01-01 RX ADMIN — Medication 25 MILLIGRAM(S): at 06:41

## 2022-01-01 RX ADMIN — Medication 10 MILLILITER(S): at 12:37

## 2022-01-01 RX ADMIN — Medication 1 MILLIGRAM(S): at 12:35

## 2022-01-01 RX ADMIN — POLYETHYLENE GLYCOL 3350 17 GRAM(S): 17 POWDER, FOR SOLUTION ORAL at 02:59

## 2022-01-01 RX ADMIN — Medication 81 MILLIGRAM(S): at 11:15

## 2022-01-01 RX ADMIN — CEFEPIME 100 MILLIGRAM(S): 1 INJECTION, POWDER, FOR SOLUTION INTRAMUSCULAR; INTRAVENOUS at 00:08

## 2022-01-01 RX ADMIN — ENOXAPARIN SODIUM 40 MILLIGRAM(S): 100 INJECTION SUBCUTANEOUS at 21:59

## 2022-01-01 RX ADMIN — Medication 100 MILLIGRAM(S): at 13:09

## 2022-01-01 RX ADMIN — POLYETHYLENE GLYCOL 3350 17 GRAM(S): 17 POWDER, FOR SOLUTION ORAL at 10:29

## 2022-01-01 RX ADMIN — Medication 25 MILLIGRAM(S): at 06:19

## 2022-01-01 RX ADMIN — ATORVASTATIN CALCIUM 40 MILLIGRAM(S): 80 TABLET, FILM COATED ORAL at 21:59

## 2022-01-01 RX ADMIN — Medication 250 MILLIGRAM(S): at 18:06

## 2022-01-01 RX ADMIN — Medication 2: at 12:21

## 2022-01-01 RX ADMIN — Medication 1 MILLIGRAM(S): at 11:33

## 2022-01-01 RX ADMIN — INSULIN HUMAN 20 UNIT(S): 100 INJECTION, SOLUTION SUBCUTANEOUS at 16:31

## 2022-01-01 RX ADMIN — LISINOPRIL 5 MILLIGRAM(S): 2.5 TABLET ORAL at 05:37

## 2022-01-01 RX ADMIN — ENOXAPARIN SODIUM 40 MILLIGRAM(S): 100 INJECTION SUBCUTANEOUS at 11:41

## 2022-01-01 RX ADMIN — Medication 10 MILLILITER(S): at 05:53

## 2022-01-01 RX ADMIN — Medication 81 MILLIGRAM(S): at 13:10

## 2022-01-01 RX ADMIN — SODIUM CHLORIDE 500 MILLILITER(S): 9 INJECTION, SOLUTION INTRAVENOUS at 17:52

## 2022-01-01 RX ADMIN — Medication 12: at 08:13

## 2022-01-01 RX ADMIN — Medication 12: at 17:11

## 2022-01-01 RX ADMIN — SODIUM CHLORIDE 75 MILLILITER(S): 9 INJECTION, SOLUTION INTRAVENOUS at 13:18

## 2022-01-01 RX ADMIN — MONTELUKAST 10 MILLIGRAM(S): 4 TABLET, CHEWABLE ORAL at 14:51

## 2022-01-01 RX ADMIN — LISINOPRIL 5 MILLIGRAM(S): 2.5 TABLET ORAL at 06:41

## 2022-01-01 RX ADMIN — LISINOPRIL 5 MILLIGRAM(S): 2.5 TABLET ORAL at 05:20

## 2022-01-01 RX ADMIN — Medication 100 MILLIGRAM(S): at 13:25

## 2022-01-01 RX ADMIN — Medication 5 UNIT(S): at 17:11

## 2022-01-01 RX ADMIN — Medication 10 MILLILITER(S): at 17:32

## 2022-01-01 RX ADMIN — Medication 25 MILLIGRAM(S): at 05:37

## 2022-01-01 RX ADMIN — Medication 25 MILLIGRAM(S): at 06:20

## 2022-01-01 RX ADMIN — Medication 2.5 MILLIGRAM(S): at 06:40

## 2022-01-01 RX ADMIN — CEFEPIME 100 MILLIGRAM(S): 1 INJECTION, POWDER, FOR SOLUTION INTRAMUSCULAR; INTRAVENOUS at 10:39

## 2022-01-01 RX ADMIN — Medication 20 UNIT(S): at 12:13

## 2022-01-01 RX ADMIN — Medication 250 MILLIGRAM(S): at 05:11

## 2022-01-01 RX ADMIN — INSULIN GLARGINE 50 UNIT(S): 100 INJECTION, SOLUTION SUBCUTANEOUS at 21:49

## 2022-01-01 RX ADMIN — Medication 100 GRAM(S): at 17:52

## 2022-01-01 RX ADMIN — Medication 100 MILLIGRAM(S): at 05:52

## 2022-01-01 RX ADMIN — INSULIN GLARGINE 30 UNIT(S): 100 INJECTION, SOLUTION SUBCUTANEOUS at 10:50

## 2022-01-01 RX ADMIN — PEMETREXED 420 MILLIGRAM(S): 500 INJECTION, SOLUTION, CONCENTRATE INTRAVENOUS at 15:18

## 2022-01-01 RX ADMIN — CEFEPIME 100 MILLIGRAM(S): 1 INJECTION, POWDER, FOR SOLUTION INTRAMUSCULAR; INTRAVENOUS at 05:02

## 2022-01-01 RX ADMIN — ENOXAPARIN SODIUM 40 MILLIGRAM(S): 100 INJECTION SUBCUTANEOUS at 22:13

## 2022-01-01 RX ADMIN — CHOLESTYRAMINE 4 GRAM(S): 4 POWDER, FOR SUSPENSION ORAL at 06:20

## 2022-01-01 RX ADMIN — Medication 20 UNIT(S): at 17:40

## 2022-01-01 RX ADMIN — DURVALUMAB 1500 MILLIGRAM(S): 120 INJECTION, SOLUTION INTRAVENOUS at 17:35

## 2022-01-01 RX ADMIN — Medication 10 MILLILITER(S): at 06:16

## 2022-01-01 RX ADMIN — CEFEPIME 100 MILLIGRAM(S): 1 INJECTION, POWDER, FOR SOLUTION INTRAMUSCULAR; INTRAVENOUS at 22:13

## 2022-01-01 RX ADMIN — Medication 2.5 MILLIGRAM(S): at 17:09

## 2022-01-01 RX ADMIN — Medication 10 MILLILITER(S): at 17:29

## 2022-01-01 RX ADMIN — SODIUM CHLORIDE 100 MILLILITER(S): 9 INJECTION, SOLUTION INTRAVENOUS at 06:59

## 2022-01-01 RX ADMIN — Medication 10 MILLILITER(S): at 17:26

## 2022-01-01 RX ADMIN — Medication 100 MILLIGRAM(S): at 05:51

## 2022-01-01 RX ADMIN — ATORVASTATIN CALCIUM 40 MILLIGRAM(S): 80 TABLET, FILM COATED ORAL at 12:00

## 2022-01-01 RX ADMIN — Medication 1: at 08:36

## 2022-01-01 RX ADMIN — Medication 2.5 MILLIGRAM(S): at 17:38

## 2022-01-01 RX ADMIN — ATORVASTATIN CALCIUM 40 MILLIGRAM(S): 80 TABLET, FILM COATED ORAL at 12:35

## 2022-01-01 RX ADMIN — Medication 100 MILLIGRAM(S): at 13:58

## 2022-01-01 RX ADMIN — Medication 20 UNIT(S): at 08:28

## 2022-01-01 RX ADMIN — SODIUM CHLORIDE 70 MILLILITER(S): 9 INJECTION INTRAMUSCULAR; INTRAVENOUS; SUBCUTANEOUS at 16:40

## 2022-01-01 RX ADMIN — ATORVASTATIN CALCIUM 40 MILLIGRAM(S): 80 TABLET, FILM COATED ORAL at 13:10

## 2022-01-06 ENCOUNTER — LABORATORY RESULT (OUTPATIENT)
Age: 77
End: 2022-01-06

## 2022-01-06 ENCOUNTER — APPOINTMENT (OUTPATIENT)
Dept: HEMATOLOGY ONCOLOGY | Facility: CLINIC | Age: 77
End: 2022-01-06
Payer: MEDICARE

## 2022-01-06 VITALS
HEIGHT: 71 IN | RESPIRATION RATE: 16 BRPM | DIASTOLIC BLOOD PRESSURE: 68 MMHG | TEMPERATURE: 98.7 F | HEART RATE: 85 BPM | OXYGEN SATURATION: 98 % | SYSTOLIC BLOOD PRESSURE: 155 MMHG | WEIGHT: 203 LBS | BODY MASS INDEX: 28.42 KG/M2

## 2022-01-06 PROCEDURE — 99213 OFFICE O/P EST LOW 20 MIN: CPT

## 2022-01-06 NOTE — HISTORY OF PRESENT ILLNESS
[de-identified] : terval History: 5/19/2021: Today, he feels well. He denies any chest pain, coughing, wheezing, or abdominal pain. He is still actively smoking. He finds it very difficult to stop. He smokes about 1 PPD for at least 60 years. \par 6/24/21:\par  Mr. CASTANEDA came for a follow up visit for NSCLC, lung adenocarcinoma. He completed 15 RT treatment and he is due for the 3rd cycle of Chemo. He reports feels well. He denies any chest pain, coughing, wheezing, abdominal pain. dysphagia. Patient tolerating diet well. \par CBC , CMP reviewed with acceptable results. \par  He is still actively smoking. He finds it very difficult to stop. He smokes about 1 PPD for at least 60 years.\par 7/15/21;\par  Mr. CASTANEDA came for a follow up visit for NSCLC, lung adenocarcinoma. He completed 22 RT treatment and he is due for the 6th cycle of Carboplatin and taxol. He reports feels well. But he reports SOB on exertion and occasionally coughing. He is still actively smoking about 15 cigarette per day. \par  He denies any chest pain, abdominal pain. dysphagia. Patient tolerating diet well. \par CBC , CMP reviewed with acceptable results. Pulse Ox is 98% on RA.\par  He finds it very difficult to stop. He smokes about 1 PPD for at least 60 years.\par We explain to monitor for Sever coughing or CP. he needs to report to ER in case if any worsen symptoms. \par \par 08/12/2021 patient returns after completion of chemoradiation for stage 3 NSCLC , he complains of weakness, cough , decreased appetite and abnormal taste . he has dyspnea on mild exertion . he is receiving IV magnesium for low mg . He lost few lbs so far. Hgb is down to 9.7 from 12 in one month , no hematochezia. \par \par 08/26/2021 Patient returns for follow up with more weight loss . His breathing is stable , no cough . no fever . \par \par 1/6/2022 Patient returns for follow p , he feels well , he is gaining weight , he denies change in cough or breathing . As per his son , he was never given prescription for PET scan .

## 2022-01-06 NOTE — PHYSICAL EXAM
[Normal] : RRR, normal S1S2, no murmurs, rubs, gallops [de-identified] : well preserved  in no acute distress.  [de-identified] : no edema.

## 2022-01-06 NOTE — ASSESSMENT
[FreeTextEntry1] : 76 year old male with NSCLC stage 3 s/p chemoradiation .\par Plan :  PET scan , \par           Start durvalumab , discussed potential adverse effects .\par           repeat routine blood work . \par

## 2022-01-07 LAB
ALBUMIN SERPL ELPH-MCNC: 4.3 G/DL
ALP BLD-CCNC: 75 U/L
ALT SERPL-CCNC: 9 U/L
ANION GAP SERPL CALC-SCNC: 14 MMOL/L
AST SERPL-CCNC: 11 U/L
BILIRUB SERPL-MCNC: 0.3 MG/DL
BUN SERPL-MCNC: 22 MG/DL
CALCIUM SERPL-MCNC: 9.1 MG/DL
CEA SERPL-MCNC: 1.3 NG/ML
CHLORIDE SERPL-SCNC: 103 MMOL/L
CO2 SERPL-SCNC: 19 MMOL/L
CREAT SERPL-MCNC: 1.1 MG/DL
GLUCOSE SERPL-MCNC: 146 MG/DL
HCT VFR BLD CALC: 39.5 %
HGB BLD-MCNC: 13.1 G/DL
MCHC RBC-ENTMCNC: 29.2 PG
MCHC RBC-ENTMCNC: 33.2 G/DL
MCV RBC AUTO: 88.2 FL
PLATELET # BLD AUTO: 178 K/UL
PMV BLD: 9.2 FL
POTASSIUM SERPL-SCNC: 4.9 MMOL/L
PROT SERPL-MCNC: 6.7 G/DL
RBC # BLD: 4.48 M/UL
RBC # FLD: 13.1 %
SODIUM SERPL-SCNC: 136 MMOL/L
T4 FREE SERPL-MCNC: 1 NG/DL
TSH SERPL-ACNC: 4.4 UIU/ML
WBC # FLD AUTO: 9.45 K/UL

## 2022-01-14 ENCOUNTER — APPOINTMENT (OUTPATIENT)
Dept: INFUSION THERAPY | Facility: CLINIC | Age: 77
End: 2022-01-14

## 2022-01-14 ENCOUNTER — LABORATORY RESULT (OUTPATIENT)
Age: 77
End: 2022-01-14

## 2022-01-14 RX ORDER — DURVALUMAB 120 MG/2.4ML
1500 INJECTION, SOLUTION INTRAVENOUS ONCE
Refills: 0 | Status: COMPLETED | OUTPATIENT
Start: 2022-01-14 | End: 2022-01-14

## 2022-01-14 RX ADMIN — DURVALUMAB 1500 MILLIGRAM(S): 120 INJECTION, SOLUTION INTRAVENOUS at 17:35

## 2022-01-14 RX ADMIN — DURVALUMAB 280 MILLIGRAM(S): 120 INJECTION, SOLUTION INTRAVENOUS at 16:35

## 2022-01-15 LAB
HCT VFR BLD CALC: 37.8 %
HGB BLD-MCNC: 12.7 G/DL
MCHC RBC-ENTMCNC: 29.3 PG
MCHC RBC-ENTMCNC: 33.6 G/DL
MCV RBC AUTO: 87.1 FL
PLATELET # BLD AUTO: 171 K/UL
PMV BLD: 9.7 FL
RBC # BLD: 4.34 M/UL
RBC # FLD: 13.1 %
WBC # FLD AUTO: 7.95 K/UL

## 2022-02-10 NOTE — ASSESSMENT
[FreeTextEntry1] : 76 year old male with NSCLC stage 3 s/p chemoradiation . on adjuvant immunotherapy . \par LLQ pain , constipation . rule out diverticulitis . \par Low back pain . radiculopathy ?\par Plan : CT chest abdomen and pelvis , CBC , CMP \par           consider MRI or bone scan .

## 2022-02-10 NOTE — PHYSICAL EXAM
[Normal] : RRR, normal S1S2, no murmurs, rubs, gallops [de-identified] : well preserved  in no acute distress.  [de-identified] : no edema.  [de-identified] : mild LLQ tenderness , no rebound .  [de-identified] : mild tenderness over lower spine ,

## 2022-03-10 NOTE — PHYSICAL EXAM
[Normal] : no peripheral adenopathy appreciated [de-identified] : well preserved  in no acute distress.  [de-identified] : no edema.  [de-identified] : soft , no RUQ tenderness. .  [de-identified] : mild tenderness over lower spine ,

## 2022-03-10 NOTE — HISTORY OF PRESENT ILLNESS
[de-identified] : \par Mr. Vasquez is a 76 y/o M, current smoker, PMH of DM, HTN, HLD and AS, + smoker presenting for initial evaluation today for newly diagnosed NSCLC, adenocarcinoma. He presents with his son Primo.\par \par His history dates back to late April 2021 when he presented to the ED for shortness of breath after having a positive stress test as an outpatient. He reported that he could walk less than 1 block before becoming SOB which has progressively worsened. He denies any palpitations or chest pain/dizziness/syncope. He underwent cardiac catheterization and was found to have multivessel disease. He was seen by CT surgery while inpatient for AVR/CABG. Preop CT Chest showed a lobulated solid 2.7 cm RUL nodule, and a 5 mm ANIA nodule, and a 4.0 x 2.9 cm right paratracheal soft tissue lesion. S/P Bronch/EBUS 4/30/2021- Prelim Bronch/EBUS path showed carcinoma. Brain MRI negative for metastatic disease. He was discharged with outpatient follow up with oncology and cardiology. \par \par He was subsequently discharged and underwent a PET/CT as outpatient which showed FDG avid right upper lobe nodule, SUV max 23.7 compatible with biologic tumor activity. Ipsilateral FDG avid lymphadenopathy up to 17.0 compatible with hetal metastases.\par \par Final pathology shows Non-small cell carcinoma, favor adenocarcinoma. Immunohistochemical stains are performed and show the tumor is\par positive for CK7, negative for CK20, TTF-1, napsin, CDX2, PAX8; findings are compatible with primary lung adenocarcinoma\par \par  [de-identified] : terval History: 5/19/2021: Today, he feels well. He denies any chest pain, coughing, wheezing, or abdominal pain. He is still actively smoking. He finds it very difficult to stop. He smokes about 1 PPD for at least 60 years. \par 6/24/21:\par  Mr. CASTANEDA came for a follow up visit for NSCLC, lung adenocarcinoma. He completed 15 RT treatment and he is due for the 3rd cycle of Chemo. He reports feels well. He denies any chest pain, coughing, wheezing, abdominal pain. dysphagia. Patient tolerating diet well. \par CBC , CMP reviewed with acceptable results. \par  He is still actively smoking. He finds it very difficult to stop. He smokes about 1 PPD for at least 60 years.\par 7/15/21;\par  Mr. CASTANEDA came for a follow up visit for NSCLC, lung adenocarcinoma. He completed 22 RT treatment and he is due for the 6th cycle of Carboplatin and taxol. He reports feels well. But he reports SOB on exertion and occasionally coughing. He is still actively smoking about 15 cigarette per day. \par  He denies any chest pain, abdominal pain. dysphagia. Patient tolerating diet well. \par CBC , CMP reviewed with acceptable results. Pulse Ox is 98% on RA.\par  He finds it very difficult to stop. He smokes about 1 PPD for at least 60 years.\par We explain to monitor for Sever coughing or CP. he needs to report to ER in case if any worsen symptoms. \par \par 08/12/2021 patient returns after completion of chemoradiation for stage 3 NSCLC , he complains of weakness, cough , decreased appetite and abnormal taste . he has dyspnea on mild exertion . he is receiving IV magnesium for low mg . He lost few lbs so far. Hgb is down to 9.7 from 12 in one month , no hematochezia. \par \par 08/26/2021 Patient returns for follow up with more weight loss . His breathing is stable , no cough . no fever . \par \par 1/6/2022 Patient returns for follow p , he feels well , he is gaining weight , he denies change in cough or breathing . As per his son , he was never given prescription for PET scan . \par \par 2/10/2022 Patient returns for second dose of durvalmab , he complains of LLQ pain , constipation , also lower back pain radiating to the LLE with activity . no hematochezia , weakness. Still awaiting Ct scan ( PET was denied ) \par \par 3/10/2022 Patient returns for follow p , CT scan shows a new ill defined hepatic lesion suspicious for metastasis . lung mass and adenopathy has diminished . \par He feels well and denies abdominal pain , change in breathing or cough .

## 2022-04-10 NOTE — PHYSICAL EXAM
[Normal] : no peripheral adenopathy appreciated [Ambulatory and capable of all self care but unable to carry out any work activities] : Status 2- Ambulatory and capable of all self care but unable to carry out any work activities. Up and about more than 50% of waking hours [de-identified] : well preserved  in no acute distress.  [de-identified] : no edema.  [de-identified] : soft , no RUQ tenderness. .  [de-identified] : mild tenderness over lower spine ,

## 2022-04-10 NOTE — HISTORY OF PRESENT ILLNESS
[de-identified] : \par Mr. Vasquez is a 74 y/o M, current smoker, PMH of DM, HTN, HLD and AS, + smoker presenting for initial evaluation today for newly diagnosed NSCLC, adenocarcinoma. He presents with his son Primo.\par \par His history dates back to late April 2021 when he presented to the ED for shortness of breath after having a positive stress test as an outpatient. He reported that he could walk less than 1 block before becoming SOB which has progressively worsened. He denies any palpitations or chest pain/dizziness/syncope. He underwent cardiac catheterization and was found to have multivessel disease. He was seen by CT surgery while inpatient for AVR/CABG. Preop CT Chest showed a lobulated solid 2.7 cm RUL nodule, and a 5 mm ANIA nodule, and a 4.0 x 2.9 cm right paratracheal soft tissue lesion. S/P Bronch/EBUS 4/30/2021- Prelim Bronch/EBUS path showed carcinoma. Brain MRI negative for metastatic disease. He was discharged with outpatient follow up with oncology and cardiology. \par \par He was subsequently discharged and underwent a PET/CT as outpatient which showed FDG avid right upper lobe nodule, SUV max 23.7 compatible with biologic tumor activity. Ipsilateral FDG avid lymphadenopathy up to 17.0 compatible with hetal metastases.\par \par Final pathology shows Non-small cell carcinoma, favor adenocarcinoma. Immunohistochemical stains are performed and show the tumor is\par positive for CK7, negative for CK20, TTF-1, napsin, CDX2, PAX8; findings are compatible with primary lung adenocarcinoma\par \par  [de-identified] : terval History: 5/19/2021: Today, he feels well. He denies any chest pain, coughing, wheezing, or abdominal pain. He is still actively smoking. He finds it very difficult to stop. He smokes about 1 PPD for at least 60 years. \par 6/24/21:\par  Mr. CASTANEDA came for a follow up visit for NSCLC, lung adenocarcinoma. He completed 15 RT treatment and he is due for the 3rd cycle of Chemo. He reports feels well. He denies any chest pain, coughing, wheezing, abdominal pain. dysphagia. Patient tolerating diet well. \par CBC , CMP reviewed with acceptable results. \par  He is still actively smoking. He finds it very difficult to stop. He smokes about 1 PPD for at least 60 years.\par 7/15/21;\par  Mr. CASTANEDA came for a follow up visit for NSCLC, lung adenocarcinoma. He completed 22 RT treatment and he is due for the 6th cycle of Carboplatin and taxol. He reports feels well. But he reports SOB on exertion and occasionally coughing. He is still actively smoking about 15 cigarette per day. \par  He denies any chest pain, abdominal pain. dysphagia. Patient tolerating diet well. \par CBC , CMP reviewed with acceptable results. Pulse Ox is 98% on RA.\par  He finds it very difficult to stop. He smokes about 1 PPD for at least 60 years.\par We explain to monitor for Sever coughing or CP. he needs to report to ER in case if any worsen symptoms. \par \par 08/12/2021 patient returns after completion of chemoradiation for stage 3 NSCLC , he complains of weakness, cough , decreased appetite and abnormal taste . he has dyspnea on mild exertion . he is receiving IV magnesium for low mg . He lost few lbs so far. Hgb is down to 9.7 from 12 in one month , no hematochezia. \par \par 08/26/2021 Patient returns for follow up with more weight loss . His breathing is stable , no cough . no fever . \par \par 1/6/2022 Patient returns for follow p , he feels well , he is gaining weight , he denies change in cough or breathing . As per his son , he was never given prescription for PET scan . \par \par 2/10/2022 Patient returns for second dose of durvalmab , he complains of LLQ pain , constipation , also lower back pain radiating to the LLE with activity . no hematochezia , weakness. Still awaiting Ct scan ( PET was denied ) \par \par 3/10/2022 Patient returns for follow p , CT scan shows a new ill defined hepatic lesion suspicious for metastasis . lung mass and adenopathy has diminished . \par He feels well and denies abdominal pain , change in breathing or cough . \par  4/7/22:\par Patient returns for follow for  NSCLC stage IV, Currently on Immunotherapy Durvalumab every 4 weeks, Patient reports feeling  well, \par He feels well and denies abdominal pain , change in breathing or cough . \par  Patient  is aware to proceed with MRI of the Abdomen.

## 2022-04-10 NOTE — ASSESSMENT
[FreeTextEntry1] : 76 year old male with NSCLC stage 4,  s/p chemoradiation . on adjuvant immunotherapy . \par Low back pain . radiculopathy ?\par New ill defined liver lesion ,rule out metastasis . \par Plan :Continue with Durvalumab every 4 weeks. \par         MRI of the abdomen prescribed, Rx provided. \par         Blood work reviewed. \par         Order CMP, TSH, T3.\par       Patient seen and examined with Dr. Avila who agreed for the above plan of care. \par

## 2022-04-27 NOTE — ASSESSMENT
[FreeTextEntry1] : 76 year old male with NSCLC stage 3 PDL1 :0 , no actionable alterations. s/p chemoradiation and  adjuvant immunotherapy . \par Low back pain . radiculopathy ?\par \par Extensive recurrence involving skeleton , liver and abdominopelvic lymph nodes , minimally symptomatic . \par Plan : PET results discussed in detail .\par          recommended change to Alimta 500 mg/m2 , apprised of potential adverse effects mainly myelosuppression , fatigue , rash .\par          Liquid biopsy .

## 2022-04-27 NOTE — HISTORY OF PRESENT ILLNESS
[de-identified] : \par Mr. Vasquez is a 76 y/o M, current smoker, PMH of DM, HTN, HLD and AS, + smoker presenting for initial evaluation today for newly diagnosed NSCLC, adenocarcinoma. He presents with his son Primo.\par \par His history dates back to late April 2021 when he presented to the ED for shortness of breath after having a positive stress test as an outpatient. He reported that he could walk less than 1 block before becoming SOB which has progressively worsened. He denies any palpitations or chest pain/dizziness/syncope. He underwent cardiac catheterization and was found to have multivessel disease. He was seen by CT surgery while inpatient for AVR/CABG. Preop CT Chest showed a lobulated solid 2.7 cm RUL nodule, and a 5 mm ANIA nodule, and a 4.0 x 2.9 cm right paratracheal soft tissue lesion. S/P Bronch/EBUS 4/30/2021- Prelim Bronch/EBUS path showed carcinoma. Brain MRI negative for metastatic disease. He was discharged with outpatient follow up with oncology and cardiology. \par \par He was subsequently discharged and underwent a PET/CT as outpatient which showed FDG avid right upper lobe nodule, SUV max 23.7 compatible with biologic tumor activity. Ipsilateral FDG avid lymphadenopathy up to 17.0 compatible with hetal metastases.\par \par Final pathology shows Non-small cell carcinoma, favor adenocarcinoma. Immunohistochemical stains are performed and show the tumor is\par positive for CK7, negative for CK20, TTF-1, napsin, CDX2, PAX8; findings are compatible with primary lung adenocarcinoma\par \par  [de-identified] : terval History: 5/19/2021: Today, he feels well. He denies any chest pain, coughing, wheezing, or abdominal pain. He is still actively smoking. He finds it very difficult to stop. He smokes about 1 PPD for at least 60 years. \par 6/24/21:\par  Mr. CASTANEDA came for a follow up visit for NSCLC, lung adenocarcinoma. He completed 15 RT treatment and he is due for the 3rd cycle of Chemo. He reports feels well. He denies any chest pain, coughing, wheezing, abdominal pain. dysphagia. Patient tolerating diet well. \par CBC , CMP reviewed with acceptable results. \par  He is still actively smoking. He finds it very difficult to stop. He smokes about 1 PPD for at least 60 years.\par 7/15/21;\par  Mr. CASTANEDA came for a follow up visit for NSCLC, lung adenocarcinoma. He completed 22 RT treatment and he is due for the 6th cycle of Carboplatin and taxol. He reports feels well. But he reports SOB on exertion and occasionally coughing. He is still actively smoking about 15 cigarette per day. \par  He denies any chest pain, abdominal pain. dysphagia. Patient tolerating diet well. \par CBC , CMP reviewed with acceptable results. Pulse Ox is 98% on RA.\par  He finds it very difficult to stop. He smokes about 1 PPD for at least 60 years.\par We explain to monitor for Sever coughing or CP. he needs to report to ER in case if any worsen symptoms. \par \par 08/12/2021 patient returns after completion of chemoradiation for stage 3 NSCLC , he complains of weakness, cough , decreased appetite and abnormal taste . he has dyspnea on mild exertion . he is receiving IV magnesium for low mg . He lost few lbs so far. Hgb is down to 9.7 from 12 in one month , no hematochezia. \par \par 08/26/2021 Patient returns for follow up with more weight loss . His breathing is stable , no cough . no fever . \par \par 1/6/2022 Patient returns for follow p , he feels well , he is gaining weight , he denies change in cough or breathing . As per his son , he was never given prescription for PET scan . \par \par 2/10/2022 Patient returns for second dose of durvalmab , he complains of LLQ pain , constipation , also lower back pain radiating to the LLE with activity . no hematochezia , weakness. Still awaiting Ct scan ( PET was denied ) \par \par 3/10/2022 Patient returns for follow p , CT scan shows a new ill defined hepatic lesion suspicious for metastasis . lung mass and adenopathy has diminished . \par He feels well and denies abdominal pain , change in breathing or cough . \par \par 4/26/2022 Patient returns for follow up , PET scan shows extensive recurrence involving abdominopelvic LNs , bone ( right iliac wing , T2 , C4 without CT correlate ) , liver lesion measuring 5.8 X 5.2 cm . \par Marked decrease in RUL nodule , hilar and paratracheal adenopathy s/p radiation .\par He remains totally asymptomatic and denies any pain, weight loss, change in cough or breathing .

## 2022-04-27 NOTE — PHYSICAL EXAM
[Normal] : no peripheral adenopathy appreciated [de-identified] : well preserved  in no acute distress.  [de-identified] : no edema.  [de-identified] : soft , no RUQ tenderness. .  [de-identified] : mild tenderness over lower spine ,

## 2022-05-31 NOTE — PHYSICAL EXAM
[Normal] : no peripheral adenopathy appreciated [de-identified] : well preserved  in no acute distress.  [de-identified] : no edema.  [de-identified] : soft , no RUQ tenderness. .  [de-identified] : mild tenderness over lower spine ,

## 2022-05-31 NOTE — ASSESSMENT
[FreeTextEntry1] : 76 year old male with NSCLC stage 3 PDL1 :0 , no actionable alterations. s/p chemoradiation and  adjuvant immunotherapy . \par Low back pain . radiculopathy ?\par \par Extensive recurrence involving skeleton , liver and abdominopelvic lymph nodes , minimally symptomatic . \par S/P Alimta X1 , stomatitis , rash . \par Plan ; Alimta # 2 with 80 % dose \par          reinforced decadron for rash prophylaxis .

## 2022-05-31 NOTE — HISTORY OF PRESENT ILLNESS
[de-identified] : \par Mr. Vasquez is a 74 y/o M, current smoker, PMH of DM, HTN, HLD and AS, + smoker presenting for initial evaluation today for newly diagnosed NSCLC, adenocarcinoma. He presents with his son Primo.\par \par His history dates back to late April 2021 when he presented to the ED for shortness of breath after having a positive stress test as an outpatient. He reported that he could walk less than 1 block before becoming SOB which has progressively worsened. He denies any palpitations or chest pain/dizziness/syncope. He underwent cardiac catheterization and was found to have multivessel disease. He was seen by CT surgery while inpatient for AVR/CABG. Preop CT Chest showed a lobulated solid 2.7 cm RUL nodule, and a 5 mm ANIA nodule, and a 4.0 x 2.9 cm right paratracheal soft tissue lesion. S/P Bronch/EBUS 4/30/2021- Prelim Bronch/EBUS path showed carcinoma. Brain MRI negative for metastatic disease. He was discharged with outpatient follow up with oncology and cardiology. \par \par He was subsequently discharged and underwent a PET/CT as outpatient which showed FDG avid right upper lobe nodule, SUV max 23.7 compatible with biologic tumor activity. Ipsilateral FDG avid lymphadenopathy up to 17.0 compatible with hetal metastases.\par \par Final pathology shows Non-small cell carcinoma, favor adenocarcinoma. Immunohistochemical stains are performed and show the tumor is\par positive for CK7, negative for CK20, TTF-1, napsin, CDX2, PAX8; findings are compatible with primary lung adenocarcinoma\par \par  [de-identified] : terval History: 5/19/2021: Today, he feels well. He denies any chest pain, coughing, wheezing, or abdominal pain. He is still actively smoking. He finds it very difficult to stop. He smokes about 1 PPD for at least 60 years. \par 6/24/21:\par  Mr. CASTANEDA came for a follow up visit for NSCLC, lung adenocarcinoma. He completed 15 RT treatment and he is due for the 3rd cycle of Chemo. He reports feels well. He denies any chest pain, coughing, wheezing, abdominal pain. dysphagia. Patient tolerating diet well. \par CBC , CMP reviewed with acceptable results. \par  He is still actively smoking. He finds it very difficult to stop. He smokes about 1 PPD for at least 60 years.\par 7/15/21;\par  Mr. CASTANEDA came for a follow up visit for NSCLC, lung adenocarcinoma. He completed 22 RT treatment and he is due for the 6th cycle of Carboplatin and taxol. He reports feels well. But he reports SOB on exertion and occasionally coughing. He is still actively smoking about 15 cigarette per day. \par  He denies any chest pain, abdominal pain. dysphagia. Patient tolerating diet well. \par CBC , CMP reviewed with acceptable results. Pulse Ox is 98% on RA.\par  He finds it very difficult to stop. He smokes about 1 PPD for at least 60 years.\par We explain to monitor for Sever coughing or CP. he needs to report to ER in case if any worsen symptoms. \par \par 08/12/2021 patient returns after completion of chemoradiation for stage 3 NSCLC , he complains of weakness, cough , decreased appetite and abnormal taste . he has dyspnea on mild exertion . he is receiving IV magnesium for low mg . He lost few lbs so far. Hgb is down to 9.7 from 12 in one month , no hematochezia. \par \par 08/26/2021 Patient returns for follow up with more weight loss . His breathing is stable , no cough . no fever . \par \par 1/6/2022 Patient returns for follow p , he feels well , he is gaining weight , he denies change in cough or breathing . As per his son , he was never given prescription for PET scan . \par \par 2/10/2022 Patient returns for second dose of durvalmab , he complains of LLQ pain , constipation , also lower back pain radiating to the LLE with activity . no hematochezia , weakness. Still awaiting Ct scan ( PET was denied ) \par \par 3/10/2022 Patient returns for follow p , CT scan shows a new ill defined hepatic lesion suspicious for metastasis . lung mass and adenopathy has diminished . \par He feels well and denies abdominal pain , change in breathing or cough . \par \par 4/26/2022 Patient returns for follow up , PET scan shows extensive recurrence involving abdominopelvic LNs , bone ( right iliac wing , T2 , C4 without CT correlate ) , liver lesion measuring 5.8 X 5.2 cm . \par Marked decrease in RUL nodule , hilar and paratracheal adenopathy s/p radiation .\par He remains totally asymptomatic and denies any pain, weight loss, change in cough or breathing . \par \par 5/26/2022 Patient returns after Alimta X1 , he developed rash , stomatitis for several days . As per his son , he did not take decadron as recommended. He denies weight loss or diarrhea.

## 2022-06-05 NOTE — H&P ADULT - NSHPLABSRESULTS_GEN_ALL_CORE
8.8    3.98  )-----------( 91       ( 05 Jun 2022 16:54 )             26.4       06-05    131<L>  |  94<L>  |  23<H>  ----------------------------<  303<H>  4.2   |  22  |  1.2    Ca    8.5      05 Jun 2022 16:54  Mg     1.7     06-05    TPro  6.1  /  Alb  3.5  /  TBili  0.7  /  DBili  x   /  AST  16  /  ALT  17  /  AlkPhos  100  06-05

## 2022-06-05 NOTE — H&P ADULT - ASSESSMENT
77 y/o M w/ PMHx of Lung Ca Stage 3, HTN, HLD who presents to ED for generalized weakness, lightheadedness and chest pain x3 days. patient last chemo x2 days ago, follows with Dr Avila. patient with productive cough with central, non-radiating chest discomfort, worse with coughing. patient also endorses generalized weakness, worse with ambulation, felt lightheadedness last night when attempting to ambulate resulting in patient slowly falling to ground, no loc, did not hit head. +fever @ home today. denies hemoptysis, abd pain, back pain, bowel changes, nausea/vomiting.    #Suspected Community Acquired Pneumonia  #Septic on Admission  - In ED, VS sig for TMax 102.3, , Placed on 3L NC O2 99%  - WBC count 3.98 on admission  - CXR appears to show pulmonary vascular congestion  - Given dose of Cefepime and Azithromycin  - c/w Cefepime and Azithromycin  - Send MRSA Nares, Urine Strep and Legionella, ProCal  - f/u Blood Cultures     #Possible CHFpEF Exacerbation  - BNP on 2185, prior BNP from 4/21 616  - CXR appears to show pulmonary vascular congestion  - TTE from 4/21: EF 64%, G1DD, Mod AS  - Start on Lasix 40 mg IV qD for now  - f/u Repeat 2D ECHO    #Chest Pain  - EKG sig for Sinus Tachycardia  - Troponin neg x 1  -   -     #Hyperglycemia  #Type 2 DM   - A1C 4/21 7.8  - Blood Glucose on admission 303 on CMP  - Home Meds:  - Start Lantus 21 U/ Lispro 7 U  - Monitor FS, Keep 140-180, Adjust as needed    #Normocytic Anemia   - Hg 8.8 on admission, was 13 on 4/21  - Possibly 2/2 Chemo  - f/u Iron Studies, B12, Folate, TSH  - Trend CBC, Keep > 7, Transfuse as needed    #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 Avila (Hem/Onc) and Dr. Frey (Rad/Onc)  - f/u Hem/Onc OP    #Hypertension  #Hyperlipidemia   - Normotensive in ED  - c/w Home Meds  - c/w Atorvastatin  75 y/o M w/ PMHx of Lung Ca Stage 3, HTN, HLD who presents to ED for generalized weakness, lightheadedness and chest pain x3 days. patient last chemo x2 days ago    #Suspected Community Acquired Pneumonia  #Septic on Admission  - In ED, VS sig for TMax 102.3, , Placed on 3L NC O2 99%  - WBC count 3.98 on admission  - CXR appears to show pulmonary vascular congestion  - Given dose of Cefepime and Azithromycin  - c/w Cefepime and Azithromycin  - Send MRSA Nares, Urine Strep and Legionella, ProCal  - f/u Blood Cultures     #Possible CHFpEF Exacerbation  - BNP on 2185, prior BNP from 4/21 616  - CXR appears to show pulmonary vascular congestion  - TTE from 4/21: EF 64%, G1DD, Mod AS  - Start on Lasix 40 mg IV qD for now  - f/u Repeat 2D ECHO    #Chest Pain, Weakness, Lightheadedness  - EKG sig for Sinus Tachycardia  - Troponin neg x 1  - TTE from 4/21: EF 64%, G1DD, Mod AS  - Repeat Trop once more  - Repeat 2D ECHO    #Hyperglycemia  #Type 2 DM   - A1C 4/21 7.8  - Blood Glucose on admission 303 on CMP  - Home Meds:  - Start Lantus 21 U/ Lispro 7 U  - Monitor FS, Keep 140-180, Adjust as needed    #Normocytic Anemia   - Hg 8.8 on admission, was 13 on 4/21  - Possibly 2/2 Chemo  - f/u Iron Studies, B12, Folate, TSH  - Trend CBC, Keep > 7, Transfuse as needed    #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 Avila (Hem/Onc) and Dr. Frey (Rad/Onc)  - f/u Hem/Onc OP    #Hypertension  #Hyperlipidemia   - Normotensive in ED  - c/w Home Meds  - c/w Atorvastatin     #Diet: DASH, CC  #DVT pro: Lovenox  #GI pro: Protonix  #Dispo: Medicine   77 y/o M w/ PMHx of NSCLC Ca Stage 3 on Chemo (last session 5/31), HTN, HLD presenting to ED for SOB and Cough.    #Suspected Community Acquired Pneumonia  #Septic on Admission  - In ED, VS sig for TMax 102.3, , Placed on 3L NC O2 99%  - WBC count 3.98 on admission  - CXR appears to show pulmonary vascular congestion  - Given dose of Cefepime and Azithromycin  - c/w Cefepime and Azithromycin  - Send MRSA Nares, Urine Strep and Legionella, ProCal  - f/u Blood & Sputum Cx    #H/O CHFpEF Exacerbation  - BNP on 2185, prior BNP from 4/21 616  - CXR appears to show pulmonary vascular congestion  - TTE from 4/21: EF 64%, G1DD, Mod AS  - f/u Repeat 2D ECHO    #Chest Pain, Weakness, Lightheadedness  - EKG sig for Sinus Tachycardia  - Troponin neg x 1  - TTE from 4/21: EF 64%, G1DD, Mod AS  - Repeat Trop once more  - Repeat 2D ECHO    #Hyperglycemia  #Type 2 DM   - A1C 4/21 7.8  - Blood Glucose on admission 303 on CMP  - Home Meds:  - Start Lantus 21 U/ Lispro 7 U  - Monitor FS, Keep 140-180, Adjust as needed    #Normocytic Anemia   - Hg 8.8 on admission, was 13 on 4/21  - Possibly 2/2 Chemo  - f/u Iron Studies, B12, Folate, TSH  - Trend CBC, Keep > 7, Transfuse as needed    #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 Avila (Hem/Onc) and Dr. Frey (Rad/Onc)  - f/u Hem/Onc OP    #Hypertension  #Hyperlipidemia   - Normotensive in ED  - c/w Home Meds  - c/w Atorvastatin     #Diet: DASH, CC  #DVT pro: Lovenox  #GI pro: Protonix  #Dispo: Medicine   75 y/o M w/ PMHx of NSCLC Ca Stage 3 on Chemo (last session 5/31), HTN, HLD presenting to ED for SOB and Cough.    #Suspected Community Acquired Pneumonia  #Septic on Admission  #CP likely 2/2 Coughing  - In ED, VS sig for TMax 102.3, , Placed on 3L NC O2 99%  - WBC count 3.98 on admission  - CXR appears to show pulmonary vascular congestion  - EKG sig for Sinus Tachycardia, Troponin neg x 1  - TTE from 4/21: EF 64%, G1DD, Mod AS  - Given dose of Cefepime and Azithromycin  - c/w Cefepime and Azithromycin  - Send MRSA Nares, Urine Strep and Legionella, ProCal  - f/u Blood & Sputum Cx    #H/O CHFpEF Exacerbation  - BNP on 2185, prior BNP from 4/21 616  - CXR appears to show pulmonary vascular congestion  - Does not appear congested on PE  - TTE from 4/21: EF 64%, G1DD, Mod AS  - f/u Repeat 2D ECHO  - Start Lasix 40 mg IV qD for now    #Hyperglycemia  #Type 2 DM   - A1C 4/21 7.8  - Blood Glucose on admission 303 on CMP  - Home Meds:  - Start Lantus 21 U/ Lispro 7 U  - Monitor FS, Keep 140-180, Adjust as needed    #Normocytic Anemia   - Hg 8.8 on admission, was 13 on 4/21  - Possibly 2/2 Chemo   - f/u Iron Studies, B12, Folate, TSH  - Trend CBC, Keep > 7, Transfuse as needed    #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 Avila (Hem/Onc) and Dr. Frey (Rad/Onc)  - f/u Hem/Onc OP    #Hypertension  #Hyperlipidemia   - Normotensive in ED  - c/w Home Meds  - c/w Atorvastatin     #Diet: DASH, CC  #DVT pro: Lovenox  #GI pro: Protonix  #Dispo: Medicine   75 y/o M w/ PMHx of NSCLC Ca Stage 3 on Chemo (last session 5/31), HTN, HLD presenting to ED for SOB and Cough.    #Suspected Community Acquired Pneumonia  #Septic on Admission  #CP likely 2/2 Coughing  - In ED, VS sig for TMax 102.3, , Placed on 3L NC O2 99%  - WBC count 3.98 on admission  - CXR appears to show pulmonary vascular congestion  - EKG sig for Sinus Tachycardia, Troponin neg x 1  - TTE from 4/21: EF 64%, G1DD, Mod AS  - Given dose of Cefepime and Azithromycin  - c/w Cefepime and Azithromycin  - Send MRSA Nares, Urine Strep and Legionella, ProCal  - f/u Blood & Sputum Cx    #H/O CHFpEF Exacerbation  - BNP on 2185, prior BNP from 4/21 616  - CXR appears to show pulmonary vascular congestion  - Does not appear congested on PE  - TTE from 4/21: EF 64%, G1DD, Mod AS  - f/u Repeat 2D ECHO  - Start Lasix 40 mg IV qD for now    #Hyperglycemia  #Type 2 DM   - A1C 4/21 7.8  - Blood Glucose on admission 303 on CMP  - Home Meds: Metformin 500 mg BID  - Start Lantus 15 U/ Lispro 5 U  - Monitor FS, Keep 140-180, Adjust as needed    #Normocytic Anemia   - Hg 8.8 on admission, was 13 on 4/21  - Possibly 2/2 Chemo   - f/u Iron Studies, B12, Folate, TSH  - Trend CBC, Keep > 7, Transfuse as needed  - c/w Folic Acid 1 mg qD    #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 Avila (Hem/Onc) and Dr. Frey (Rad/Onc)  - f/u Hem/Onc OP    #Hypertension  #Hyperlipidemia   - Normotensive in ED  - c/w Home Meds: ASA 81 mg qD, Atorvastatin 40 mg qD  - c/w Lisinopril 10 mg qD    #Diet: DASH, CC  #DVT pro: Lovenox  #GI pro: Protonix  #Dispo: Medicine

## 2022-06-05 NOTE — H&P ADULT - HISTORY OF PRESENT ILLNESS
75 y/o M w/ PMHx of Lung Ca Stage 3, HTN, HLD who presents to ED for generalized weakness, lightheadedness and chest pain x3 days. patient last chemo x2 days ago, follows with Dr Avila. patient with productive cough with central, non-radiating chest discomfort, worse with coughing. patient also endorses generalized weakness, worse with ambulation, felt lightheadedness last night when attempting to ambulate resulting in patient slowly falling to ground, no loc, did not hit head. +fever @ home today. denies hemoptysis, abd pain, back pain, bowel changes, nausea/vomiting.    In ED, VS sig for TMax 102.3, , Placed on 3L NC. Labs sig 8.8, Na 131, bG 303, Mg 1.7. BNP 2186, VBG wnl. CXR appears to show pulmonary vascular congestion. Given dose of Cefepime and Azihtromycin. Admitted to Medicine 77 y/o M w/ PMHx of NSCLC Ca Stage 3 on Chemo (last session 5/31), HTN, HLD presenting to ED for SOB and Cough. Patient states that for the last several days ago w/ persistent productive cough of whitish sputum, 1 table spoon amount, lasts 15 min, recurs every 5-10 min no blood noted in cough. Cough a/w CP, non-radiating, only present whilst coughing, not present at rest, or related to exertion or respiration. Patient has also been having fevers, as high as 103 F, and SOB. Pulse Ox at home 94-95. Sx progressively worsening so decided to come to ED. Patient also states he has been urinating every few hours, but denies any dysuria. Patient follows w/ Dr. Avila, on chemo and immunotherapy, last session 5/31/22. Denies any OSBORNE, Chills, N/V/D/C, abd pain, and LE pain.     In ED, VS sig for TMax 102.3, , Placed on 3L NC. Labs sig 8.8, Na 131, bG 303, Mg 1.7. BNP 2186, VBG wnl. CXR appears to show pulmonary vascular congestion. Given dose of Cefepime and Azithromycin. Admitted to Medicine

## 2022-06-05 NOTE — ED ADULT NURSE NOTE - INTERVENTIONS DEFINITIONS
[place] to call system/Call bell, personal items and telephone within reach/Instruct patient to call for assistance/Physically safe environment: no spills, clutter or unnecessary equipment/Stretcher in lowest position, wheels locked, appropriate side rails in place/Monitor gait and stability/Reinforce activity limits and safety measures with patient and family

## 2022-06-05 NOTE — ED PROVIDER NOTE - PROGRESS NOTE DETAILS
ED: Patient now appears well after hydration and antipyretics, states he is feeling much better, chest x-ray suspicious for an infiltrate, antibiotics ordered will admit.

## 2022-06-05 NOTE — ED PROVIDER NOTE - OBJECTIVE STATEMENT
76 year old male, past medical history lung ca stage 3, htn, hdl, who presents for eval. patient with generalized weakness, lightheadedness and chest pain x3 days. patient last chemo x2 days ago, follows with Dr Avila. patient with productive cough with central, non-radiating chest discomfort, worse with coughing. patient also endorses generalized weakness, worse with ambulation, felt lightheadedness last night when attempting to ambulate resulting in patient slowly falling to ground, no loc, did not hit head. +fever @ home today. denies hemoptysis, abd pain, back pain, bowel changes, nausea/vomiting.

## 2022-06-05 NOTE — ED PROVIDER NOTE - ATTENDING APP SHARED VISIT CONTRIBUTION OF CARE
76-year-old male past medical history of CAD, HTN, lung cancer chemotherapy presented for evaluation of generalized weakness associated with fever, cough, feeling short of breath, yesterday had a fall due to weakness.  He denies head injury, injury he reports decreased appetite but denies any change in urination, no diarrhea, no black or bloody stools, no skin rash. In addition, patient reports chest soreness with coughing.  No recent travel, sick contacts.  Elderly male resting on the stretcher appears weak/tired but in no acute distress, PERRL, pink conjunctiva, MMM, normal work of breathing, speaking full sentences, inspiratory crackles at the left base, RRR, systolic murmur, distal pulses intact, well-perfused extremities, abdomen soft/nontender/nondistended, no midline spine or CVA tenderness to palpation, no leg edema or unilateral calf ttp, awake and alert x3, no gross neuro deficits.  Plan: labs, fluids, chest x-ray, EKG, plan to admit.

## 2022-06-05 NOTE — ED ADULT NURSE NOTE - NSSUHOSCREENINGYN_ED_ALL_ED
Nursing requesting tramadol 50 mg for right knee pains     I agree.       Topher Junior MD Yes - the patient is able to be screened

## 2022-06-05 NOTE — ED PROVIDER NOTE - PHYSICAL EXAMINATION
CONSTITUTIONAL: Well-developed; well-nourished; in no acute distress, nontoxic appearing  SKIN: skin exam is warm and dry  EYES: PERRL, conjunctiva and sclera clear.  ENT: MMM  CARD: S1, S2 normal, no murmur  RESP: No wheezes, rales or rhonchi. Good air movement bilaterally. No increased WOB.   ABD: soft; non-distended; non-tender   EXT: Normal ROM. No cyanosis or edema.    NEURO: awake, alert, following commands, oriented, grossly unremarkable. No Focal deficits. GCS 15.   PSYCH: Cooperative, appropriate.

## 2022-06-05 NOTE — ED PROVIDER NOTE - NS ED ROS FT
Review of Systems:  	•	CONSTITUTIONAL: +fever, +body aches, +chills   	•	SKIN: no rash  	•	ENT: no difficulty swallowing  	•	RESPIRATORY: +shortness of breath, +cough  	•	CARDIAC: +chest pain, no palpitations  	•	GI: no abd pain, no nausea, no vomiting, no diarrhea  	•	GENITO-URINARY: no discharge, no dysuria; no hematuria, +increased urinary frequency  	•	MUSCULOSKELETAL: no joint paint, no swelling, no redness  	•	NEUROLOGIC: +weakness, no syncope, no headache   	•	PSYCH: no anxiety, non suicidal, non homicidal, no hallucination, no depression

## 2022-06-06 NOTE — PROGRESS NOTE ADULT - SUBJECTIVE AND OBJECTIVE BOX
SUBJECTIVE:  Patient is a 76y old Male who presents with a chief complaint of  Currently admitted to medicine with the primary diagnosis of Neutropenic fever     Today is hospital day 1d. Patient had an episode of SVT with hypoxia which resolved with IV lasix 40 mg and metoprolol     HPI  HPI:  75 y/o M w/ PMHx of NSCLC Ca Stage 3 on Chemo (last session ), HTN, HLD presenting to ED for SOB and Cough. Patient states that for the last several days ago w/ persistent productive cough of whitish sputum, 1 table spoon amount, lasts 15 min, recurs every 5-10 min no blood noted in cough. Cough a/w CP, non-radiating, only present whilst coughing, not present at rest, or related to exertion or respiration. Patient has also been having fevers, as high as 103 F, and SOB. Pulse Ox at home 94-95. Sx progressively worsening so decided to come to ED. Patient also states he has been urinating every few hours, but denies any dysuria. Patient follows w/ Dr. Avila, on chemo and immunotherapy, last session 22. Denies any OSBORNE, Chills, N/V/D/C, abd pain, and LE pain.     In ED, VS sig for TMax 102.3, , Placed on 3L NC. Labs sig 8.8, Na 131, bG 303, Mg 1.7. BNP 2186, VBG wnl. CXR appears to show pulmonary vascular congestion. Given dose of Cefepime and Azithromycin. Admitted to Medicine (2022 20:53)      PAST MEDICAL & SURGICAL HISTORY  HTN (hypertension)    HLD (hyperlipidemia)    No significant past surgical history      ALLERGIES:  No Known Allergies    MEDICATIONS:  HOME MEDICATIONS  Aspir 81 oral delayed release tablet: 1 tab(s) orally once a day   atorvastatin 40 mg oral tablet: 1 tab(s) orally once a day  dexamethasone 4 mg oral tablet: 1 tab(s) orally 2 times a day  dronabinol 2.5 mg oral capsule: 1 cap(s) orally 2 times a day  folic acid 1 mg oral tablet: 1 tab(s) orally once a day  lisinopril 5 mg oral tablet: 1 tab(s) orally once a day  metFORMIN 500 mg oral tablet: 1 tab(s) orally 2 times a day  METOPROLOL SUCCINATE ER  25 MG TB24: 1 tab(s) orally once a day  prochlorperazine 10 mg oral tablet: 1 tab(s) orally every 6 hours  Zofran 8 mg oral tablet: 1 tab(s) orally 3 times a day    STANDING MEDICATIONS  aspirin enteric coated 81 milliGRAM(s) Oral daily  atorvastatin Oral Tab/Cap - Peds 40 milliGRAM(s) Oral daily  benzonatate 100 milliGRAM(s) Oral three times a day  cefepime   IVPB 2000 milliGRAM(s) IV Intermittent every 8 hours  dexAMETHasone     Tablet 4 milliGRAM(s) Oral two times a day  enoxaparin Injectable 40 milliGRAM(s) SubCutaneous every 24 hours  folic acid 1 milliGRAM(s) Oral daily  furosemide   Injectable 40 milliGRAM(s) IV Push daily  guaifenesin/dextromethorphan Oral Liquid 10 milliLiter(s) Oral every 6 hours  lisinopril 5 milliGRAM(s) Oral daily  metoprolol succinate ER 25 milliGRAM(s) Oral daily  vancomycin  IVPB 1250 milliGRAM(s) IV Intermittent every 12 hours    PRN MEDICATIONS    VITALS:   T(C): 37.6 (22 @ 05:22), Max: 39.1 (22 @ 15:23)  T(F): 99.7 (22 @ 05:22), Max: 102.3 (22 @ 15:23)  HR: 93 (22 @ 05:22) (80 - 162)  BP: 113/56 (22 @ 05:22) (109/59 - 184/84)  BP(mean): --  ABP: --  ABP(mean): --  RR: 20 (22 @ 05:22) (19 - 24)  SpO2: 98% (22 @ 05:22) (86% - 99%)  LABS:                        8.8    3.98  )-----------( 91       ( 2022 16:54 )             26.4         131<L>  |  94<L>  |  23<H>  ----------------------------<  303<H>  4.2   |  22  |  1.2    Ca    8.5      2022 16:54  Mg     1.7     -    TPro  6.1  /  Alb  3.5  /  TBili  0.7  /  DBili  x   /  AST  16  /  ALT  17  /  AlkPhos  100  06-      Urinalysis Basic - ( 2022 20:57 )    Color: Yellow / Appearance: Clear / S.016 / pH: x  Gluc: x / Ketone: Negative  / Bili: Negative / Urobili: <2 mg/dL   Blood: x / Protein: 100 mg/dL / Nitrite: Negative   Leuk Esterase: Negative / RBC: 0 /HPF / WBC 2 /HPF   Sq Epi: x / Non Sq Epi: 1 /HPF / Bacteria: Negative      I&O's Detail      Troponin T, Serum: 0.01 ng/mL (22 @ 16:54)        CARDIAC MARKERS ( 2022 16:54 )  x     / 0.01 ng/mL / x     / x     / x          RADIOLOGY:  EKG  12 Lead ECG:   Ventricular Rate 65 BPM    Atrial Rate 65 BPM    P-R Interval 224 ms    QRS Duration 106 ms    Q-T Interval 416 ms    QTC Calculation(Bazett) 432 ms    P Axis -19 degrees    R Axis -64 degrees    T Axis 148 degrees    Diagnosis Line Sinus rhythm with 1st degree A-V block with occasional Premature ventricular  complexes  Incomplete right bundle branch block  Left anterior fascicular block  Left ventricular hypertrophy with repolarization abnormality ( Joel product   )  Abnormal ECG    Confirmed by Omer Madrid ([id number]) on 2021 5:48:22 PM (21 @ 15:10)  12 Lead ECG:   Ventricular Rate 77 BPM    Atrial Rate 77 BPM    P-R Interval 212 ms    QRS Duration 114 ms    Q-T Interval 408 ms    QTC Calculation(Bazett) 461 ms    P Axis -10 degrees    R Axis -62 degrees    T Axis 126 degrees    Diagnosis Line Sinus rhythm with 1st degree A-V block with occasional Premature ventricular  complexes  Incomplete right bundle branch block  Left anterior fascicular block  Minimal voltage criteria for LVH, may be normal variant ( Admire product )  Cannot rule out Anterior infarct , age undetermined  ST & T wave abnormality, consider lateral ischemia  Abnormal ECG    Confirmed by Renato Ramírez ([id number]) on 2021 9:47:02 PM (21 @ 20:54)      PHYSICAL EXAM:  GEN: No acute distress  HEENT: Normocephalic  NECK: Supple  LUNGS: Decreased breathe sounds  HEART: Regular  ABD: Soft, non-tender, non-distended.  EXT: NE/2+PP  NEURO: AAOX3  PSYCH: Mood is appropriate, following commands

## 2022-06-06 NOTE — PATIENT PROFILE ADULT - FALL HARM RISK - HARM RISK INTERVENTIONS

## 2022-06-06 NOTE — PATIENT PROFILE ADULT - NSPROHMDIABETBLDGLCTST_GEN_A_NUR
Please assist pt with establishing care with new provider.    Will authorize short term refill      Jie Alvarenga RN  
Reason for Call:  Medication or medication refill:    Do you use a [facility]?  Name of the pharmacy and phone number for the current request:  [facility] is calling    Name of the medication requested:  Patients insurance changed and they require a new script for  ProAir HFA inhaler instead of albuterol (PROAIR HFA/PROVENTIL HFA/VENTOLIN HFA) 108 (90 Base) MCG/ACT inhaler    Other request:     Can we leave a detailed message on this number? YES    Phone number patient can be reached at: Other phone number:  [phone]    Best Time: any    Call taken on 2/8/2022 at 10:00 AM by Raiza Tom      
Scheduled patient for an appointment at the  location as this is more convienent for him.  Patient does not need refill; as his appointment is 2/9    Marisa Wilburn/  [place]   
daily

## 2022-06-06 NOTE — CHART NOTE - NSCHARTNOTEFT_GEN_A_CORE
Pt became acutely hypoxic, tahycardic and tachypneic. EKG performed showed SVT, had already given lopressor so repeated it twice. Gave lasix 40, crackles on exam. Vitals stabilized after admiistering lopressor and lasix, pts symptoms improved. Pt became acutely hypoxic, tahycardic and tachypneic. EKG performed showed SVT, had already given lopressor so repeated it twice. Gave lasix 40, crackles on exam. Vitals stabilized after admiistering lopressor and lasix, pts symptoms improved. Please notify Dr. Collins for further management Pt became acutely hypoxic, tahycardic and tachypneic. EKG performed showed SVT, had already given lopressor so repeated it twice. Gave lasix 40, crackles on exam, repeat xray showed worsening congestion.   Vitals stabilized after administering lopressor and lasix, pts symptoms improved. Please notify Dr. Collins for further management

## 2022-06-06 NOTE — PROGRESS NOTE ADULT - ASSESSMENT
75 y/o M w/ PMHx of NSCLC Ca Stage 3 on Chemo (last session 5/31), HTN, HLD presenting to ED for SOB and Cough.    #Suspected Community Acquired Pneumonia  #Septic on Admission  #CP likely 2/2 Coughing  - In ED, VS sig for TMax 102.3, , Placed on 3L NC O2 99%  - WBC count 3.98 on admission  - CXR appears to show pulmonary vascular congestion  - EKG sig for Sinus Tachycardia, Troponin neg x 1  - TTE from 4/21: EF 64%, G1DD, Mod AS  - Given dose of Cefepime and Azithromycin  - c/w Cefepime and Azithromycin  - MRSA Nares, Urine Strep and Legionella, ProCal  - f/u Blood & Sputum Cx    #H/O HFpEF   - BNP on 2185, prior from 4/21 616  - CXR appears to show pulmonary vascular congestion  - Does not appear congested on PE  - TTE from 4/21: EF 64%, G1DD, Mod AS  - f/u Repeat 2D ECHO  - Start Lasix 40 mg IV qD for now    #Hyperglycemia  #Type 2 DM   - A1C 4/21 7.8  - Blood Glucose on admission 303 on CMP  - Home Meds: Metformin 500 mg BID  - Lantus 15 U/ Lispro 5 U  - Monitor FS, Keep 140-180, Adjust as needed    #Normocytic Anemia   - Hg 8.8 on admission, was 13 on 4/21  - Possibly 2/2 Chemo   - f/u Iron Studies, B12, Folate, TSH  - Trend CBC, Keep > 7, Transfuse as needed  - c/w Folic Acid 1 mg qD    #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 Avila (Hem/Onc) and Dr. Frey (Rad/Onc)  - f/u Hem/Onc OP    #Hypertension  #Hyperlipidemia   - Normotensive in ED  - c/w Home Meds: ASA 81 mg qD, Atorvastatin 40 mg qD  - c/w Lisinopril 10 mg qD    Diet: DASH, CC  DVT ppx: Lovenox  GI ppx: Protonix  Dispo: Medicine 75 y/o M w/ PMHx of NSCLC Ca Stage 3 on Chemo (last session 5/31), HTN, HLD presenting to ED for SOB and Cough.    #Community Acquired Pneumonia  #Septic on Admission  #CP likely 2/2 Coughing  - In ED, VS sig for TMax 102.3, , Placed on 3L NC O2 99%  - WBC count 3.98 on admission  - CXR appears to show pulmonary vascular congestion  - EKG sig for Sinus Tachycardia, Troponin neg x 1  - TTE from 4/21: EF 64%, G1DD, Mod AS  - Given dose of Cefepime and Azithromycin  - c/w Cefepime and Azithromycin  - MRSA Nares, Urine Strep and Legionella, ProCal  - f/u Blood & Sputum Cx    #H/O HFpEF/CAD  - BNP on 2185, prior from 4/21 616  - CXR appears to show pulmonary vascular congestion  - Does not appear congested on PE  - TTE from 4/21: EF 64%, G1DD, Mod AS  - f/u Repeat 2D ECHO  - Start Lasix 40 mg IV qD for now  will monitor for decompensation    #Hyperglycemia/DM2  #Type 2 DM   - A1C 4/21 7.8  - Blood Glucose on admission 303 on CMP  - Home Meds: Metformin 500 mg BID  - Lantus 15 U/ Lispro 5 U  - Monitor FS, Keep 140-180, Adjust as needed    #Normocytic Anemia   - Hg 8.8 on admission, was 13 on 4/21  - Possibly 2/2 Chemo   - f/u Iron Studies, B12, Folate, TSH  - Trend CBC, Keep > 7, Transfuse as needed  - c/w Folic Acid 1 mg qD    #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 Avila (Hem/Onc) and Dr. Frey (Rad/Onc)  - f/u Hem/Onc OP    #Hypertension  #Hyperlipidemia   - Normotensive in ED  - c/w Home Meds: ASA 81 mg qD, Atorvastatin 40 mg qD  - c/w Lisinopril 10 mg qD    Diet: DASH, CC  DVT ppx: Lovenox  GI ppx: Protonix  Dispo: Medicine

## 2022-06-07 NOTE — PROGRESS NOTE ADULT - SUBJECTIVE AND OBJECTIVE BOX
SUBJECTIVE:    Patient is a 76y old Male who presents with a chief complaint of productive cough (2022 07:19)    Currently admitted to medicine with the primary diagnosis of Neutropenic fever       Today is hospital day 2d. This morning he is resting comfortably in bed and reports no new issues or overnight events.     PAST MEDICAL & SURGICAL HISTORY  HTN (hypertension)    HLD (hyperlipidemia)    No significant past surgical history      SOCIAL HISTORY:  Negative for smoking/alcohol/drug use.     ALLERGIES:  No Known Allergies    MEDICATIONS:  STANDING MEDICATIONS  aspirin enteric coated 81 milliGRAM(s) Oral daily  atorvastatin Oral Tab/Cap - Peds 40 milliGRAM(s) Oral daily  benzonatate 100 milliGRAM(s) Oral three times a day  dexAMETHasone     Tablet 4 milliGRAM(s) Oral two times a day  dextrose 5%. 1000 milliLiter(s) IV Continuous <Continuous>  dextrose 5%. 1000 milliLiter(s) IV Continuous <Continuous>  dextrose 50% Injectable 25 Gram(s) IV Push once  dextrose 50% Injectable 12.5 Gram(s) IV Push once  dextrose 50% Injectable 25 Gram(s) IV Push once  enoxaparin Injectable 40 milliGRAM(s) SubCutaneous every 24 hours  folic acid 1 milliGRAM(s) Oral daily  glucagon  Injectable 1 milliGRAM(s) IntraMuscular once  guaifenesin/dextromethorphan Oral Liquid 10 milliLiter(s) Oral every 6 hours  insulin glargine Injectable (LANTUS) 30 Unit(s) SubCutaneous every morning  insulin lispro (ADMELOG) corrective regimen sliding scale   SubCutaneous three times a day before meals  insulin lispro Injectable (ADMELOG) 5 Unit(s) SubCutaneous three times a day before meals  insulin regular  human recombinant. 10 Unit(s) IV Push once  lactated ringers. 1000 milliLiter(s) IV Continuous <Continuous>  lisinopril 5 milliGRAM(s) Oral daily  metoprolol succinate ER 25 milliGRAM(s) Oral daily  polyethylene glycol 3350 17 Gram(s) Oral daily  potassium chloride  20 mEq/100 mL IVPB 20 milliEquivalent(s) IV Intermittent every 2 hours    PRN MEDICATIONS  dextrose Oral Gel 15 Gram(s) Oral once PRN    VITALS:   T(F): 96.5  HR: 83  BP: 114/56  RR: 18  SpO2: 96%    LABS:                        8.9    9.21  )-----------( 129      ( 2022 06:49 )             27.0     06-07    128<L>  |  93<L>  |  49<H>  ----------------------------<  679<HH>  4.6   |  20  |  1.9<H>    Ca    8.7      2022 06:49  Mg     2.0     06-07    TPro  5.5<L>  /  Alb  3.1<L>  /  TBili  0.3  /  DBili  x   /  AST  26  /  ALT  29  /  AlkPhos  116<H>  06-      Urinalysis Basic - ( 2022 20:57 )    Color: Yellow / Appearance: Clear / S.016 / pH: x  Gluc: x / Ketone: Negative  / Bili: Negative / Urobili: <2 mg/dL   Blood: x / Protein: 100 mg/dL / Nitrite: Negative   Leuk Esterase: Negative / RBC: 0 /HPF / WBC 2 /HPF   Sq Epi: x / Non Sq Epi: 1 /HPF / Bacteria: Negative        Troponin T, Serum: 0.11 ng/mL *HH* (22 @ 16:20)      Culture - Urine (collected 2022 20:57)  Source: Clean Catch Clean Catch (Midstream)  Final Report (2022 00:50):    No growth    Culture - Blood (collected 2022 16:40)  Source: .Blood Blood  Preliminary Report (2022 03:01):    No growth to date.    Culture - Blood (collected 2022 16:40)  Source: .Blood Blood  Preliminary Report (2022 03:01):    No growth to date.      CARDIAC MARKERS ( 2022 16:20 )  x     / 0.11 ng/mL / x     / x     / x      CARDIAC MARKERS ( 2022 09:10 )  x     / 0.11 ng/mL / x     / x     / x      CARDIAC MARKERS ( 2022 16:54 )  x     / 0.01 ng/mL / x     / x     / x          RADIOLOGY:    PHYSICAL EXAM:  GEN: No acute distress  LUNGS: rhonchi low intensity b/l BS   HEART: Regular  ABD: Soft, non-tender, non-distended.  EXT: NC/NC/NE/2+PP/ARMENDARIZ/Skin Intact.   NEURO: AAOX3    Intravenous access:   NG tube:   Cunha Catheter:        SUBJECTIVE:    Patient is a 76y old Male who presents with a chief complaint of productive cough (2022 07:19)    Currently admitted to medicine with the primary diagnosis of Neutropenic fever       Today is hospital day 2d. This morning he is resting comfortably in bed and reports no new issues or overnight events.     PAST MEDICAL & SURGICAL HISTORY  HTN (hypertension)    HLD (hyperlipidemia)    No significant past surgical history      SOCIAL HISTORY:  Negative for smoking/alcohol/drug use.     ALLERGIES:  No Known Allergies    MEDICATIONS:  STANDING MEDICATIONS  aspirin enteric coated 81 milliGRAM(s) Oral daily  atorvastatin Oral Tab/Cap - Peds 40 milliGRAM(s) Oral daily  benzonatate 100 milliGRAM(s) Oral three times a day  dexAMETHasone     Tablet 4 milliGRAM(s) Oral two times a day  dextrose 5%. 1000 milliLiter(s) IV Continuous <Continuous>  dextrose 5%. 1000 milliLiter(s) IV Continuous <Continuous>  dextrose 50% Injectable 25 Gram(s) IV Push once  dextrose 50% Injectable 12.5 Gram(s) IV Push once  dextrose 50% Injectable 25 Gram(s) IV Push once  enoxaparin Injectable 40 milliGRAM(s) SubCutaneous every 24 hours  folic acid 1 milliGRAM(s) Oral daily  glucagon  Injectable 1 milliGRAM(s) IntraMuscular once  guaifenesin/dextromethorphan Oral Liquid 10 milliLiter(s) Oral every 6 hours  insulin glargine Injectable (LANTUS) 30 Unit(s) SubCutaneous every morning  insulin lispro (ADMELOG) corrective regimen sliding scale   SubCutaneous three times a day before meals  insulin lispro Injectable (ADMELOG) 5 Unit(s) SubCutaneous three times a day before meals  insulin regular  human recombinant. 10 Unit(s) IV Push once  lactated ringers. 1000 milliLiter(s) IV Continuous <Continuous>  lisinopril 5 milliGRAM(s) Oral daily  metoprolol succinate ER 25 milliGRAM(s) Oral daily  polyethylene glycol 3350 17 Gram(s) Oral daily  potassium chloride  20 mEq/100 mL IVPB 20 milliEquivalent(s) IV Intermittent every 2 hours    PRN MEDICATIONS  dextrose Oral Gel 15 Gram(s) Oral once PRN    VITALS:   T(F): 96.5  HR: 83  BP: 114/56  RR: 18  SpO2: 96%    LABS:                        8.9    9.21  )-----------( 129      ( 2022 06:49 )             27.0     06-07    128<L>  |  93<L>  |  49<H>  ----------------------------<  679<HH>  4.6   |  20  |  1.9<H>    Ca    8.7      2022 06:49  Mg     2.0     06-07    TPro  5.5<L>  /  Alb  3.1<L>  /  TBili  0.3  /  DBili  x   /  AST  26  /  ALT  29  /  AlkPhos  116<H>  06-      Urinalysis Basic - ( 2022 20:57 )    Color: Yellow / Appearance: Clear / S.016 / pH: x  Gluc: x / Ketone: Negative  / Bili: Negative / Urobili: <2 mg/dL   Blood: x / Protein: 100 mg/dL / Nitrite: Negative   Leuk Esterase: Negative / RBC: 0 /HPF / WBC 2 /HPF   Sq Epi: x / Non Sq Epi: 1 /HPF / Bacteria: Negative        Troponin T, Serum: 0.11 ng/mL *HH* (22 @ 16:20)      Culture - Urine (collected 2022 20:57)  Source: Clean Catch Clean Catch (Midstream)  Final Report (2022 00:50):    No growth    Culture - Blood (collected 2022 16:40)  Source: .Blood Blood  Preliminary Report (2022 03:01):    No growth to date.    Culture - Blood (collected 2022 16:40)  Source: .Blood Blood  Preliminary Report (2022 03:01):    No growth to date.      CARDIAC MARKERS ( 2022 16:20 )  x     / 0.11 ng/mL / x     / x     / x      CARDIAC MARKERS ( 2022 09:10 )  x     / 0.11 ng/mL / x     / x     / x      CARDIAC MARKERS ( 2022 16:54 )  x     / 0.01 ng/mL / x     / x     / x          RADIOLOGY:    PHYSICAL EXAM:  GEN: No acute distress  LUNGS: rhonchi low intensity b/l BS   HEART: RRR, 3/6 RSB murmur  ABD: Soft, non-tender, non-distended.  EXT: NC/NC/NE/2+PP/ARMENDARIZ/Skin Intact.   NEURO: AAOX3    Intravenous access:   NG tube:   Cunha Catheter:

## 2022-06-07 NOTE — PROGRESS NOTE ADULT - ASSESSMENT
77 y/o M w/ PMHx of NSCLC Ca Stage 3 on Chemo (last session 5/31), HTN, HLD presenting to ED for SOB and Cough.    #Community Acquired Pneumonia  #Septic on Admission  #CP likely 2/2 Coughing  - In ED, VS sig for TMax 102.3, , Placed on 3L NC O2 99%  - WBC count 3.98 on admission  - CXR - no evidence of consolidation  - c/w Cefepime for now  - F/u procal  - BLood Cx negative    #TALIA - prerenal  - Cr up from 1.3 to 1.9  - 2/2 glucosuria  - started on IVF 75cc/hr  - F/u urine lytes    #H/O HFpEF/CAD  - BNP on 2185, prior from 4/21 616  - Does not appear congested on PE  - TTE from 4/21: EF 64%, G1DD, Mod AS  - ECHO in this adm - EF 68%  - dcd lasix  - Trops 0.11--> f/u repeat trops, CK-MB --> if downtrended d/c tele      #Hyperglycemia/DM2  #Type 2 DM   - A1C 4/21 7.8  - Blood Glucose on admission 303 on CMP  - Home Meds: Metformin 500 mg BID  - This morning serum glucose in 600s s/p regular insulin 20U and lispro 8U  - Lantus 30U lispro 5/5/5U + SS  - AOx3  - Monitor FS, Keep 140-180, Adjust as needed    #Normocytic Anemia   - Hg 8.8 on admission, was 13 on 4/21  - Possibly 2/2 Chemo   - f/u Iron Studies, B12, Folate, TSH  - Trend CBC, Keep > 7, Transfuse as needed  - c/w Folic Acid 1 mg qD    #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 Avila (Hem/Onc) and Dr. Frey (Rad/Onc)  - f/u Hem/Onc OP    #Hypertension  #Hyperlipidemia   - Normotensive in ED  - c/w Home Meds: ASA 81 mg qD, Atorvastatin 40 mg qD  - c/w Lisinopril 10 mg qD    Diet: DASH, CC  DVT ppx: Lovenox  GI ppx: Protonix  Dispo: pending TALIA improvement and glucose control     75 y/o M w/ PMHx of NSCLC Ca Stage 3 on Chemo (last session 5/31), HTN, HLD presenting to ED for SOB and Cough.    #Community Acquired Pneumonia  #Septic on Admission  #CP likely 2/2 Coughing  - CXR - no evidence of consolidation  - c/w Cefepime for now  - F/u procal  - BLood Cx negative    #TALIA - prerenal  - Cr up from 1.3 to 1.9  - 2/2 glucosuria  - started on IVF 75cc/hr  - F/u urine lytes    #H/O HFpEF/CAD  - BNP on 2185, prior from 4/21 616  - Does not appear congested on PE  - TTE from 4/21: EF 64%, G1DD, Mod AS  - ECHO in this adm - EF 68%  - dcd lasix  - Trops 0.11--> f/u repeat trops, CK-MB --> if downtrended d/c tele      #Hyperglycemia/DM2  #Type 2 DM   - A1C 4/21 7.8  - Blood Glucose on admission 303 on CMP  - Home Meds: Metformin 500 mg BID  - This morning serum glucose in 600s s/p regular insulin 20U and lispro 8U  - Lantus 30U lispro 5/5/5U + SS  will adjust in am to control  to 150  - AOx3  - Monitor FS, Keep 140-180, Adjust as needed    #Normocytic Anemia   - Hg 8.8 on admission, was 13 on 4/21  - Possibly 2/2 Chemo   - f/u Iron Studies, B12, Folate, TSH  - Trend CBC, Keep > 7, Transfuse as needed  - c/w Folic Acid 1 mg qD    #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 Avila (Hem/Onc) and Dr. Frey (Rad/Onc)    #  AS/CAD   - clinically stabe, no acute issues at this time                 - f/u Hem/Onc OP    #Hypertension  #Hyperlipidemia   - Normotensive in ED  - c/w Home Meds: ASA 81 mg qD, Atorvastatin 40 mg qD  - c/w Lisinopril 10 mg qD    Diet: DASH, CC  DVT ppx: Lovenox  GI ppx: Protonix  Dispo: pending TALIA improvement and glucose control

## 2022-06-08 NOTE — PROGRESS NOTE ADULT - ASSESSMENT
ASSESSMENT & PLAN    75 y/o M w/ PMHx of NSCLC Ca Stage 3 on Chemo (last session 5/31), HTN, HLD presenting to ED for SOB and Cough.    #Community Acquired Pneumonia  #Septic on Admission: T: 102.3F, HR: 113  #CP likely 2/2 Coughing  - CXR - RUL opacity perihilar, more pulm vasc congestion on 6/6  - c/w Cefepime for now  - procal- 1.22  - Blood Cx NGTD, UCx NGTD, Legionella (-)    #TALIA - prerenal FeNa 0.7  - Cr up from 1.3 to 1.9  - 2/2 glucosuria  - c/w IVF 75cc/hr      #H/O HFpEF/CAD  - BNP on 2185, prior from 4/21 616  - Does not appear congested on PE  - TTE from 4/21: EF 64%, G1DD, Mod AS  - ECHO in this adm - EF 68%  - dcd lasix  - Trops 0.11-->0.06. CKMB 2.8. Currently no CP symptoms. Lactate 1.8  - EKG sinus rhythm, tachy, no ischemic changes      #Hyperglycemia/DM2  #Type 2 DM   - A1C 4/21 7.8  - Blood Glucose on admission 303 on CMP  - Home Meds: Metformin 500 mg BID  - 6/7- serum glucose in 600s s/p regular insulin 20U and lispro 8U. 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 Avila (Hem/Onc) and Dr. Frey (Rad/Onc)    #  AS/CAD   - clinically stabe, no acute issues at this time                 - f/u Hem/Onc OP    #Hypertension  #Hyperlipidemia   - Normotensive in ED  - c/w Home Meds: ASA 81 mg qD, Atorvastatin 40 mg qD  - c/w Lisinopril 10 mg qD    Diet: DASH, CC  DVT ppx: Lovenox  GI ppx: Protonix  Dispo: pending TALIA improvement and glucose control           ASSESSMENT & PLAN    77 y/o M w/ PMHx of NSCLC Ca Stage 3 on Chemo (last session 5/31), HTN, HLD presenting to ED for SOB and Cough.    #Community Acquired Pneumonia  #Septic on Admission: T: 102.3F, HR: 113  #CP likely 2/2 Coughing  - CXR - RUL opacity perihilar, more pulm vasc congestion on 6/6  - c/w Cefepime for now  - procal- 1.22  - Blood Cx NGTD, UCx NGTD, Legionella (-)  - started duoneb q6H for wheezing    #TALIA - prerenal FeNa 0.7  - Cr up from 1.3 to 1.9  - 2/2 glucosuria  - c/w IVF 75cc/hr      #H/O HFpEF/CAD  - BNP on 2185, prior from 4/21 616  - Does not appear congested on PE  - TTE from 4/21: EF 64%, G1DD, Mod AS  - ECHO in this adm - EF 68%  - dcd lasix  - Trops 0.11-->0.06. CKMB 2.8. Currently no CP symptoms. Lactate 1.8  - EKG sinus rhythm, tachy, no ischemic changes      #Hyperglycemia/DM2  #Type 2 DM   - A1C 4/21 7.8  - Blood Glucose on admission 303 on CMP  - Home Meds: Metformin 500 mg BID  - 6/7- serum glucose in 600s s/p regular insulin 20U and lispro 8U. 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 Avila (Hem/Onc) and Dr. Frey (Rad/Onc)    #  AS/CAD   - clinically stabe, no acute issues at this time                 - f/u Hem/Onc OP    #Hypertension  #Hyperlipidemia   - Normotensive in ED  - c/w Home Meds: ASA 81 mg qD, Atorvastatin 40 mg qD  - c/w Lisinopril 10 mg qD    Diet: DASH, CC  DVT ppx: Lovenox  GI ppx: Protonix  Dispo: pending TALIA improvement and glucose control           ASSESSMENT & PLAN    75 y/o M w/ PMHx of NSCLC Ca Stage 3 on Chemo (last session 5/31), HTN, HLD presenting to ED for SOB and Cough.    #Community Acquired Pneumonia  #Septic on Admission: T: 102.3F, HR: 113  #CP likely 2/2 Coughing  - CXR - RUL opacity perihilar, more pulm vasc congestion on 6/6  - MRSA (-)  - c/w Cefepime for now  - procal- 1.22  - consult ID  - Blood Cx NGTD, UCx NGTD, Legionella (-)    #TALIA - prerenal FeNa 0.7  - Cr up from 1.3 to 1.9  - 2/2 glucosuria  - c/w IVF 75cc/hr      #H/O HFpEF/CAD  - BNP on 2185, prior from 4/21 616  - Does not appear congested on PE  - TTE from 4/21: EF 64%, G1DD, Mod AS  - ECHO in this adm - EF 68%  - dcd lasix  - Trops 0.11-->0.06. CKMB 2.8. Currently no CP symptoms. Lactate 1.8  - EKG sinus rhythm, tachy, no ischemic changes      #Hyperglycemia/DM2  #Type 2 DM   - A1C 4/21 7.8  - Blood Glucose on admission 303 on CMP  - Home Meds: Metformin 500 mg BID  - 6/7- serum glucose in 600s s/p regular insulin 20U and lispro 8U. 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 Avila (Hem/Onc) and Dr. Frey (Rad/Onc)    #  AS/CAD   - clinically stabe, no acute issues at this time                 - f/u Hem/Onc OP    #Hypertension  #Hyperlipidemia   - Normotensive in ED  - c/w Home Meds: ASA 81 mg qD, Atorvastatin 40 mg qD  - c/w Lisinopril 10 mg qD    Diet: DASH, CC  DVT ppx: Lovenox  GI ppx: Protonix  Dispo: pending TALIA improvement and glucose control           ASSESSMENT & PLAN    77 y/o M w/ PMHx of NSCLC Ca Stage 3 on Chemo (last session 5/31), HTN, HLD presenting to ED for SOB and Cough.    #Community Acquired Pneumonia  #Septic on Admission: T: 102.3F, HR: 113  #CP likely 2/2 Coughing  - CXR - RUL opacity perihilar, more pulm vasc congestion on 6/6  - MRSA (-)  - c/w Cefepime for now  - procal- 1.22  - consult ID  - Blood Cx NGTD, UCx NGTD, Legionella (-)  - clinically doing much better.  - d/w resident throughout the day plan of care  would not change abx at this point     #TALIA - prerenal FeNa 0.7  - Cr up from 1.3 to 1.9, check bmp in am  - 2/2 glucosuria  - c/w IVF 75cc/hr      #H/O HFpEF/CAD  - BNP on 2185, prior from 4/21 616  - Does not appear congested on PE  - TTE from 4/21: EF 64%, G1DD, Mod AS  - ECHO in this adm - EF 68%  - dcd lasix  - Trops 0.11-->0.06. CKMB 2.8. Currently no CP symptoms. Lactate 1.8  - EKG sinus rhythm, tachy, no ischemic changes      #Hyperglycemia/DM2  #Type 2 DM   - A1C 4/21 7.8  - Blood Glucose on admission 303 on CMP  - Home Meds: Metformin 500 mg BID  - 6/7- serum glucose in 600s s/p regular insulin 20U and lispro 8U. 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 Avila (Hem/Onc) and Dr. Frey (Rad/Onc)    #  AS/CAD   - clinically stabe, no acute issues at this time                 - f/u Hem/Onc OP    #Hypertension  #Hyperlipidemia   - Normotensive in ED  - c/w Home Meds: ASA 81 mg qD, Atorvastatin 40 mg qD  - c/w Lisinopril 10 mg qD    Diet: DASH, CC  DVT ppx: Lovenox  GI ppx: Protonix  Dispo: pending TALIA improvement and glucose control

## 2022-06-08 NOTE — DISCHARGE NOTE NURSING/CASE MANAGEMENT/SOCIAL WORK - NSDCPEFALRISK_GEN_ALL_CORE
For information on Fall & Injury Prevention, visit: [email or web address] OR  [email or web address] OR  https://www.cdc.gov/steadi/patient.html

## 2022-06-08 NOTE — DISCHARGE NOTE NURSING/CASE MANAGEMENT/SOCIAL WORK - PATIENT PORTAL LINK FT
You can access the FollowMyHealth Patient Portal offered by [facility] by registering at the following website: [email or web address]. By joining [facility]’s FollowMyHealth portal, you will also be able to view your health information using other applications (apps) compatible with our system.

## 2022-06-08 NOTE — PROGRESS NOTE ADULT - SUBJECTIVE AND OBJECTIVE BOX
NAHED CASTANEDA 76y Male  MRN#: [id number]   CODE STATUS: full    Hospital Day: 3d    Pt is currently admitted with the primary diagnosis of pneumonia    SUBJECTIVE  Hospital Course    Overnight events: No acute events overnight. Patient was transfer from telemetry to general medicine floor. Patient denies complaints today. Sugars elevated despite insulin adjustments. Readjusted insulin regimen today.     Subjective complaints                                                 ----------------------------------------------------------  OBJECTIVE  PAST MEDICAL & SURGICAL HISTORY  HTN (hypertension)    HLD (hyperlipidemia)    No significant past surgical history                                              -----------------------------------------------------------  ALLERGIES:  No Known Allergies                                            ------------------------------------------------------------    HOME MEDICATIONS  Home Medications:  atorvastatin 40 mg oral tablet: 1 tab(s) orally once a day (28 Apr 2021 23:38)  dexamethasone 4 mg oral tablet: 1 tab(s) orally 2 times a day (06 Jun 2022 02:49)  dronabinol 2.5 mg oral capsule: 1 cap(s) orally 2 times a day (06 Jun 2022 02:49)  folic acid 1 mg oral tablet: 1 tab(s) orally once a day (06 Jun 2022 02:48)  lisinopril 5 mg oral tablet: 1 tab(s) orally once a day (28 Apr 2021 23:38)  metFORMIN 500 mg oral tablet: 1 tab(s) orally 2 times a day (28 Apr 2021 23:38)  METOPROLOL SUCCINATE ER  25 MG TB24: 1 tab(s) orally once a day (28 Apr 2021 23:38)  prochlorperazine 10 mg oral tablet: 1 tab(s) orally every 6 hours (06 Jun 2022 02:48)  Zofran 8 mg oral tablet: 1 tab(s) orally 3 times a day (06 Jun 2022 02:49)                           MEDICATIONS:  STANDING MEDICATIONS  albuterol/ipratropium for Nebulization 3 milliLiter(s) Nebulizer every 6 hours  aspirin enteric coated 81 milliGRAM(s) Oral daily  atorvastatin Oral Tab/Cap - Peds 40 milliGRAM(s) Oral daily  benzonatate 100 milliGRAM(s) Oral three times a day  cefepime   IVPB 1000 milliGRAM(s) IV Intermittent every 24 hours  dexAMETHasone     Tablet 4 milliGRAM(s) Oral two times a day  dextrose 5%. 1000 milliLiter(s) IV Continuous <Continuous>  dextrose 5%. 1000 milliLiter(s) IV Continuous <Continuous>  dextrose 50% Injectable 25 Gram(s) IV Push once  dextrose 50% Injectable 12.5 Gram(s) IV Push once  dextrose 50% Injectable 25 Gram(s) IV Push once  enoxaparin Injectable 40 milliGRAM(s) SubCutaneous every 24 hours  folic acid 1 milliGRAM(s) Oral daily  glucagon  Injectable 1 milliGRAM(s) IntraMuscular once  guaifenesin/dextromethorphan Oral Liquid 10 milliLiter(s) Oral every 6 hours  insulin glargine Injectable (LANTUS) 42 Unit(s) SubCutaneous at bedtime  insulin lispro (ADMELOG) corrective regimen sliding scale   SubCutaneous three times a day before meals  insulin lispro Injectable (ADMELOG) 9 Unit(s) SubCutaneous three times a day before meals  lactated ringers. 1000 milliLiter(s) IV Continuous <Continuous>  lisinopril 5 milliGRAM(s) Oral daily  metoprolol succinate ER 25 milliGRAM(s) Oral daily  polyethylene glycol 3350 17 Gram(s) Oral daily    PRN MEDICATIONS  dextrose Oral Gel 15 Gram(s) Oral once PRN                                            ------------------------------------------------------------  VITAL SIGNS: Last 24 Hours  T(C): 35.6 (08 Jun 2022 05:00), Max: 35.9 (07 Jun 2022 20:53)  T(F): 96.1 (08 Jun 2022 05:00), Max: 96.7 (07 Jun 2022 20:53)  HR: 82 (08 Jun 2022 05:00) (82 - 89)  BP: 119/59 (08 Jun 2022 05:00) (119/59 - 124/60)  BP(mean): 85 (08 Jun 2022 05:00) (85 - 85)  RR: 18 (08 Jun 2022 05:00) (18 - 19)  SpO2: 96% (07 Jun 2022 20:53) (96% - 96%)      06-07-22 @ 07:01  -  06-08-22 @ 07:00  --------------------------------------------------------  IN: 266 mL / OUT: 315 mL / NET: -49 mL                                             --------------------------------------------------------------  LABS:                        8.9    9.21  )-----------( 129      ( 07 Jun 2022 06:49 )             27.0     06-07    130<L>  |  91<L>  |  49<H>  ----------------------------<  431<H>  4.1   |  19  |  1.9<H>    Ca    9.3      07 Jun 2022 12:19  Mg     2.0     06-07    TPro  5.5<L>  /  Alb  3.1<L>  /  TBili  0.3  /  DBili  x   /  AST  26  /  ALT  29  /  AlkPhos  116<H>  06-07          Troponin T, Serum: 0.06 ng/mL *HH* (06-07-22 @ 12:19)        Culture - Blood (collected 06 Jun 2022 09:00)  Source: .Blood None  Preliminary Report (07 Jun 2022 19:02):    No growth to date.    Culture - Urine (collected 05 Jun 2022 20:57)  Source: Clean Catch Clean Catch (Midstream)  Final Report (07 Jun 2022 00:50):    No growth    Culture - Blood (collected 05 Jun 2022 16:40)  Source: .Blood Blood  Preliminary Report (07 Jun 2022 03:01):    No growth to date.    Culture - Blood (collected 05 Jun 2022 16:40)  Source: .Blood Blood  Preliminary Report (07 Jun 2022 03:01):    No growth to date.        CARDIAC MARKERS ( 07 Jun 2022 12:19 )  x     / 0.06 ng/mL / x     / x     / 2.8 ng/mL  CARDIAC MARKERS ( 06 Jun 2022 16:20 )  x     / 0.11 ng/mL / x     / x     / x                                                  -------------------------------------------------------------  RADIOLOGY:                                            --------------------------------------------------------------    PHYSICAL EXAM:  General: Well appearing, not in acute distress  HEENT: No cervical LAD, or JVD  LUNGS: CTAB, expiratory wheezing b/l, on RA  HEART: RRR, no murmur  ABDOMEN: NBS, soft, nontender to palpation  EXT: no peripheral pitting edema b/l  NEURO: AAOx3  SKIN: No skin rash, open wounds                                           --------------------------------------------------------------

## 2022-06-09 NOTE — CONSULT NOTE ADULT - ASSESSMENT
ASSESSMENT  76yMale with a PMH of....... (fill in)    IMPRESSION  #   #  #    RECOMMENDATIONS  - f/u pending cultures  - ***    This is a pended note. All final recommendations to follow pending discussion with ID Attending    ASSESSMENT  77 y/o M w/ PMHx of NSCLC Ca Stage 3 on Chemo (last session 5/31), HTN, HLD presenting to ED for SOB and Cough. Patient states that for the last several days ago w/ persistent productive cough of whitish sputum, 1 table spoon amount, lasts 15 min, recurs every 5-10 min no blood noted in cough. Cough a/w CP, non-radiating, only present whilst coughing, not present at rest, or related to exertion or respiration.     IMPRESSION  # Post obstructive PNA      -X-ray findings equivocal for PNA      RECOMMENDATIONS  - Please order CT scan chest  - C/w Cefepime   - Pt is high risk for DVTs, send LE duplex and d- dimers to rule out DVTs     ASSESSMENT  77 y/o M w/ PMHx of NSCLC Ca Stage 3 on Chemo (last session 5/31), HTN, HLD presenting to ED for SOB and Cough. Patient states that for the last several days ago w/ persistent productive cough of whitish sputum, 1 table spoon amount, lasts 15 min, recurs every 5-10 min no blood noted in cough. Cough a/w CP, non-radiating, only present whilst coughing, not present at rest, or related to exertion or respiration.     IMPRESSION  # Post obstructive PNA      -X-ray findings equivocal for PNA      - procal 1.22      RECOMMENDATIONS  - Please order CT scan chest  - F/u sputum cultures   - C/w Cefepime   - Pt is high risk for DVT, send LE duplex and d-dimers to rule out DVT     ASSESSMENT  75 y/o M w/ PMHx of NSCLC Ca Stage 3 on Chemo (last session 5/31), HTN, HLD presenting to ED for SOB and Cough. Patient states that for the last several days ago w/ persistent productive cough of whitish sputum, 1 table spoon amount, lasts 15 min, recurs every 5-10 min no blood noted in cough. Cough a/w CP, non-radiating, only present whilst coughing, not present at rest, or related to exertion or respiration.     IMPRESSION  # Post obstructive PNA      - procal 1.22      RECOMMENDATIONS  - Please order CT scan chest as xray is equivocal  - F/u sputum cultures   - C/w Cefepime   - Pt is high risk for DVT, send LE duplex and d-dimers to rule out DVT     ASSESSMENT  77 y/o M w/ PMHx of NSCLC Ca Stage 3 on Chemo (last session 5/31), HTN, HLD presenting to ED for SOB and Cough. Patient states that for the last several days ago w/ persistent productive cough of whitish sputum, 1 table spoon amount, lasts 15 min, recurs every 5-10 min no blood noted in cough. Cough a/w CP, non-radiating, only present whilst coughing, not present at rest, or related to exertion or respiration.     IMPRESSION  # Post obstructive PNA      - procal 1.22      RECOMMENDATIONS  - Please order CT scan chest as xray is equivocal  - F/u sputum cultures   - C/w Cefepime - INCREASE to 2g q12h IV  - Pt is high risk for DVT, send LE duplex and d-dimers to rule out DVT

## 2022-06-09 NOTE — CONSULT NOTE ADULT - SUBJECTIVE AND OBJECTIVE BOX
TONYA, NAHED  76y, Male  Allergy: No Known Allergies      CHIEF COMPLAINT: productive cough (06 Jun 2022 07:19)      HPI:  76yMale with a past medical history of ***(fill in)    Infectious Diseases History:  Old Micro Data/Cultures:     FAMILY HISTORY:  No pertinent family history in first degree relatives      PAST MEDICAL & SURGICAL HISTORY:  HTN (hypertension)      HLD (hyperlipidemia)      No significant past surgical history          SOCIAL HISTORY  Social History:      Recent Travel:  Other Exposures:     ROS  General: Denies rigors, nightsweats  HEENT: Denies headache, rhinorrhea, sore throat, eye pain  CV: Denies CP, palpitations  PULM: Denies wheezing, hemoptysis  GI: Denies hematemesis, hematochezia, melena  : Denies discharge, hematuria  MSK: Denies arthralgias, myalgias  SKIN: Denies rash, lesions  NEURO: Denies paresthesias, weakness  PSYCH: Denies depression, anxiety    VITALS:  T(F): 99.8, Max: 99.8 (06-09-22 @ 05:13)  HR: 83  BP: 145/56  RR: 18Vital Signs Last 24 Hrs  T(C): 37.7 (09 Jun 2022 05:13), Max: 37.7 (09 Jun 2022 05:13)  T(F): 99.8 (09 Jun 2022 05:13), Max: 99.8 (09 Jun 2022 05:13)  HR: 83 (09 Jun 2022 05:13) (83 - 109)  BP: 145/56 (09 Jun 2022 05:13) (103/59 - 145/56)  BP(mean): --  RR: 18 (09 Jun 2022 05:13) (18 - 18)  SpO2: --    PHYSICAL EXAM:  ***    TESTS & MEASUREMENTS:                        8.4    16.91 )-----------( 205      ( 09 Jun 2022 07:30 )             24.8     06-09    133<L>  |  99  |  58<H>  ----------------------------<  423<H>  5.4<H>   |  20  |  1.8<H>    Ca    9.0      09 Jun 2022 07:30  Mg     1.8     06-09    TPro  5.3<L>  /  Alb  2.9<L>  /  TBili  0.3  /  DBili  x   /  AST  16  /  ALT  31  /  AlkPhos  103  06-09      LIVER FUNCTIONS - ( 09 Jun 2022 07:30 )  Alb: 2.9 g/dL / Pro: 5.3 g/dL / ALK PHOS: 103 U/L / ALT: 31 U/L / AST: 16 U/L / GGT: x               Culture - Blood (collected 06-06-22 @ 09:00)  Source: .Blood None  Preliminary Report (06-07-22 @ 19:02):    No growth to date.    Culture - Urine (collected 06-05-22 @ 20:57)  Source: Clean Catch Clean Catch (Midstream)  Final Report (06-07-22 @ 00:50):    No growth    Culture - Blood (collected 06-05-22 @ 16:40)  Source: .Blood Blood  Preliminary Report (06-07-22 @ 03:01):    No growth to date.    Culture - Blood (collected 06-05-22 @ 16:40)  Source: .Blood Blood  Preliminary Report (06-07-22 @ 03:01):    No growth to date.        Blood Gas Venous - Lactate: 1.80 mmol/L (06-05-22 @ 17:56)      INFECTIOUS DISEASES TESTING  MRSA PCR Result.: Negative (06-07-22 @ 06:50)  Legionella Antigen, Urine: Negative (06-07-22 @ 06:49)      RADIOLOGY & ADDITIONAL TESTS:  I have personally reviewed the last available Chest xray  CXR      CT      CARDIOLOGY TESTING  12 Lead ECG:   Ventricular Rate 114 BPM    Atrial Rate 113 BPM    QRS Duration 100 ms    Q-T Interval 340 ms    QTC Calculation(Bazett) 468 ms    R Axis -60 degrees    T Axis 87 degrees    Diagnosis Line Accelerated Junctional rhythm  Left anterior fascicular block  Abnormal ECG    Confirmed by JIM MALCOLM MD ([id number]) on 6/6/2022 8:21:48 AM (06-06-22 @ 02:22)  12 Lead ECG:   Ventricular Rate 162 BPM    Atrial Rate 163 BPM    QRS Duration 92 ms    Q-T Interval 272 ms    QTC Calculation(Bazett) 446 ms    R Axis -64 degrees    T Axis 83 degrees    Diagnosis Line Supraventricular tachycardia with Premature ventricular complexes or Fusion  complexes  Left axis deviation  Septal infarct , age undetermined  Abnormal ECG    Confirmed by JIM MALCOLM MD ([id number]) on 6/6/2022 8:21:48 AM (06-06-22 @ 01:07)      All available historical records have been reviewed    MEDICATIONS  aspirin enteric coated 81  atorvastatin Oral Tab/Cap - Peds 40  benzonatate 100  cefepime   IVPB 1000  dextrose 5%. 1000  dextrose 5%. 1000  dextrose 50% Injectable 25  dextrose 50% Injectable 12.5  dextrose 50% Injectable 25  enoxaparin Injectable 40  folic acid 1  glucagon  Injectable 1  guaifenesin/dextromethorphan Oral Liquid 10  insulin glargine Injectable (LANTUS) 60  insulin lispro (ADMELOG) corrective regimen sliding scale   insulin lispro Injectable (ADMELOG) 20  insulin regular  human recombinant. 10  lactated ringers. 1000  lactulose Syrup 10  lisinopril 5  metoprolol succinate ER 25  polyethylene glycol 3350 17      ANTIBIOTICS:  cefepime   IVPB 1000 milliGRAM(s) IV Intermittent every 24 hours      All available historical data has been reviewed    ASSESSMENT  76yMale with a PMH of....... (fill in)    IMPRESSION  #   #  #    RECOMMENDATIONS  - f/u pending cultures  - ***    This is a pended note. All final recommendations to follow pending discussion with ID Attending    NAHED CASTANEDA  76y, Male  Allergy: No Known Allergies      CHIEF COMPLAINT: productive cough (06 Jun 2022 07:19)      HPI:  77 y/o M w/ PMHx of NSCLC Ca Stage 3 on Chemo (last session 5/31), HTN, HLD presenting to ED for SOB and Cough. Patient states that for the last several days ago w/ persistent productive cough of whitish sputum, 1 table spoon amount, lasts 15 min, recurs every 5-10 min no blood noted in cough. Cough a/w CP, non-radiating, only present whilst coughing, not present at rest, or related to exertion or respiration. Patient has also been having fevers, as high as 103 F, and SOB. Pulse Ox at home 94-95. Sx progressively worsening so decided to come to ED. Patient also states he has been urinating every few hours, but denies any dysuria. Patient follows w/ Dr. Avila, on chemo and immunotherapy, last session 5/31/22. Denies any OSBORNE, Chills, N/V/D/C, abd pain, and LE pain.     In ED, VS sig for TMax 102.3, , Placed on 3L NC. Labs sig 8.8, Na 131, bG 303, Mg 1.7. BNP 2186, VBG wnl. CXR appears to show pulmonary vascular congestion. Given dose of Cefepime and Azithromycin. Admitted to Medicine      Infectious Diseases History:  Old Micro Data/Cultures:     FAMILY HISTORY:  No pertinent family history in first degree relatives      PAST MEDICAL & SURGICAL HISTORY:  HTN (hypertension)      HLD (hyperlipidemia)      No significant past surgical history          SOCIAL HISTORY  Social History:      Recent Travel:  Other Exposures:     ROS  General: Denies rigors, nightsweats  HEENT: Denies headache, rhinorrhea, sore throat, eye pain  CV: Denies CP, palpitations  PULM: Denies wheezing, hemoptysis  GI: Denies hematemesis, hematochezia, melena  : Denies discharge, hematuria  MSK: Denies arthralgias, myalgias  SKIN: Denies rash, lesions  NEURO: Denies paresthesias, weakness  PSYCH: Denies depression, anxiety    VITALS:  T(F): 99.8, Max: 99.8 (06-09-22 @ 05:13)  HR: 83  BP: 145/56  RR: 18Vital Signs Last 24 Hrs  T(C): 37.7 (09 Jun 2022 05:13), Max: 37.7 (09 Jun 2022 05:13)  T(F): 99.8 (09 Jun 2022 05:13), Max: 99.8 (09 Jun 2022 05:13)  HR: 83 (09 Jun 2022 05:13) (83 - 109)  BP: 145/56 (09 Jun 2022 05:13) (103/59 - 145/56)  BP(mean): --  RR: 18 (09 Jun 2022 05:13) (18 - 18)  SpO2: --    PHYSICAL EXAM:  GENERAL: NAD, lying in bed comfortably  HEAD:  Atraumatic, Normocephalic  EYES: EOMI, PERRLA, conjunctiva and sclera clear  ENT: Moist mucous membranes  NECK: Supple, No JVD  CHEST/LUNG: Clear to auscultation bilaterally; No rales, rhonchi, wheezing, or rubs. Unlabored respirations  HEART: Regular rate and rhythm; No murmurs, rubs, or gallops  ABDOMEN: Bowel sounds present; Soft, Nontender, Nondistended. No hepatomegally  EXTREMITIES:  2+ Peripheral Pulses, brisk capillary refill. No clubbing, cyanosis, or edema  NERVOUS SYSTEM:  Alert & Oriented X3, speech clear. No deficits   MSK: FROM all 4 extremities, full and equal strength  SKIN: No rashes or lesions    TESTS & MEASUREMENTS:                        8.4    16.91 )-----------( 205      ( 09 Jun 2022 07:30 )             24.8     06-09    133<L>  |  99  |  58<H>  ----------------------------<  423<H>  5.4<H>   |  20  |  1.8<H>    Ca    9.0      09 Jun 2022 07:30  Mg     1.8     06-09    TPro  5.3<L>  /  Alb  2.9<L>  /  TBili  0.3  /  DBili  x   /  AST  16  /  ALT  31  /  AlkPhos  103  06-09      LIVER FUNCTIONS - ( 09 Jun 2022 07:30 )  Alb: 2.9 g/dL / Pro: 5.3 g/dL / ALK PHOS: 103 U/L / ALT: 31 U/L / AST: 16 U/L / GGT: x               Culture - Blood (collected 06-06-22 @ 09:00)  Source: .Blood None  Preliminary Report (06-07-22 @ 19:02):    No growth to date.    Culture - Urine (collected 06-05-22 @ 20:57)  Source: Clean Catch Clean Catch (Midstream)  Final Report (06-07-22 @ 00:50):    No growth    Culture - Blood (collected 06-05-22 @ 16:40)  Source: .Blood Blood  Preliminary Report (06-07-22 @ 03:01):    No growth to date.    Culture - Blood (collected 06-05-22 @ 16:40)  Source: .Blood Blood  Preliminary Report (06-07-22 @ 03:01):    No growth to date.        Blood Gas Venous - Lactate: 1.80 mmol/L (06-05-22 @ 17:56)      INFECTIOUS DISEASES TESTING  MRSA PCR Result.: Negative (06-07-22 @ 06:50)  Legionella Antigen, Urine: Negative (06-07-22 @ 06:49)      RADIOLOGY & ADDITIONAL TESTS:  I have personally reviewed the last available Chest xray  CXR      CT      CARDIOLOGY TESTING  12 Lead ECG:   Ventricular Rate 114 BPM    Atrial Rate 113 BPM    QRS Duration 100 ms    Q-T Interval 340 ms    QTC Calculation(Bazett) 468 ms    R Axis -60 degrees    T Axis 87 degrees    Diagnosis Line Accelerated Junctional rhythm  Left anterior fascicular block  Abnormal ECG    Confirmed by JIM MALCOLM MD ([id number]) on 6/6/2022 8:21:48 AM (06-06-22 @ 02:22)  12 Lead ECG:   Ventricular Rate 162 BPM    Atrial Rate 163 BPM    QRS Duration 92 ms    Q-T Interval 272 ms    QTC Calculation(Bazett) 446 ms    R Axis -64 degrees    T Axis 83 degrees    Diagnosis Line Supraventricular tachycardia with Premature ventricular complexes or Fusion  complexes  Left axis deviation  Septal infarct , age undetermined  Abnormal ECG    Confirmed by JIM MALCOLM MD ([id number]) on 6/6/2022 8:21:48 AM (06-06-22 @ 01:07)        MEDICATIONS  aspirin enteric coated 81  atorvastatin Oral Tab/Cap - Peds 40  benzonatate 100  cefepime   IVPB 1000  dextrose 5%. 1000  dextrose 5%. 1000  dextrose 50% Injectable 25  dextrose 50% Injectable 12.5  dextrose 50% Injectable 25  enoxaparin Injectable 40  folic acid 1  glucagon  Injectable 1  guaifenesin/dextromethorphan Oral Liquid 10  insulin glargine Injectable (LANTUS) 60  insulin lispro (ADMELOG) corrective regimen sliding scale   insulin lispro Injectable (ADMELOG) 20  insulin regular  human recombinant. 10  lactated ringers. 1000  lactulose Syrup 10  lisinopril 5  metoprolol succinate ER 25  polyethylene glycol 3350 17      ANTIBIOTICS:  cefepime   IVPB 1000 milliGRAM(s) IV Intermittent every 24 hours

## 2022-06-09 NOTE — PROGRESS NOTE ADULT - SUBJECTIVE AND OBJECTIVE BOX
NAHED CASTANEDA 76y Male  MRN#: [id number]   CODE STATUS: full    Hospital Day: 4d    Pt is currently admitted with the primary diagnosis of pneumonia    SUBJECTIVE  Hospital Course    Overnight events: Sugars still elevated after adjusting glucose. Patient's AG has closed. Still requiring extra insulin coverage. Patient only complains has been going to bathroom frequently due to IVF.     Subjective complaints                                               ----------------------------------------------------------  OBJECTIVE  PAST MEDICAL & SURGICAL HISTORY  HTN (hypertension)    HLD (hyperlipidemia)    No significant past surgical history                                              -----------------------------------------------------------  ALLERGIES:  No Known Allergies                                            ------------------------------------------------------------    HOME MEDICATIONS  Home Medications:  atorvastatin 40 mg oral tablet: 1 tab(s) orally once a day (28 Apr 2021 23:38)  dexamethasone 4 mg oral tablet: 1 tab(s) orally 2 times a day (06 Jun 2022 02:49)  dronabinol 2.5 mg oral capsule: 1 cap(s) orally 2 times a day (06 Jun 2022 02:49)  folic acid 1 mg oral tablet: 1 tab(s) orally once a day (06 Jun 2022 02:48)  lisinopril 5 mg oral tablet: 1 tab(s) orally once a day (28 Apr 2021 23:38)  metFORMIN 500 mg oral tablet: 1 tab(s) orally 2 times a day (28 Apr 2021 23:38)  METOPROLOL SUCCINATE ER  25 MG TB24: 1 tab(s) orally once a day (28 Apr 2021 23:38)  prochlorperazine 10 mg oral tablet: 1 tab(s) orally every 6 hours (06 Jun 2022 02:48)  Zofran 8 mg oral tablet: 1 tab(s) orally 3 times a day (06 Jun 2022 02:49)                           MEDICATIONS:  STANDING MEDICATIONS  aspirin enteric coated 81 milliGRAM(s) Oral daily  atorvastatin Oral Tab/Cap - Peds 40 milliGRAM(s) Oral daily  benzonatate 100 milliGRAM(s) Oral three times a day  cefepime   IVPB 1000 milliGRAM(s) IV Intermittent every 24 hours  dextrose 5%. 1000 milliLiter(s) IV Continuous <Continuous>  dextrose 5%. 1000 milliLiter(s) IV Continuous <Continuous>  dextrose 50% Injectable 25 Gram(s) IV Push once  dextrose 50% Injectable 12.5 Gram(s) IV Push once  dextrose 50% Injectable 25 Gram(s) IV Push once  enoxaparin Injectable 40 milliGRAM(s) SubCutaneous every 24 hours  folic acid 1 milliGRAM(s) Oral daily  glucagon  Injectable 1 milliGRAM(s) IntraMuscular once  guaifenesin/dextromethorphan Oral Liquid 10 milliLiter(s) Oral every 6 hours  insulin glargine Injectable (LANTUS) 60 Unit(s) SubCutaneous at bedtime  insulin lispro (ADMELOG) corrective regimen sliding scale   SubCutaneous three times a day before meals  insulin lispro Injectable (ADMELOG) 20 Unit(s) SubCutaneous three times a day before meals  lactated ringers. 1000 milliLiter(s) IV Continuous <Continuous>  lisinopril 5 milliGRAM(s) Oral daily  metoprolol succinate ER 25 milliGRAM(s) Oral daily  polyethylene glycol 3350 17 Gram(s) Oral daily    PRN MEDICATIONS  dextrose Oral Gel 15 Gram(s) Oral once PRN                                            ------------------------------------------------------------  VITAL SIGNS: Last 24 Hours  T(C): 37.7 (09 Jun 2022 05:13), Max: 37.7 (09 Jun 2022 05:13)  T(F): 99.8 (09 Jun 2022 05:13), Max: 99.8 (09 Jun 2022 05:13)  HR: 83 (09 Jun 2022 05:13) (83 - 109)  BP: 145/56 (09 Jun 2022 05:13) (103/59 - 145/56)  BP(mean): --  RR: 18 (09 Jun 2022 05:13) (18 - 18)  SpO2: --      06-09-22 @ 07:01  -  06-09-22 @ 12:43  --------------------------------------------------------  IN: 0 mL / OUT: 310 mL / NET: -310 mL                                             --------------------------------------------------------------  LABS:                        8.4    16.91 )-----------( 205      ( 09 Jun 2022 07:30 )             24.8     06-09    133<L>  |  99  |  58<H>  ----------------------------<  423<H>  5.4<H>   |  20  |  1.8<H>    Ca    9.0      09 Jun 2022 07:30  Mg     1.8     06-09    TPro  5.3<L>  /  Alb  2.9<L>  /  TBili  0.3  /  DBili  x   /  AST  16  /  ALT  31  /  AlkPhos  103  06-09                                                              -------------------------------------------------------------  RADIOLOGY:                                            --------------------------------------------------------------    PHYSICAL EXAM:  General: Well appearing, not in acute distress  HEENT: No cervical LAD, or JVD  LUNGS: CTAB, no rales, on RA  HEART: RRR, no murmur  ABDOMEN: NBS, soft, nontender to palpation  EXT: no peripheral pitting edema b/l  NEURO: AAOx3  SKIN: No skin rash, open wounds                                           --------------------------------------------------------------

## 2022-06-09 NOTE — PROGRESS NOTE ADULT - ASSESSMENT
ASSESSMENT & PLAN    75 y/o M w/ PMHx of NSCLC Ca Stage 3 on Chemo (last session 5/31), HTN, HLD presenting to ED for SOB and Cough.    #Post obstructive pneumonia?  #Septic on Admission: T: 102.3F, HR: 113  #CP likely 2/2 Coughing  - CXR - RUL opacity perihilar, more pulm vasc congestion on 6/6  - MRSA (-)  - c/w Cefepime for now  - procal- 1.22  - f/u ID recs  - Blood Cx NGTD, UCx NGTD, Legionella (-)  - clinically doing much better.  - d/w resident throughout the day plan of care  would not change abx at this point     #TALIA - prerenal FeNa 0.7  - Cr up from 1.3 to 1.9, check bmp in am  - 2/2 glucosuria  - c/w IVF 100cc/hr      #H/O HFpEF/CAD  - BNP on 2185, prior from 4/21 616  - Does not appear congested on PE  - TTE from 4/21: EF 64%, G1DD, Mod AS  - ECHO in this adm - EF 68%  - dcd lasix  - Trops 0.11-->0.06. CKMB 2.8. Currently no CP symptoms. Lactate 1.8  - EKG sinus rhythm, tachy, no ischemic changes      #Hyperglycemia/DM2- 2/2 steroids  #Type 2 DM   - A1C 4/21 7.8  - Blood Glucose on admission 303 on CMP  - Home Meds: Metformin 500 mg BID  - 6/7- serum glucose in 600s s/p regular insulin 20U and lispro 8U. Was started on Lantus 30U lispro 5/5/5U + SS  - Increased lantus to 60U, Lispro to 20U TID, cont SS. Monitor glucose. AG has closed. Goal glucose < 200. F/u Endo recs. Currently off steroids. Tomorrow, will readjust insulin dose based on glucose levels.   - A1c- 8  - AOx3  - Monitor FS, Keep 140-180, Adjust as needed    #Normocytic Anemia   - Hg 8.8 on admission, was 13 on 4/21  - Possibly 2/2 Chemo   - f/u Iron Studies, B12, Folate, TSH  - Trend CBC, Keep > 7, Transfuse as needed  - c/w Folic Acid 1 mg qD    #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 Avila (Hem/Onc) and Dr. Frey (Rad/Onc)    #  AS/CAD   - clinically stabe, no acute issues at this time                 - f/u Hem/Onc OP    #Hypertension  #Hyperlipidemia   - Normotensive in ED  - c/w Home Meds: ASA 81 mg qD, Atorvastatin 40 mg qD  - c/w Lisinopril 10 mg qD    Diet: DASH, CC  DVT ppx: Lovenox  GI ppx: Protonix  Dispo: pending TALIA improvement and glucose control

## 2022-06-09 NOTE — CONSULT NOTE ADULT - ATTENDING COMMENTS
I have personally seen and examined this patient.  I have fully participated in the care of this patient.  I have reviewed all pertinent clinical information, including history, physical exam, plan and note.  I have reviewed all pertinent clinical information and reviewed all relevant imaging and diagnostic studies personally.  Corrections and edits were made wherever needed.       #Suspected post-obstructive rule out GNR PNA in the setting of lung ca on chemo  Sepsis on admission T>101 P>90 WBC 3-->15  #Hyponatremia    Creatinine, Serum: 1.8 mg/dL (06.09.22 @ 07:30)    - Cefepime 2g q12h IV  - sputum cx  - CT wo chest    If any questions, please call or send a message on [employer] Teams  Please continue to update ID with any pertinent new laboratory or radiographic findings  Spectra [id number]

## 2022-06-09 NOTE — PROGRESS NOTE ADULT - ATTENDING COMMENTS
above noted, doing better  BG not well controlled but 2/2 recent dexamethasone use.  will correct itself in a couple of days   insulin as above as d/w resident.
note corrected  as appropriate  plan as above
case d/w resident multiple times, doing better  note corrected as appropriate

## 2022-06-09 NOTE — CONSULT NOTE ADULT - ASSESSMENT
Type 2 DM-uncontrolled.  Agree with increase in insulin doses-anticipate better glycemic control with discontinuation of steroids.  Continue IVF.  Follow anion gap.

## 2022-06-09 NOTE — CONSULT NOTE ADULT - SUBJECTIVE AND OBJECTIVE BOX
HPI: 76y Male with h/o type 2 DM (non-insulin requiring at home) now with BS> 400 in setting of pneumonia and IV steroid therapy--of note dexamethasone just discontinued.  Pt notes polyuria and polydipsia.  Insulin doses just increased today.  On IVF.    PAST MEDICAL & SURGICAL HISTORY:  HTN (hypertension)      HLD (hyperlipidemia)      No significant past surgical history        FAMILY HISTORY:  No pertinent family history in first degree relatives        Home Medications:  atorvastatin 40 mg oral tablet: 1 tab(s) orally once a day (28 Apr 2021 23:38)  dexamethasone 4 mg oral tablet: 1 tab(s) orally 2 times a day (06 Jun 2022 02:49)  dronabinol 2.5 mg oral capsule: 1 cap(s) orally 2 times a day (06 Jun 2022 02:49)  folic acid 1 mg oral tablet: 1 tab(s) orally once a day (06 Jun 2022 02:48)  lisinopril 5 mg oral tablet: 1 tab(s) orally once a day (28 Apr 2021 23:38)  metFORMIN 500 mg oral tablet: 1 tab(s) orally 2 times a day (28 Apr 2021 23:38)  METOPROLOL SUCCINATE ER  25 MG TB24: 1 tab(s) orally once a day (28 Apr 2021 23:38)  prochlorperazine 10 mg oral tablet: 1 tab(s) orally every 6 hours (06 Jun 2022 02:48)  Zofran 8 mg oral tablet: 1 tab(s) orally 3 times a day (06 Jun 2022 02:49)      Current (Non-Endocrine) Meds:  aspirin enteric coated 81 milliGRAM(s) Oral daily  benzonatate 100 milliGRAM(s) Oral three times a day  cefepime   IVPB 1000 milliGRAM(s) IV Intermittent every 24 hours  dextrose 5%. 1000 milliLiter(s) IV Continuous <Continuous>  dextrose 5%. 1000 milliLiter(s) IV Continuous <Continuous>  enoxaparin Injectable 40 milliGRAM(s) SubCutaneous every 24 hours  folic acid 1 milliGRAM(s) Oral daily  guaifenesin/dextromethorphan Oral Liquid 10 milliLiter(s) Oral every 6 hours  lactated ringers. 1000 milliLiter(s) IV Continuous <Continuous>  lisinopril 5 milliGRAM(s) Oral daily  metoprolol succinate ER 25 milliGRAM(s) Oral daily  polyethylene glycol 3350 17 Gram(s) Oral daily      Current Endocrine Meds:   atorvastatin Oral Tab/Cap - Peds 40 milliGRAM(s) Oral daily  dextrose 50% Injectable 25 Gram(s) IV Push once  dextrose 50% Injectable 12.5 Gram(s) IV Push once  dextrose 50% Injectable 25 Gram(s) IV Push once  dextrose Oral Gel 15 Gram(s) Oral once PRN  glucagon  Injectable 1 milliGRAM(s) IntraMuscular once  insulin glargine Injectable (LANTUS) 60 Unit(s) SubCutaneous at bedtime  insulin lispro (ADMELOG) corrective regimen sliding scale   SubCutaneous three times a day before meals  insulin lispro Injectable (ADMELOG) 20 Unit(s) SubCutaneous three times a day before meals      Allergies:  No Known Allergies      Height (cm): 180.3 (06-06 @ 22:50)  Weight (kg): 91.4 (06-06 @ 22:50)  BMI (kg/m2): 28.1 (06-06 @ 22:50)    Vital Signs Last 24 Hrs  T(C): 36.5 (09 Jun 2022 12:00), Max: 37.7 (09 Jun 2022 05:13)  T(F): 97.7 (09 Jun 2022 12:00), Max: 99.8 (09 Jun 2022 05:13)  HR: 80 (09 Jun 2022 12:00) (80 - 83)  BP: 119/59 (09 Jun 2022 12:00) (103/59 - 145/56)  BP(mean): --  RR: 18 (09 Jun 2022 05:13) (18 - 18)  SpO2: --  Constitutional: WN/WD in NAD.   Neck: no thyromegaly or palpable thyroid nodules   Respiratory: lungs CTAB.  Cardiovascular: regular rate and rhythm, normal S1 and S2, no audible murmurs  GI: soft, NT/ND, no masses/HSM appreciated.  Ext: no edema, no ulcers, pedal pulses palpable bilaterally      CAPILLARY BLOOD GLUCOSE      POCT Blood Glucose.: 483 mg/dL (09 Jun 2022 11:53)  POCT Blood Glucose.: 461 mg/dL (09 Jun 2022 10:17)  POCT Blood Glucose.: 458 mg/dL (09 Jun 2022 07:41)  POCT Blood Glucose.: 481 mg/dL (09 Jun 2022 00:09)  POCT Blood Glucose.: 499 mg/dL (08 Jun 2022 21:03)  POCT Blood Glucose.: 554 mg/dL (08 Jun 2022 18:39)  POCT Blood Glucose.: 557 mg/dL (08 Jun 2022 17:17)  POCT Blood Glucose.: >600 mg/dL (08 Jun 2022 16:48)      LABS:                        8.4    16.91 )-----------( 205      ( 09 Jun 2022 07:30 )             24.8     06-09    133<L>  |  99  |  58<H>  ----------------------------<  423<H>  5.4<H>   |  20  |  1.8<H>    Ca    9.0      09 Jun 2022 07:30  Mg     1.8     06-09    TPro  5.3<L>  /  Alb  2.9<L>  /  TBili  0.3  /  DBili  x   /  AST  16  /  ALT  31  /  AlkPhos  103  06-09

## 2022-06-10 NOTE — PROGRESS NOTE ADULT - SUBJECTIVE AND OBJECTIVE BOX
Name: NAHED CASTANEDA  Age: 76y  Gender: Male    Pt was seen and examined.   c/o:  feeling better he says but not able to walk and sob    Allergies:  No Known Allergies      PHYSICAL EXAM:    Vitals:  T(C): 37.1 (06-10-22 @ 05:06), Max: 37.1 (06-10-22 @ 05:06)  HR: 85 (06-10-22 @ 05:06) (80 - 85)  BP: 125/59 (06-10-22 @ 05:06) (115/59 - 125/59)  RR: 18 (06-10-22 @ 05:06) (18 - 18)  SpO2: --  Wt(kg): --Vital Signs Last 24 Hrs  T(C): 37.1 (10 Steven 2022 05:06), Max: 37.1 (10 Steven 2022 05:06)  T(F): 98.8 (10 Steven 2022 05:06), Max: 98.8 (10 Steven 2022 05:06)  HR: 85 (10 Steven 2022 05:06) (80 - 85)  BP: 125/59 (10 Steven 2022 05:06) (115/59 - 125/59)  BP(mean): --  RR: 18 (10 Steven 2022 05:06) (18 - 18)  SpO2: --      NECK: Supple, No JVD  CHEST/LUNG: b/l wheezes and rales, diffuse  HEART: S1,S2, N1 Regular rate and rhythm; No murmurs, rubs, or gallops  ABDOMEN: Soft, Nontender, Nondistended; Bowel sounds present  EXTREMITIES:  2+ Peripheral Pulses, No clubbing, cyanosis, or edema      LABS:                        8.4    16.91 )-----------( 205      ( 09 Jun 2022 07:30 )             24.8     06-09    133<L>  |  99  |  58<H>  ----------------------------<  423<H>  5.4<H>   |  20  |  1.8<H>    Ca    9.0      09 Jun 2022 07:30  Mg     1.8     06-09    TPro  5.3<L>  /  Alb  2.9<L>  /  TBili  0.3  /  DBili  x   /  AST  16  /  ALT  31  /  AlkPhos  103  06-09    LIVER FUNCTIONS - ( 09 Jun 2022 07:30 )  Alb: 2.9 g/dL / Pro: 5.3 g/dL / ALK PHOS: 103 U/L / ALT: 31 U/L / AST: 16 U/L / GGT: x                 MEDICATIONS  (STANDING):  aspirin enteric coated 81 milliGRAM(s) Oral daily  atorvastatin Oral Tab/Cap - Peds 40 milliGRAM(s) Oral daily  benzonatate 100 milliGRAM(s) Oral three times a day  cefepime   IVPB 2000 milliGRAM(s) IV Intermittent every 12 hours  dextrose 5%. 1000 milliLiter(s) (100 mL/Hr) IV Continuous <Continuous>  dextrose 5%. 1000 milliLiter(s) (50 mL/Hr) IV Continuous <Continuous>  dextrose 50% Injectable 25 Gram(s) IV Push once  dextrose 50% Injectable 12.5 Gram(s) IV Push once  dextrose 50% Injectable 25 Gram(s) IV Push once  enoxaparin Injectable 40 milliGRAM(s) SubCutaneous every 24 hours  folic acid 1 milliGRAM(s) Oral daily  glucagon  Injectable 1 milliGRAM(s) IntraMuscular once  guaifenesin/dextromethorphan Oral Liquid 10 milliLiter(s) Oral every 6 hours  insulin glargine Injectable (LANTUS) 60 Unit(s) SubCutaneous at bedtime  insulin lispro (ADMELOG) corrective regimen sliding scale   SubCutaneous three times a day before meals  insulin lispro Injectable (ADMELOG) 20 Unit(s) SubCutaneous three times a day before meals  lactated ringers. 1000 milliLiter(s) (100 mL/Hr) IV Continuous <Continuous>  lisinopril 5 milliGRAM(s) Oral daily  metoprolol succinate ER 25 milliGRAM(s) Oral daily  polyethylene glycol 3350 17 Gram(s) Oral daily        RADIOLOGY & ADDITIONAL TESTS:    Imaging Personally Reviewed:  [ ] YES  [ ] NO    A/P:  75 y/o M w/ PMHx of NSCLC Ca Stage 3 on Chemo (last session 5/31), HTN, HLD presenting to ED for SOB and Cough.    #Post obstructive pneumonia?  #Septic on Admission: T: 102.3F, HR: 113  #CP likely 2/2 Cough  - CXR - RUL opacity perihilar, more pulm vasc congestion on 6/6  - MRSA (-)  - c/w Cefepime for now  - procal- 1.22  Augmentin 875mg po q12 x 7 days as outpt    #TALIA -  - Cr up from 1.3 to 1.9, check bmp in am  - 2/2 glucosuria likely,   - c/w IVF 100cc/hr NS x 1L then d/c       #H/O HFpEF/CAD  - BNP on 2185, prior from 4/21 616  - Does not appear congested on PE  - TTE from 4/21: EF 64%, G1DD, Mod AS  - ECHO in this adm - EF 68%  - dcd lasix  - Trops 0.11-->0.06. CKMB 2.8. Currently no CP symptoms. Lactate 1.8  - EKG sinus rhythm, tachy, no ischemic changes      #Hyperglycemia/DM2- 2/2 steroids    #Type 2 DM - uncontrolled temporarely 2/2 Steroids  - A1C 4/21 7.8  - cont current insulin regimen but watch for hypoglycemia as OFF STEROIDS now  - Monitor FS, Keep 140-180, Adjust as needed    #Normocytic Anemia   - Hg 8.8 on admission, was 13 on 4/21  - Likely  2/2 Chemo       #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 Avila (Hem/Onc) and Dr. Frey (Rad/Onc)    #  AS/CAD   - clinically stabe, no acute issues at this time                 - f/u Hem/Onc OP    PLAN - start ALB/ATROVENT nebs q8 hrs standing for 24 hrs please (NOT PRN)         d/c planing, likely tomorrow

## 2022-06-10 NOTE — DISCHARGE NOTE PROVIDER - NSDCMRMEDTOKEN_GEN_ALL_CORE_FT
Aspir 81 oral delayed release tablet: 1 tab(s) orally once a day   atorvastatin 40 mg oral tablet: 1 tab(s) orally once a day  dronabinol 2.5 mg oral capsule: 1 cap(s) orally 2 times a day  folic acid 1 mg oral tablet: 1 tab(s) orally once a day  lisinopril 5 mg oral tablet: 1 tab(s) orally once a day  metFORMIN 500 mg oral tablet: 1 tab(s) orally 2 times a day  METOPROLOL SUCCINATE ER  25 MG TB24: 1 tab(s) orally once a day  prochlorperazine 10 mg oral tablet: 1 tab(s) orally every 6 hours  Zofran 8 mg oral tablet: 1 tab(s) orally 3 times a day   amoxicillin-clavulanate 875 mg-125 mg oral tablet: 1 tab(s) orally 2 times a day (end 6/17)  Aspir 81 oral delayed release tablet: 1 tab(s) orally once a day   atorvastatin 40 mg oral tablet: 1 tab(s) orally once a day  dronabinol 2.5 mg oral capsule: 1 cap(s) orally 2 times a day  folic acid 1 mg oral tablet: 1 tab(s) orally once a day  lisinopril 5 mg oral tablet: 1 tab(s) orally once a day  metFORMIN 500 mg oral tablet: 1 tab(s) orally 2 times a day  METOPROLOL SUCCINATE ER  25 MG TB24: 1 tab(s) orally once a day  prochlorperazine 10 mg oral tablet: 1 tab(s) orally every 6 hours  Zofran 8 mg oral tablet: 1 tab(s) orally 3 times a day

## 2022-06-10 NOTE — DISCHARGE NOTE PROVIDER - NSDCCPCAREPLAN_GEN_ALL_CORE_FT
PRINCIPAL DISCHARGE DIAGNOSIS  Diagnosis: Neutropenic fever  Assessment and Plan of Treatment: PLEASE TAKE AUGMENTIN FOR 7 MORE DAYS. FOLLOW UP WITH DR. CROSS IN 1 WEEK.  Please take your medications as directed. Don’t skip doses. Follow up with your primary care physician within 3 days. Continue taking your antibiotics as directed until they are all gone—even if you start to feel better. This will prevent the pneumonia from  Coughing up mucus is normal. Don’t use medicines to suppress your cough unless your cough is dry, painful, or interferes with your sleep. Get plenty of rest until your fever, shortness of breath, and chest pain go away. Plan to get a flu shot every year. Ask your primary care doctor about pneumonia vaccines.  Seek immediate medical attention if you experience chest pain, trouble breathing, blue lips or fingernails, fever of 100.4°F  (38°C) or higher, yellow, green, bloody, or smelly sputum, more than normal mucus production, vomiting or diarrhea.        SECONDARY DISCHARGE DIAGNOSES  Diagnosis: Pneumonia  Assessment and Plan of Treatment:

## 2022-06-10 NOTE — PROGRESS NOTE ADULT - SUBJECTIVE AND OBJECTIVE BOX
NAHED CASTANEDA 76y Male  MRN#: [id number]   CODE STATUS: full    Hospital Day: 5d    Pt is currently admitted with the primary diagnosis of pneumonia    SUBJECTIVE  Hospital Course    Overnight events. No acute events overnight. Sugars better controlled with adjustments on insulin. Patient has wheezing. Otherwise, no other complaints.     Subjective complaints                                               ----------------------------------------------------------  OBJECTIVE  PAST MEDICAL & SURGICAL HISTORY  HTN (hypertension)    HLD (hyperlipidemia)    No significant past surgical history                                              -----------------------------------------------------------  ALLERGIES:  No Known Allergies                                            ------------------------------------------------------------    HOME MEDICATIONS  Home Medications:  atorvastatin 40 mg oral tablet: 1 tab(s) orally once a day (28 Apr 2021 23:38)  dexamethasone 4 mg oral tablet: 1 tab(s) orally 2 times a day (06 Jun 2022 02:49)  dronabinol 2.5 mg oral capsule: 1 cap(s) orally 2 times a day (06 Jun 2022 02:49)  folic acid 1 mg oral tablet: 1 tab(s) orally once a day (06 Jun 2022 02:48)  lisinopril 5 mg oral tablet: 1 tab(s) orally once a day (28 Apr 2021 23:38)  metFORMIN 500 mg oral tablet: 1 tab(s) orally 2 times a day (28 Apr 2021 23:38)  METOPROLOL SUCCINATE ER  25 MG TB24: 1 tab(s) orally once a day (28 Apr 2021 23:38)  prochlorperazine 10 mg oral tablet: 1 tab(s) orally every 6 hours (06 Jun 2022 02:48)  Zofran 8 mg oral tablet: 1 tab(s) orally 3 times a day (06 Jun 2022 02:49)                           MEDICATIONS:  STANDING MEDICATIONS  albuterol/ipratropium for Nebulization 3 milliLiter(s) Nebulizer every 8 hours  aspirin enteric coated 81 milliGRAM(s) Oral daily  atorvastatin Oral Tab/Cap - Peds 40 milliGRAM(s) Oral daily  benzonatate 100 milliGRAM(s) Oral three times a day  cefepime   IVPB 2000 milliGRAM(s) IV Intermittent every 12 hours  dextrose 5%. 1000 milliLiter(s) IV Continuous <Continuous>  dextrose 5%. 1000 milliLiter(s) IV Continuous <Continuous>  dextrose 50% Injectable 25 Gram(s) IV Push once  dextrose 50% Injectable 12.5 Gram(s) IV Push once  dextrose 50% Injectable 25 Gram(s) IV Push once  enoxaparin Injectable 40 milliGRAM(s) SubCutaneous every 24 hours  folic acid 1 milliGRAM(s) Oral daily  glucagon  Injectable 1 milliGRAM(s) IntraMuscular once  guaifenesin/dextromethorphan Oral Liquid 10 milliLiter(s) Oral every 6 hours  insulin glargine Injectable (LANTUS) 40 Unit(s) SubCutaneous once  insulin lispro (ADMELOG) corrective regimen sliding scale   SubCutaneous three times a day before meals  insulin lispro Injectable (ADMELOG) 10 Unit(s) SubCutaneous three times a day before meals  lactated ringers. 1000 milliLiter(s) IV Continuous <Continuous>  lisinopril 5 milliGRAM(s) Oral daily  metoprolol succinate ER 25 milliGRAM(s) Oral daily  polyethylene glycol 3350 17 Gram(s) Oral daily    PRN MEDICATIONS  dextrose Oral Gel 15 Gram(s) Oral once PRN                                            ------------------------------------------------------------  VITAL SIGNS: Last 24 Hours  T(C): 37.1 (10 Steven 2022 05:06), Max: 37.1 (10 Steven 2022 05:06)  T(F): 98.8 (10 Steven 2022 05:06), Max: 98.8 (10 Steven 2022 05:06)  HR: 84 (10 Steven 2022 08:51) (80 - 85)  BP: 125/59 (10 Steven 2022 05:06) (115/59 - 125/59)  BP(mean): --  RR: 18 (10 Steven 2022 05:06) (18 - 18)  SpO2: 93% (10 Steven 2022 08:51) (93% - 93%)      06-09-22 @ 07:01  -  06-10-22 @ 07:00  --------------------------------------------------------  IN: 0 mL / OUT: 410 mL / NET: -410 mL    06-10-22 @ 07:01  -  06-10-22 @ 11:39  --------------------------------------------------------  IN: 0 mL / OUT: 800 mL / NET: -800 mL                                             --------------------------------------------------------------  LABS:                        8.7    12.99 )-----------( 212      ( 10 Steven 2022 08:40 )             25.6     06-10    136  |  99  |  48<H>  ----------------------------<  199<H>  5.2<H>   |  23  |  1.3    Ca    8.7      10 Steven 2022 08:40  Mg     1.8     06-10    TPro  5.2<L>  /  Alb  2.9<L>  /  TBili  0.3  /  DBili  x   /  AST  32  /  ALT  51<H>  /  AlkPhos  98  06-10                Culture - Sputum (collected 09 Jun 2022 19:33)  Source: .Sputum Sputum  Gram Stain (10 Steven 2022 06:40):    Rare polymorphonuclear leukocytes per low power field    No Squamous epithelial cells per low power field    Few Yeast like cells seen per oil power field    Few Gram Positive Rods seen per oil power field                                                    -------------------------------------------------------------  RADIOLOGY:                                            --------------------------------------------------------------    PHYSICAL EXAM:  General: Well appearing, not in acute distress  HEENT: No cervical LAD, or JVD  LUNGS: wheezing b/l, on RA  HEART: RRR, no murmur  ABDOMEN: NBS, soft, nontender to palpation  EXT: no peripheral pitting edema b/l  NEURO: AAOx3  SKIN: No skin rash, open wounds                                           --------------------------------------------------------------

## 2022-06-10 NOTE — PROGRESS NOTE ADULT - ASSESSMENT
ASSESSMENT  77 y/o M w/ PMHx of NSCLC Ca Stage 3 on Chemo (last session 5/31), HTN, HLD presenting to ED for SOB and Cough. Patient states that for the last several days ago w/ persistent productive cough of whitish sputum, 1 table spoon amount, lasts 15 min, recurs every 5-10 min no blood noted in cough. Cough a/w CP, non-radiating, only present whilst coughing, not present at rest, or related to exertion or respiration.     IMPRESSION  #Suspected post-obstructive rule out GNR PNA in the setting of lung ca on chemo  Sepsis on admission T>101 P>90 WBC 3-->15  Procalcitonin, Serum: 1.22 (06-07-22 @ 12:19)  MRSA PCR Result.: Negative (06-07-22 @ 06:50)  Legionella Antigen, Urine: Negative (06-07-22 @ 06:49)  Streptococcus pneumoniae Ag, Ur Result: Negative (06-07-22 @ 06:49)  Rapid RVP Result: NotDetec (06-05-22 @ 16:48)    #Hyponatremia    Creatinine, Serum: 1.8 mg/dL (06.09.22 @ 07:30)        RECOMMENDATIONS  - sputum no PMN  - augmentin 875mg BID x 7 days

## 2022-06-10 NOTE — PHYSICAL THERAPY INITIAL EVALUATION ADULT - GENERAL OBSERVATIONS, REHAB EVAL
PT  [id number]. Chart reviewed and case discussed with GASPER Rangel. Pt encountered semi-reclined in bed. In NAD. RN disconnected IV for mobility

## 2022-06-10 NOTE — PHYSICAL THERAPY INITIAL EVALUATION ADULT - PERTINENT HX OF CURRENT PROBLEM, REHAB EVAL
75 y/o M w/ PMHx of NSCLC Ca Stage 3 on Chemo (last session 5/31), HTN, HLD presenting to ED for SOB and Cough.

## 2022-06-10 NOTE — PROGRESS NOTE ADULT - SUBJECTIVE AND OBJECTIVE BOX
TONYA, NAHED  76y, Male  Allergy: No Known Allergies      LOS  5d    CHIEF COMPLAINT: SOB (09 Jun 2022 15:09)      INTERVAL EVENTS/HPI  - No acute events overnight  - T(F): , Max: 98.8 (06-10-22 @ 05:06)  - Denies any worsening symptoms  - Tolerating medication  - WBC Count: 12.99 (06-10-22 @ 08:40)  WBC Count: 16.91 (06-09-22 @ 07:30)     - Creatinine, Serum: 1.3 (06-10-22 @ 08:40)  Creatinine, Serum: 1.8 (06-09-22 @ 07:30)       ROS  General: Denies rigors, nightsweats  HEENT: Denies headache, rhinorrhea, sore throat, eye pain  CV: Denies CP, palpitations  PULM: Denies wheezing, hemoptysis  GI: Denies hematemesis, hematochezia, melena  : Denies discharge, hematuria  MSK: Denies arthralgias, myalgias  SKIN: Denies rash, lesions  NEURO: Denies paresthesias, weakness  PSYCH: Denies depression, anxiety    VITALS:  T(F): 98.8, Max: 98.8 (06-10-22 @ 05:06)  HR: 88  BP: 137/68  RR: 18Vital Signs Last 24 Hrs  T(C): 37.1 (10 Steven 2022 13:24), Max: 37.1 (10 Steven 2022 05:06)  T(F): 98.8 (10 Steven 2022 13:24), Max: 98.8 (10 Steven 2022 05:06)  HR: 88 (10 Steven 2022 13:24) (81 - 88)  BP: 137/68 (10 Steven 2022 13:24) (115/59 - 137/68)  BP(mean): --  RR: 18 (10 Steven 2022 13:24) (18 - 18)  SpO2: 93% (10 Steven 2022 08:51) (93% - 93%)    PHYSICAL EXAM:  Gen: NAD, resting in bed  HEENT: Normocephalic, atraumatic  Neck: supple, no lymphadenopathy  CV: Regular rate & regular rhythm  Lungs: decreased BS at bases, no fremitus  Abdomen: Soft, BS present  Ext: Warm, well perfused  Neuro: non focal, awake  Skin: no rash, no erythema  Lines: no phlebitis    FH: Non-contributory  Social Hx: Non-contributory    TESTS & MEASUREMENTS:                        8.7    12.99 )-----------( 212      ( 10 Steven 2022 08:40 )             25.6     06-10    136  |  99  |  48<H>  ----------------------------<  199<H>  5.2<H>   |  23  |  1.3    Ca    8.7      10 Steven 2022 08:40  Mg     1.8     06-10    TPro  5.2<L>  /  Alb  2.9<L>  /  TBili  0.3  /  DBili  x   /  AST  32  /  ALT  51<H>  /  AlkPhos  98  06-10      LIVER FUNCTIONS - ( 10 Steven 2022 08:40 )  Alb: 2.9 g/dL / Pro: 5.2 g/dL / ALK PHOS: 98 U/L / ALT: 51 U/L / AST: 32 U/L / GGT: x               Culture - Sputum (collected 06-09-22 @ 19:33)  Source: .Sputum Sputum  Gram Stain (06-10-22 @ 06:40):    Rare polymorphonuclear leukocytes per low power field    No Squamous epithelial cells per low power field    Few Yeast like cells seen per oil power field    Few Gram Positive Rods seen per oil power field    Culture - Blood (collected 06-06-22 @ 09:00)  Source: .Blood None  Preliminary Report (06-07-22 @ 19:02):    No growth to date.    Culture - Urine (collected 06-05-22 @ 20:57)  Source: Clean Catch Clean Catch (Midstream)  Final Report (06-07-22 @ 00:50):    No growth    Culture - Blood (collected 06-05-22 @ 16:40)  Source: .Blood Blood  Preliminary Report (06-07-22 @ 03:01):    No growth to date.    Culture - Blood (collected 06-05-22 @ 16:40)  Source: .Blood Blood  Preliminary Report (06-07-22 @ 03:01):    No growth to date.        Blood Gas Venous - Lactate: 1.80 mmol/L (06-05-22 @ 17:56)      INFECTIOUS DISEASES TESTING  Procalcitonin, Serum: 1.22 (06-07-22 @ 12:19)  MRSA PCR Result.: Negative (06-07-22 @ 06:50)  Legionella Antigen, Urine: Negative (06-07-22 @ 06:49)  Streptococcus pneumoniae Ag, Ur Result: Negative (06-07-22 @ 06:49)  Rapid RVP Result: NotDetec (06-05-22 @ 16:48)      INFLAMMATORY MARKERS  C-Reactive Protein, Serum: 153 mg/L (06-07-22 @ 12:19)      RADIOLOGY & ADDITIONAL TESTS:  I have personally reviewed the last available Chest xray  CXR      CT      CARDIOLOGY TESTING  12 Lead ECG:   Ventricular Rate 114 BPM    Atrial Rate 113 BPM    QRS Duration 100 ms    Q-T Interval 340 ms    QTC Calculation(Bazett) 468 ms    R Axis -60 degrees    T Axis 87 degrees    Diagnosis Line Accelerated Junctional rhythm  Left anterior fascicular block  Abnormal ECG    Confirmed by JIM MALCOLM MD ([id number]) on 6/6/2022 8:21:48 AM (06-06-22 @ 02:22)  12 Lead ECG:   Ventricular Rate 162 BPM    Atrial Rate 163 BPM    QRS Duration 92 ms    Q-T Interval 272 ms    QTC Calculation(Bazett) 446 ms    R Axis -64 degrees    T Axis 83 degrees    Diagnosis Line Supraventricular tachycardia with Premature ventricular complexes or Fusion  complexes  Left axis deviation  Septal infarct , age undetermined  Abnormal ECG    Confirmed by JIM MALCOLM MD ([id number]) on 6/6/2022 8:21:48 AM (06-06-22 @ 01:07)      MEDICATIONS  albuterol/ipratropium for Nebulization 3 Nebulizer every 8 hours  aspirin enteric coated 81 Oral daily  atorvastatin Oral Tab/Cap - Peds 40 Oral daily  benzonatate 100 Oral three times a day  cefepime   IVPB 2000 IV Intermittent every 12 hours  dextrose 5%. 1000 IV Continuous <Continuous>  dextrose 5%. 1000 IV Continuous <Continuous>  dextrose 50% Injectable 25 IV Push once  dextrose 50% Injectable 12.5 IV Push once  dextrose 50% Injectable 25 IV Push once  enoxaparin Injectable 40 SubCutaneous every 24 hours  folic acid 1 Oral daily  glucagon  Injectable 1 IntraMuscular once  guaifenesin/dextromethorphan Oral Liquid 10 Oral every 6 hours  insulin lispro (ADMELOG) corrective regimen sliding scale  SubCutaneous three times a day before meals  insulin lispro Injectable (ADMELOG) 10 SubCutaneous three times a day before meals  lisinopril 5 Oral daily  metoprolol succinate ER 25 Oral daily  polyethylene glycol 3350 17 Oral daily      WEIGHT  Weight (kg): 91.4 (06-06-22 @ 22:50)  Creatinine, Serum: 1.3 mg/dL (06-10-22 @ 08:40)      ANTIBIOTICS:  cefepime   IVPB 2000 milliGRAM(s) IV Intermittent every 12 hours      All available historical records have been reviewed

## 2022-06-10 NOTE — PROGRESS NOTE ADULT - ASSESSMENT
ASSESSMENT & PLAN    75 y/o M w/ PMHx of NSCLC Ca Stage 3 on Chemo (last session 5/31), HTN, HLD presenting to ED for SOB and Cough.    #Post obstructive pneumonia?  #Septic on Admission: T: 102.3F, HR: 113  #CP likely 2/2 Coughing  - CXR - RUL opacity perihilar, more pulm vasc congestion on 6/6  - MRSA (-)  - c/w Cefepime for now  - procal- 1.22  - ID recs noted. CT chest non-con, f/u sputum cx. cont cefepime. on D/C, augmentin x 7 days  - Blood Cx NGTD, UCx NGTD, Legionella (-)  - clinically doing much better.  - d/w resident throughout the day plan of care  would not change abx at this point     #TALIA - prerenal FeNa 0.7- resolved  - Cr up from 1.3 to 1.9  - 2/2 glucosuria  - d/c fluids      #H/O HFpEF/CAD  - BNP on 2185, prior from 4/21 616  - Does not appear congested on PE  - TTE from 4/21: EF 64%, G1DD, Mod AS  - ECHO in this adm - EF 68%  - dcd lasix  - Trops 0.11-->0.06. CKMB 2.8. Currently no CP symptoms. Lactate 1.8  - EKG sinus rhythm, tachy, no ischemic changes      #Hyperglycemia/DM2- 2/2 steroids  #Type 2 DM   - A1C 4/21 7.8  - Blood Glucose on admission 303 on [facility]  - Home Meds: Metformin 500 mg BID  - 6/7- serum glucose in 600s s/p regular insulin 20U and lispro 8U. Was started on Lantus 30U lispro 5/5/5U + SS  - For today, switch lantus to 40U AM, decrease meal time lispro to 10U TID, cont SSI. Tomorrow, will d/c insulin if glucose better controlled. Currently off steroids.  - A1c- 8  - AOx3  - Monitor FS, Keep 140-180, Adjust as needed    #Normocytic Anemia   - Hg 8.8 on admission, was 13 on 4/21  - Possibly 2/2 Chemo   - f/u Iron Studies, B12, Folate, TSH  - Trend CBC, Keep > 7, Transfuse as needed  - c/w Folic Acid 1 mg qD    #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 Avila (Hem/Onc) and Dr. Frey (Rad/Onc)    #  AS/CAD   - clinically stabe, no acute issues at this time                 - f/u Hem/Onc OP    #Hypertension  #Hyperlipidemia   - Normotensive in ED  - c/w Home Meds: ASA 81 mg qD, Atorvastatin 40 mg qD  - c/w Lisinopril 10 mg qD    Diet: DASH, CC  DVT ppx: Lovenox  GI ppx: Protonix  Dispo: glucose control, CT chest   ASSESSMENT & PLAN    75 y/o M w/ PMHx of NSCLC Ca Stage 3 on Chemo (last session 5/31), HTN, HLD presenting to ED for SOB and Cough.    #Post obstructive pneumonia?  #Septic on Admission: T: 102.3F, HR: 113  #CP likely 2/2 Coughing  - CXR - RUL opacity perihilar, more pulm vasc congestion on 6/6  - MRSA (-)  - procal- 1.22  - ID recs noted. CT chest non-con, f/u sputum cx. cont cefepime. on D/C, augmentin x 7 days  - Blood Cx NGTD, UCx NGTD, Legionella (-)  - clinically doing much better  - duoneb q8H ATC for 24 hours for wheezing    #TALIA - prerenal FeNa 0.7- resolved  - Cr up from 1.3 to 1.9  - 2/2 glucosuria  - d/c fluids      #H/O HFpEF/CAD  - BNP on 2185, prior from 4/21 616  - Does not appear congested on PE  - TTE from 4/21: EF 64%, G1DD, Mod AS  - ECHO in this adm - EF 68%  - dcd lasix  - Trops 0.11-->0.06. CKMB 2.8. Currently no CP symptoms. Lactate 1.8  - EKG sinus rhythm, tachy, no ischemic changes      #Hyperglycemia/DM2- 2/2 steroids  #Type 2 DM   - A1C 4/21 7.8  - Blood Glucose on admission 303 on [facility]  - Home Meds: Metformin 500 mg BID  - 6/7- serum glucose in 600s s/p regular insulin 20U and lispro 8U. Was started on Lantus 30U lispro 5/5/5U + SS  - For today, switch lantus to 40U AM, decrease meal time lispro to 10U TID, cont SSI. Tomorrow, will d/c insulin if glucose better controlled. Currently off steroids.  - A1c- 8  - AOx3  - Monitor FS, Keep 140-180, Adjust as needed    #Normocytic Anemia   - Hg 8.8 on admission, was 13 on 4/21  - Possibly 2/2 Chemo   - f/u Iron Studies, B12, Folate, TSH  - Trend CBC, Keep > 7, Transfuse as needed  - c/w Folic Acid 1 mg qD    #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 Avila (Hem/Onc) and Dr. Frey (Rad/Onc)    #  AS/CAD   - clinically stabe, no acute issues at this time                 - f/u Hem/Onc OP    #Hypertension  #Hyperlipidemia   - Normotensive in ED  - c/w Home Meds: ASA 81 mg qD, Atorvastatin 40 mg qD  - c/w Lisinopril 10 mg qD    Diet: DASH, CC  DVT ppx: Lovenox  GI ppx: Protonix  Dispo: glucose control, CT chest

## 2022-06-10 NOTE — DISCHARGE NOTE PROVIDER - CARE PROVIDER_API CALL
Ayush Collins (MD)  Medicine  [address] [place] [address]  Phone: [phone]  Fax: [phone]  Follow Up Time: 1 week

## 2022-06-10 NOTE — DISCHARGE NOTE PROVIDER - NSDCFUSCHEDAPPT_GEN_ALL_CORE_FT
[facility] Physician [facility]  [address]  Scheduled Appointment: 06/16/2022    [facility]  Chemo [facility] [address]   Scheduled Appointment: 06/17/2022

## 2022-06-10 NOTE — DISCHARGE NOTE PROVIDER - HOSPITAL COURSE
75 y/o M w/ PMHx of NSCLC Ca Stage 3 on Chemo (last session 5/31), HTN, HLD presenting to ED for SOB and Cough. In ED, VS sig for TMax 102.3, , Placed on 3L NC. Labs sig 8.8, Na 131, bG 303, Mg 1.7. BNP 2186, VBG wnl. CXR appears to show bilateral opacities. Given dose of Cefepime and Azithromycin. Admitted to Medicine. During his hospital course, patient was on telemetry floor. No arrhythmias, patient was primarily admitted for pneumonia. Downgraded to general medicine floor. Procal was 1.22. Started on cefepime. Legionella (-). Sputum Cx not remarkable. Blood Cx and Urine Cx NGTD. Patient had multiple hyperglycvemic episodes > 500 due to steroid use. Steroid was discontinued. Hyperglycemia resolved. Patient also developed TALIA (Cr 1.3 --> 1.9). Started on fluids and TALIA resolved. Patient will be discharged with augmentin 875mg PO 12 x 7 days as outpatient. Patient will need to follow up regarding CT imaging.

## 2022-06-10 NOTE — PHYSICAL THERAPY INITIAL EVALUATION ADULT - PHYSICAL ASSIST/NONPHYSICAL ASSIST: SUPINE/SIT, REHAB EVAL
verbal cues/1 person assist Banner Transposition Flap Text: The defect edges were debeveled with a #15 scalpel blade.  Given the location of the defect and the proximity to free margins a Banner transposition flap was deemed most appropriate.  Using a sterile surgical marker, an appropriate flap drawn around the defect. The area thus outlined was incised deep to adipose tissue with a #15 scalpel blade.  The skin margins were undermined to an appropriate distance in all directions utilizing iris scissors.

## 2022-06-11 NOTE — CHART NOTE - NSCHARTNOTEFT_GEN_A_CORE
<<<RESIDENT DISCHARGE NOTE>>>     NAHED CASTANEDA  MRN-[id number]    d/c today after premeal lunch insulin and lunch.   No insulin on d/c  augmentin 875mg bid x 7 days    VITAL SIGNS:  T(F): 98.7 (06-11-22 @ 05:06), Max: 98.8 (06-10-22 @ 13:24)  HR: 62 (06-11-22 @ 05:06)  BP: 106/60 (06-11-22 @ 05:06)  SpO2: --      PHYSICAL EXAMINATION:  General: NAD  Head & Neck: NCAT  Pulmonary: CTA b/l   Cardiovascular: +S1/S2, RRR, no M/R/G  Gastrointestinal/Abdomen & Pelvis: +BS, NT/ND  Neurologic/Motor: AAOx3, no focal deficits     TEST RESULTS:                        8.8    12.33 )-----------( 215      ( 11 Jun 2022 08:16 )             26.1       06-11    137  |  101  |  47<H>  ----------------------------<  166<H>  5.4<H>   |  21  |  1.5    Ca    8.8      11 Jun 2022 08:16  Mg     1.9     06-11    TPro  5.7<L>  /  Alb  3.0<L>  /  TBili  0.5  /  DBili  x   /  AST  35  /  ALT  56<H>  /  AlkPhos  109  06-11          DISCHARGE MEDICATION RECONCILIATION REVIEWED WITH   Dr. Collins    DISPOSITION:   [  x] Home,    [  ] Home with Visiting Nursing Services,   [    ]  SNF/ NH,    [   ] Acute Rehab (4A),   [   ] Other (Specify:_________)

## 2022-06-27 NOTE — ASSESSMENT
[FreeTextEntry1] : 76 year old male with NSCLC stage 3 PDL1 :0 , no actionable alterations, s/p chemoradiation and  adjuvant immunotherapy . \par Low back pain . radiculopathy ?\par \par Extensive recurrence involving skeleton , liver and abdominopelvic lymph nodes , minimally symptomatic . \par S/P Alimta X2.\par Atypical PNA , still on PO antibiotics.\par \par Plan  :\par HOLD Alimta # 3 on 6/16/22 \par Patient  reschedule for 6/22/22 if blood work stable , and if patient fully recovered from PNA then we will proceed with Alimta with  20 % dose reduction..\par CBC reviewed: HGB is trending down , most from previous chemo, we will reassess HGB next week. If still trending down we will proceed with blood transfusion.  \par Reinforced Decadron prior the following dose. \par CT Scan from 6/1/22:\par No focal consolidation to suggest pneumonia.\par Right lung linear and irregular opacities without significant change appears to represent posttreatment change\par \par New pericardial effusion measuring 1.6 cm, compared to CT chest 2/25/2022\par \par New 2 mm nodule within the left lower lobe\par \par New trace left pleural effusion.\par \par 2.2 cm right paratracheal lymph node increased in size. Increased size \par hepatic low-attenuation mass. Increased size gastrohepatic lymph node.\par  Patient seen and examined wit Dr. Sutherland ( covering for Dr. Avila). \par  \par \par \par \par \par \par

## 2022-06-27 NOTE — HISTORY OF PRESENT ILLNESS
[de-identified] : \par Mr. Vasquez is a 76 y/o M, current smoker, PMH of DM, HTN, HLD and AS, + smoker presenting for initial evaluation today for newly diagnosed NSCLC, adenocarcinoma. He presents with his son Primo.\par \par His history dates back to late April 2021 when he presented to the ED for shortness of breath after having a positive stress test as an outpatient. He reported that he could walk less than 1 block before becoming SOB which has progressively worsened. He denies any palpitations or chest pain/dizziness/syncope. He underwent cardiac catheterization and was found to have multivessel disease. He was seen by CT surgery while inpatient for AVR/CABG. Preop CT Chest showed a lobulated solid 2.7 cm RUL nodule, and a 5 mm ANIA nodule, and a 4.0 x 2.9 cm right paratracheal soft tissue lesion. S/P Bronch/EBUS 4/30/2021- Prelim Bronch/EBUS path showed carcinoma. Brain MRI negative for metastatic disease. He was discharged with outpatient follow up with oncology and cardiology. \par \par He was subsequently discharged and underwent a PET/CT as outpatient which showed FDG avid right upper lobe nodule, SUV max 23.7 compatible with biologic tumor activity. Ipsilateral FDG avid lymphadenopathy up to 17.0 compatible with hetal metastases.\par \par Final pathology shows Non-small cell carcinoma, favor adenocarcinoma. Immunohistochemical stains are performed and show the tumor is\par positive for CK7, negative for CK20, TTF-1, napsin, CDX2, PAX8; findings are compatible with primary lung adenocarcinoma\par \par  [de-identified] : terval History: 5/19/2021: Today, he feels well. He denies any chest pain, coughing, wheezing, or abdominal pain. He is still actively smoking. He finds it very difficult to stop. He smokes about 1 PPD for at least 60 years. \par 6/24/21:\par  Mr. CASTANEDA came for a follow up visit for NSCLC, lung adenocarcinoma. He completed 15 RT treatment and he is due for the 3rd cycle of Chemo. He reports feels well. He denies any chest pain, coughing, wheezing, abdominal pain. dysphagia. Patient tolerating diet well. \par CBC , CMP reviewed with acceptable results. \par  He is still actively smoking. He finds it very difficult to stop. He smokes about 1 PPD for at least 60 years.\par 7/15/21;\par  Mr. CASTANEDA came for a follow up visit for NSCLC, lung adenocarcinoma. He completed 22 RT treatment and he is due for the 6th cycle of Carboplatin and Taxol. He reports feels well. But he reports SOB on exertion and occasionally coughing. He is still actively smoking about 15 cigarette per day. \par  He denies any chest pain, abdominal pain. dysphagia. Patient tolerating diet well. \par CBC , CMP reviewed with acceptable results. Pulse Ox is 98% on RA.\par  He finds it very difficult to stop. He smokes about 1 PPD for at least 60 years.\par We explain to monitor for Sever coughing or CP. he needs to report to ER in case if any worsen symptoms. \par \par 08/12/2021 patient returns after completion of chemoradiation for stage 3 NSCLC , he complains of weakness, cough , decreased appetite and abnormal taste . he has dyspnea on mild exertion . he is receiving IV magnesium for low mg . He lost few lbs so far. Hgb is down to 9.7 from 12 in one month , no hematochezia. \par \par 08/26/2021 Patient returns for follow up with more weight loss . His breathing is stable , no cough . no fever . \par \par 1/6/2022 Patient returns for follow p , he feels well , he is gaining weight , he denies change in cough or breathing . As per his son , he was never given prescription for PET scan . \par \par 2/10/2022 Patient returns for second dose of durvalumab , he complains of LLQ pain , constipation , also lower back pain radiating to the LLE with activity . no hematochezia , weakness. Still awaiting Ct scan ( PET was denied ) \par \par 3/10/2022 Patient returns for follow p , CT scan shows a new ill defined hepatic lesion suspicious for metastasis . lung mass and adenopathy has diminished . \par He feels well and denies abdominal pain , change in breathing or cough . \par \par 4/26/2022 Patient returns for follow up , PET scan shows extensive recurrence involving abdominopelvic LNs , bone ( right iliac wing , T2 , C4 without CT correlate ) , liver lesion measuring 5.8 X 5.2 cm . \par Marked decrease in RUL nodule , hilar and paratracheal adenopathy s/p radiation .\par He remains totally asymptomatic and denies any pain, weight loss, change in cough or breathing . \par \par 5/26/2022 Patient returns after Alimta X1 , he developed rash , stomatitis for several days . As per his son , he did not take decadron as recommended. He denies weight loss or diarrhea. \par \par  6/16/22:\par Patient  came for  a follow up visit for  NSCLC stage 3, PDL1, S/p  of Alimta X 2,  He developed cough , weakness and he was admitted to the hospital for atypical PNA.\par He was discharged from the hospital on PO antibiotics, No Fever, CP or SOB.\par SOB on long walk or exertions. \par CT Scan repeat From 6/1/22 from hospital admission.  \par

## 2022-06-27 NOTE — REVIEW OF SYSTEMS
[Wheezing] : wheezing [SOB on Exertion] : shortness of breath during exertion [Muscle Weakness] : muscle weakness [Negative] : Integumentary [FreeTextEntry6] : Wheezing

## 2022-06-27 NOTE — PHYSICAL EXAM
[Normal] : no peripheral adenopathy appreciated [Ambulatory and capable of all self care but unable to carry out any work activities] : Status 2- Ambulatory and capable of all self care but unable to carry out any work activities. Up and about more than 50% of waking hours [de-identified] : well preserved  in no acute distress.  [de-identified] : no edema.  [de-identified] : soft , no RUQ tenderness. .  [de-identified] : mild tenderness over lower spine ,

## 2022-07-03 NOTE — ASSESSMENT
[FreeTextEntry1] : 76 year old male with NSCLC stage 3 PDL1 :0 , no actionable alterations, s/p chemoradiation and  adjuvant immunotherapy . \par Low back pain . radiculopathy ?\par \par Extensive recurrence involving skeleton , liver and abdominopelvic lymph nodes , minimally symptomatic . \par S/P Alimta X2.\par Atypical PNA , still on PO antibiotics.\par \par Plan  :\par HOLD Alimta # 3 on 6/16/22 \par CBC reviewed with Improvement in HGB/ HCT level.\par On 6/30/22 Hydrate with Normal saline 500 cc ,\par Proceed with Alimta with 30% reduction\par CT Scan from 6/1/22:\par No focal consolidation to suggest pneumonia.\par Right lung linear and irregular opacities without significant change appears to represent posttreatment change\par \par New pericardial effusion measuring 1.6 cm, compared to CT chest 2/25/2022\par \par New 2 mm nodule within the left lower lobe\par \par New trace left pleural effusion.\par \par 2.2 cm right paratracheal lymph node increased in size. Increased size \par hepatic low-attenuation mass. Increased size gastrohepatic lymph node.\par  Patient seen and examined wit Dr. Avila who agreed for the above plan of care.  \par  \par \par \par \par \par \par

## 2022-07-03 NOTE — PHYSICAL EXAM
[Ambulatory and capable of all self care but unable to carry out any work activities] : Status 2- Ambulatory and capable of all self care but unable to carry out any work activities. Up and about more than 50% of waking hours [Normal] : no peripheral adenopathy appreciated [de-identified] : well preserved  in no acute distress.  [de-identified] : no edema.  [de-identified] : soft , no RUQ tenderness. .  [de-identified] : mild tenderness over lower spine ,

## 2022-07-03 NOTE — REVIEW OF SYSTEMS
[Wheezing] : wheezing [Muscle Weakness] : muscle weakness [Negative] : Integumentary [SOB on Exertion] : shortness of breath during exertion [FreeTextEntry6] : Wheezing

## 2022-07-03 NOTE — HISTORY OF PRESENT ILLNESS
[de-identified] : \par Mr. Vasquez is a 76 y/o M, current smoker, PMH of DM, HTN, HLD and AS, + smoker presenting for initial evaluation today for newly diagnosed NSCLC, adenocarcinoma. He presents with his son Primo.\par \par His history dates back to late April 2021 when he presented to the ED for shortness of breath after having a positive stress test as an outpatient. He reported that he could walk less than 1 block before becoming SOB which has progressively worsened. He denies any palpitations or chest pain/dizziness/syncope. He underwent cardiac catheterization and was found to have multivessel disease. He was seen by CT surgery while inpatient for AVR/CABG. Preop CT Chest showed a lobulated solid 2.7 cm RUL nodule, and a 5 mm ANIA nodule, and a 4.0 x 2.9 cm right paratracheal soft tissue lesion. S/P Bronch/EBUS 4/30/2021- Prelim Bronch/EBUS path showed carcinoma. Brain MRI negative for metastatic disease. He was discharged with outpatient follow up with oncology and cardiology. \par \par He was subsequently discharged and underwent a PET/CT as outpatient which showed FDG avid right upper lobe nodule, SUV max 23.7 compatible with biologic tumor activity. Ipsilateral FDG avid lymphadenopathy up to 17.0 compatible with hetal metastases.\par \par Final pathology shows Non-small cell carcinoma, favor adenocarcinoma. Immunohistochemical stains are performed and show the tumor is\par positive for CK7, negative for CK20, TTF-1, napsin, CDX2, PAX8; findings are compatible with primary lung adenocarcinoma\par \par  [de-identified] : terval History: 5/19/2021: Today, he feels well. He denies any chest pain, coughing, wheezing, or abdominal pain. He is still actively smoking. He finds it very difficult to stop. He smokes about 1 PPD for at least 60 years. \par 6/24/21:\par  Mr. CASTANEDA came for a follow up visit for NSCLC, lung adenocarcinoma. He completed 15 RT treatment and he is due for the 3rd cycle of Chemo. He reports feels well. He denies any chest pain, coughing, wheezing, abdominal pain. dysphagia. Patient tolerating diet well. \par CBC , CMP reviewed with acceptable results. \par  He is still actively smoking. He finds it very difficult to stop. He smokes about 1 PPD for at least 60 years.\par 7/15/21;\par  Mr. CASTANEDA came for a follow up visit for NSCLC, lung adenocarcinoma. He completed 22 RT treatment and he is due for the 6th cycle of Carboplatin and Taxol. He reports feels well. But he reports SOB on exertion and occasionally coughing. He is still actively smoking about 15 cigarette per day. \par  He denies any chest pain, abdominal pain. dysphagia. Patient tolerating diet well. \par CBC , CMP reviewed with acceptable results. Pulse Ox is 98% on RA.\par  He finds it very difficult to stop. He smokes about 1 PPD for at least 60 years.\par We explain to monitor for Sever coughing or CP. he needs to report to ER in case if any worsen symptoms. \par \par 08/12/2021 patient returns after completion of chemoradiation for stage 3 NSCLC , he complains of weakness, cough , decreased appetite and abnormal taste . he has dyspnea on mild exertion . he is receiving IV magnesium for low mg . He lost few lbs so far. Hgb is down to 9.7 from 12 in one month , no hematochezia. \par \par 08/26/2021 Patient returns for follow up with more weight loss . His breathing is stable , no cough . no fever . \par \par 1/6/2022 Patient returns for follow p , he feels well , he is gaining weight , he denies change in cough or breathing . As per his son , he was never given prescription for PET scan . \par \par 2/10/2022 Patient returns for second dose of durvalumab , he complains of LLQ pain , constipation , also lower back pain radiating to the LLE with activity . no hematochezia , weakness. Still awaiting Ct scan ( PET was denied ) \par \par 3/10/2022 Patient returns for follow p , CT scan shows a new ill defined hepatic lesion suspicious for metastasis . lung mass and adenopathy has diminished . \par He feels well and denies abdominal pain , change in breathing or cough . \par \par 4/26/2022 Patient returns for follow up , PET scan shows extensive recurrence involving abdominopelvic LNs , bone ( right iliac wing , T2 , C4 without CT correlate ) , liver lesion measuring 5.8 X 5.2 cm . \par Marked decrease in RUL nodule , hilar and paratracheal adenopathy s/p radiation .\par He remains totally asymptomatic and denies any pain, weight loss, change in cough or breathing . \par \par 5/26/2022 Patient returns after Alimta X1 , he developed rash , stomatitis for several days . As per his son , he did not take decadron as recommended. He denies weight loss or diarrhea. \par \par  6/16/22:\par Patient  came for  a follow up visit for  NSCLC stage 3, PDL1, S/p  of Alimta X 2,  He developed cough , weakness and he was admitted to the hospital for atypical PNA.\par He was discharged from the hospital on PO antibiotics, No Fever, CP or SOB.\par SOB on long walk or exertions. \par CT Scan repeat From 6/1/22 from hospital admission.  \par 7/1/22:\par Patient  came for  a follow up visit for  NSCLC stage 3, PDL1, S/p  of Alimta X 2,  Patient start to feels better now, he still complaining of SOB on exertion, NO Fever, CP.\par   CBC HGB improved , We will proceed with Hydration normal saline 500 cc .\par \par He was discharged from the hospital on PO antibiotics, No Fever, CP or SOB.\par SOB on long walk or exertions.

## 2022-07-21 PROBLEM — C34.10 LUNG CANCER, UPPER LOBE: Status: ACTIVE | Noted: 2021-05-05

## 2022-07-21 PROBLEM — R91.1 LUNG NODULE: Status: ACTIVE | Noted: 2021-05-03

## 2022-07-21 PROBLEM — R59.0 MEDIASTINAL LYMPHADENOPATHY: Status: ACTIVE | Noted: 2021-05-04

## 2022-07-21 PROBLEM — Z51.11 ENCOUNTER FOR ANTINEOPLASTIC CHEMOTHERAPY: Status: ACTIVE | Noted: 2021-06-24

## 2022-07-21 PROBLEM — Z71.6 ENCOUNTER FOR SMOKING CESSATION COUNSELING: Status: ACTIVE | Noted: 2021-06-24

## 2022-07-23 NOTE — ASSESSMENT
[FreeTextEntry1] : 76 year old male with NSCLC stage 3 PDL1 :0 , no actionable alterations, s/p chemoradiation and  adjuvant immunotherapy . \par Low back pain . radiculopathy ?\par \par Extensive recurrence involving skeleton , liver and abdominopelvic lymph nodes , minimally symptomatic . \par S/P Alimta X2.\par Atypical PNA , still on PO antibiotics.\par \par Plan  :\par CBC reviewed with   HGB/ HCT level trending down\par Schedule for Type and cross of one unit of PRBcs on 7/22/22.\par On 7/21/22 Proceed with Alimta with 30% reduction Cycle #4.\par CT Scan from 6/10/22:\par No focal consolidation to suggest pneumonia.\par Right lung linear and irregular opacities without significant change appears to represent posttreatment change\par \par New pericardial effusion measuring 1.6 cm, compared to CT chest 2/25/2022\par \par New 2 mm nodule within the left lower lobe\par \par New trace left pleural effusion.\par 2.2 cm right paratracheal lymph node increased in size. Increased size \par hepatic low-attenuation mass. Increased size gastrohepatic lymph node..\par -Patient is due to repeat CT Scan of C/A/P with Contrast , RX provided on 7/21/22.\par  Patient seen and examined wit Dr. Avila who agreed for the above plan of care.\par  RTC in 3 weeks.   \par  \par \par \par \par \par \par

## 2022-07-23 NOTE — HISTORY OF PRESENT ILLNESS
[de-identified] : \par Mr. Vasquez is a 76 y/o M, current smoker, PMH of DM, HTN, HLD and AS, + smoker presenting for initial evaluation today for newly diagnosed NSCLC, adenocarcinoma. He presents with his son Primo.\par \par His history dates back to late April 2021 when he presented to the ED for shortness of breath after having a positive stress test as an outpatient. He reported that he could walk less than 1 block before becoming SOB which has progressively worsened. He denies any palpitations or chest pain/dizziness/syncope. He underwent cardiac catheterization and was found to have multivessel disease. He was seen by CT surgery while inpatient for AVR/CABG. Preop CT Chest showed a lobulated solid 2.7 cm RUL nodule, and a 5 mm ANIA nodule, and a 4.0 x 2.9 cm right paratracheal soft tissue lesion. S/P Bronch/EBUS 4/30/2021- Prelim Bronch/EBUS path showed carcinoma. Brain MRI negative for metastatic disease. He was discharged with outpatient follow up with oncology and cardiology. \par \par He was subsequently discharged and underwent a PET/CT as outpatient which showed FDG avid right upper lobe nodule, SUV max 23.7 compatible with biologic tumor activity. Ipsilateral FDG avid lymphadenopathy up to 17.0 compatible with hetal metastases.\par \par Final pathology shows Non-small cell carcinoma, favor adenocarcinoma. Immunohistochemical stains are performed and show the tumor is\par positive for CK7, negative for CK20, TTF-1, napsin, CDX2, PAX8; findings are compatible with primary lung adenocarcinoma\par \par  [de-identified] : terval History: 5/19/2021: Today, he feels well. He denies any chest pain, coughing, wheezing, or abdominal pain. He is still actively smoking. He finds it very difficult to stop. He smokes about 1 PPD for at least 60 years. \par 6/24/21:\par  Mr. CASTANEDA came for a follow up visit for NSCLC, lung adenocarcinoma. He completed 15 RT treatment and he is due for the 3rd cycle of Chemo. He reports feels well. He denies any chest pain, coughing, wheezing, abdominal pain. dysphagia. Patient tolerating diet well. \par CBC , CMP reviewed with acceptable results. \par  He is still actively smoking. He finds it very difficult to stop. He smokes about 1 PPD for at least 60 years.\par 7/15/21;\par  Mr. CASTANEDA came for a follow up visit for NSCLC, lung adenocarcinoma. He completed 22 RT treatment and he is due for the 6th cycle of Carboplatin and Taxol. He reports feels well. But he reports SOB on exertion and occasionally coughing. He is still actively smoking about 15 cigarette per day. \par  He denies any chest pain, abdominal pain. dysphagia. Patient tolerating diet well. \par CBC , CMP reviewed with acceptable results. Pulse Ox is 98% on RA.\par  He finds it very difficult to stop. He smokes about 1 PPD for at least 60 years.\par We explain to monitor for Sever coughing or CP. he needs to report to ER in case if any worsen symptoms. \par \par 08/12/2021 patient returns after completion of chemoradiation for stage 3 NSCLC , he complains of weakness, cough , decreased appetite and abnormal taste . he has dyspnea on mild exertion . he is receiving IV magnesium for low mg . He lost few lbs so far. Hgb is down to 9.7 from 12 in one month , no hematochezia. \par \par 08/26/2021 Patient returns for follow up with more weight loss . His breathing is stable , no cough . no fever . \par \par 1/6/2022 Patient returns for follow p , he feels well , he is gaining weight , he denies change in cough or breathing . As per his son , he was never given prescription for PET scan . \par \par 2/10/2022 Patient returns for second dose of durvalumab , he complains of LLQ pain , constipation , also lower back pain radiating to the LLE with activity . no hematochezia , weakness. Still awaiting Ct scan ( PET was denied ) \par \par 3/10/2022 Patient returns for follow p , CT scan shows a new ill defined hepatic lesion suspicious for metastasis . lung mass and adenopathy has diminished . \par He feels well and denies abdominal pain , change in breathing or cough . \par \par 4/26/2022 Patient returns for follow up , PET scan shows extensive recurrence involving abdominopelvic LNs , bone ( right iliac wing , T2 , C4 without CT correlate ) , liver lesion measuring 5.8 X 5.2 cm . \par Marked decrease in RUL nodule , hilar and paratracheal adenopathy s/p radiation .\par He remains totally asymptomatic and denies any pain, weight loss, change in cough or breathing . \par \par 5/26/2022 Patient returns after Alimta X1 , he developed rash , stomatitis for several days . As per his son , he did not take decadron as recommended. He denies weight loss or diarrhea. \par \par  6/16/22:\par Patient  came for  a follow up visit for  NSCLC stage 3, PDL1, S/p  of Alimta X 2,  He developed cough , weakness and he was admitted to the hospital for atypical PNA.\par He was discharged from the hospital on PO antibiotics, No Fever, CP or SOB.\par SOB on long walk or exertions. \par CT Scan repeat From 6/1/22 from hospital admission.  \par 7/1/22:\par Patient  came for  a follow up visit for  NSCLC stage 3, PDL1, S/p  of Alimta X 2,  Patient start to feels better now, he still complaining of SOB on exertion, NO Fever, CP.\par   CBC HGB improved , We will proceed with Hydration normal saline 500 cc .\par \par He was discharged from the hospital on PO antibiotics, No Fever, CP or SOB.\par SOB on long walk or exertions. \par  7/21/22:\par Patient  came for  a follow up visit for  NSCLC stage 3, PDL1, S/p  of Alimta X 3 ,  Patient still complaining of weakness, SOB on exertion, No CP, Feverr.\par He able to sleep well, maintain good hydration and nutrition. No Changes in Bowel movement.   We will proceed with Alimta with 30% reduction.\par CBC reviewed with HGb is trending down to 8.9 , We will schedule for one unit of blood transfusion.\par Also patient is due to repeat CT scan of C/A/P with contrast after this cycle. Prescription provided.

## 2022-07-23 NOTE — PHYSICAL EXAM
[Ambulatory and capable of all self care but unable to carry out any work activities] : Status 2- Ambulatory and capable of all self care but unable to carry out any work activities. Up and about more than 50% of waking hours [Normal] : no peripheral adenopathy appreciated [de-identified] : well preserved  in no acute distress.  [de-identified] : no edema.  [de-identified] : soft , no RUQ tenderness. .  [de-identified] : mild tenderness over lower spine ,

## 2022-08-11 NOTE — ASSESSMENT
[FreeTextEntry1] : 76 year old male with NSCLC stage 3 PDL1 :0 , no actionable alterations. s/p chemoradiation and  adjuvant immunotherapy . \par Low back pain . radiculopathy ?\par \par Extensive recurrence involving skeleton , liver and abdominopelvic lymph nodes .\par Progressive disease on Alimta ,\par weakness, weight loss . S/P COPD exacerbation +/- pneumonia . \par Anemia s/p transfusion , no symptomatic improvement . \par Lower  urinary tract symptoms . \par \par Plan : urine culture\par          Macrodantin X 5 days . \par          COnsider salvage chemo ( Loup v/s weekly taxotere ) \par         patient is  not ready for chemo , palliative care ?

## 2022-08-11 NOTE — HISTORY OF PRESENT ILLNESS
[de-identified] : \par Mr. Vasquez is a 76 y/o M, current smoker, PMH of DM, HTN, HLD and AS, + smoker presenting for initial evaluation today for newly diagnosed NSCLC, adenocarcinoma. He presents with his son Primo.\par \par His history dates back to late April 2021 when he presented to the ED for shortness of breath after having a positive stress test as an outpatient. He reported that he could walk less than 1 block before becoming SOB which has progressively worsened. He denies any palpitations or chest pain/dizziness/syncope. He underwent cardiac catheterization and was found to have multivessel disease. He was seen by CT surgery while inpatient for AVR/CABG. Preop CT Chest showed a lobulated solid 2.7 cm RUL nodule, and a 5 mm ANIA nodule, and a 4.0 x 2.9 cm right paratracheal soft tissue lesion. S/P Bronch/EBUS 4/30/2021- Prelim Bronch/EBUS path showed carcinoma. Brain MRI negative for metastatic disease. He was discharged with outpatient follow up with oncology and cardiology. \par \par He was subsequently discharged and underwent a PET/CT as outpatient which showed FDG avid right upper lobe nodule, SUV max 23.7 compatible with biologic tumor activity. Ipsilateral FDG avid lymphadenopathy up to 17.0 compatible with hetal metastases.\par \par Final pathology shows Non-small cell carcinoma, favor adenocarcinoma. Immunohistochemical stains are performed and show the tumor is\par positive for CK7, negative for CK20, TTF-1, napsin, CDX2, PAX8; findings are compatible with primary lung adenocarcinoma\par \par  [de-identified] : terval History: 5/19/2021: Today, he feels well. He denies any chest pain, coughing, wheezing, or abdominal pain. He is still actively smoking. He finds it very difficult to stop. He smokes about 1 PPD for at least 60 years. \par 6/24/21:\par  Mr. CASTANEDA came for a follow up visit for NSCLC, lung adenocarcinoma. He completed 15 RT treatment and he is due for the 3rd cycle of Chemo. He reports feels well. He denies any chest pain, coughing, wheezing, abdominal pain. dysphagia. Patient tolerating diet well. \par CBC , CMP reviewed with acceptable results. \par  He is still actively smoking. He finds it very difficult to stop. He smokes about 1 PPD for at least 60 years.\par 7/15/21;\par  Mr. CASTANEDA came for a follow up visit for NSCLC, lung adenocarcinoma. He completed 22 RT treatment and he is due for the 6th cycle of Carboplatin and taxol. He reports feels well. But he reports SOB on exertion and occasionally coughing. He is still actively smoking about 15 cigarette per day. \par  He denies any chest pain, abdominal pain. dysphagia. Patient tolerating diet well. \par CBC , CMP reviewed with acceptable results. Pulse Ox is 98% on RA.\par  He finds it very difficult to stop. He smokes about 1 PPD for at least 60 years.\par We explain to monitor for Sever coughing or CP. he needs to report to ER in case if any worsen symptoms. \par \par 08/12/2021 patient returns after completion of chemoradiation for stage 3 NSCLC , he complains of weakness, cough , decreased appetite and abnormal taste . he has dyspnea on mild exertion . he is receiving IV magnesium for low mg . He lost few lbs so far. Hgb is down to 9.7 from 12 in one month , no hematochezia. \par \par 08/26/2021 Patient returns for follow up with more weight loss . His breathing is stable , no cough . no fever . \par \par 1/6/2022 Patient returns for follow p , he feels well , he is gaining weight , he denies change in cough or breathing . As per his son , he was never given prescription for PET scan . \par \par 2/10/2022 Patient returns for second dose of durvalmab , he complains of LLQ pain , constipation , also lower back pain radiating to the LLE with activity . no hematochezia , weakness. Still awaiting Ct scan ( PET was denied ) \par \par 3/10/2022 Patient returns for follow p , CT scan shows a new ill defined hepatic lesion suspicious for metastasis . lung mass and adenopathy has diminished . \par He feels well and denies abdominal pain , change in breathing or cough . \par \par 4/26/2022 Patient returns for follow up , PET scan shows extensive recurrence involving abdominopelvic LNs , bone ( right iliac wing , T2 , C4 without CT correlate ) , liver lesion measuring 5.8 X 5.2 cm . \par Marked decrease in RUL nodule , hilar and paratracheal adenopathy s/p radiation .\par He remains totally asymptomatic and denies any pain, weight loss, change in cough or breathing . \par \par 5/26/2022 Patient returns after Alimta X1 , he developed rash , stomatitis for several days . As per his son , he did not take decadron as recommended. He denies weight loss or diarrhea. \par \par 8/11/2022 Patient returns for follow up with increased weakness, exertional dyspnea , weight loss. , he was treated recently for exacerbated COPD and suspected  pneumonia , CT chest showed new RLL opacity . In addition he complains of urinary frequency and dysuria . \par CT abdomen showed increase in liver metastasis . he denies any pain .

## 2022-08-11 NOTE — PHYSICAL EXAM
[Normal] : no peripheral adenopathy appreciated [de-identified] : well preserved  in no acute distress.  [de-identified] : brian cardenas .  [de-identified] : no edema.  [de-identified] : soft , no RUQ tenderness. .

## 2022-08-28 NOTE — ED ADULT NURSE NOTE - NSIMPLEMENTINTERV_GEN_ALL_ED
Implemented All Fall Risk Interventions:  [place] to call system. Call bell, personal items and telephone within reach. Instruct patient to call for assistance. Room bathroom lighting operational. Non-slip footwear when patient is off stretcher. Physically safe environment: no spills, clutter or unnecessary equipment. Stretcher in lowest position, wheels locked, appropriate side rails in place. Provide visual cue, wrist band, yellow gown, etc. Monitor gait and stability. Monitor for mental status changes and reorient to person, place, and time. Review medications for side effects contributing to fall risk. Reinforce activity limits and safety measures with patient and family.

## 2022-08-28 NOTE — H&P ADULT - NSICDXPASTMEDICALHX_GEN_ALL_CORE_FT
PAST MEDICAL HISTORY:  HLD (hyperlipidemia)     HTN (hypertension)     NSCLC metastatic to liver     Type 2 diabetes mellitus

## 2022-08-28 NOTE — PATIENT PROFILE ADULT - FALL HARM RISK - HARM RISK INTERVENTIONS

## 2022-08-28 NOTE — H&P ADULT - NSHPPHYSICALEXAM_GEN_ALL_CORE
Vital Signs (24 Hrs):  T(C): 35.6 (08-28-22 @ 15:59), Max: 36.1 (08-28-22 @ 07:05)  HR: 100 (08-28-22 @ 15:59) (96 - 100)  BP: 148/67 (08-28-22 @ 15:59) (119/65 - 148/67)  RR: 18 (08-28-22 @ 15:59) (18 - 18)  SpO2: 98% (08-28-22 @ 07:05) (98% - 98%)    PHYSICAL EXAM:  GENERAL: NAD, well-developed  SKIN: + bandage over the bridge of nose with some abrasions to nose at distal tip  HEAD:  Atraumatic, Normocephalic  EYES: EOMI, PERRLA, conjunctiva and sclera clear  NECK: Supple, No JVD  CHEST/LUNG: Coarse b/s b/l  HEART: Regular rate and rhythm; No murmurs, rubs, or gallops  ABDOMEN: Soft, Nontender, Nondistended; Bowel sounds present  EXTREMITIES:  No clubbing, cyanosis, or edema  CNS: AAOx3

## 2022-08-28 NOTE — ED PROVIDER NOTE - CLINICAL SUMMARY MEDICAL DECISION MAKING FREE TEXT BOX
77yM HTN DLD DM lung cancer w/ liver mets p/w lightheadedness and fall.  No focal neuro deficits.  EKG w/o new ischemia/arrhythmia.  CXR w/o ptx or pna.  Labs w/ severe anemia, worsened compared to baseline (7.5, down from 8.5) and mild leukocytosis but normal electrolytes and renal function.  Imaging w/ nasal fracture (possibly open fx given +facial lacs) and worsening hepatic mets and associated lymphadenopathy but no intraabd trauma.  Lacs repaired and bedside and pt given 1 dose IV abx for ?open nasal fx.  Pt to be admitted for further workup for his fall/near syncope.

## 2022-08-28 NOTE — ED PROVIDER NOTE - NS ED ROS FT
Constitutional: No fever  Eyes:  No visual changes, eye pain, or discharge.  ENMT: +epistaxis No hearing changes, ear pain, sore throat, runny nose, or difficulty swallowing  Cardiac:  No chest pain, SOB or edema. No chest pain with exertion.  Respiratory:  No cough or respiratory distress.   GI:  No nausea, vomiting, diarrhea or abdominal pain.  :  No dysuria, frequency or burning.  MS: +neck pain. No myalgia, muscle weakness, joint pain or back pain.  Neuro:  No headache or weakness.  No LOC.  Skin: +lac. No skin rash.

## 2022-08-28 NOTE — H&P ADULT - NSHPLABSRESULTS_GEN_ALL_CORE
7.5    14.19 )-----------( 294      ( 28 Aug 2022 08:10 )             23.3       08-28    136  |  98  |  27<H>  ----------------------------<  143<H>  4.5   |  24  |  1.1    Ca    9.5      28 Aug 2022 08:10    TPro  6.7  /  Alb  3.7  /  TBili  <0.2  /  DBili  x   /  AST  24  /  ALT  19  /  AlkPhos  110  08-28          PT/INR - ( 28 Aug 2022 08:10 )   PT: 13.80 sec;   INR: 1.20 ratio         PTT - ( 28 Aug 2022 08:10 )  PTT:29.8 sec    Lactate Trend  08-28 @ 08:10 Lactate:1.9     CT Chest, A/P w/IV    IMPRESSION:  Since 7/31/2022:    1. No evidence of acute traumatic intrathoracic or intra-abdominal   pathology.    2. New and increased hepatic metastases, catalogued above.    3. Slight increase in size of previously seen abdominal lymphadenopathy.   Enlarged retroperitoneal lymph nodes, including para-aortic 1.7 cm short   axis lymph node, previously 1.1 cm.    4. Persistent left mainstem bronchus debris or secretions; correlate for   aspiration. Increased right basilar and unchanged rightperihilar   reticular and irregular opacities mixed with post therapy change;   findings may be inflammatory or infectious in etiology. Left basilar   focal opacification, likely infectious or inflammatory in etiology.    5. Unchanged mediastinal andright hilar lymphadenopathy, including   retrotracheal/paraesophageal 1 cm short axis lymph node.    6. Again, no CT correlate for 4/18/2022 PET/CT FDG avid bone lesions.    Xray Pelvis    Findings/  impression: No evidence of acute displaced fracture or dislocation.   Degenerative changes.    CXR    Impression:  Hazy right lung opacities.    CT C-spine, Head, Maxillofacial    IMPRESSION:  CT head: No evidence of acute transcortical infarct, acute intracranial   hemorrhage or mass effect.    CT cervical spine: No acute fracture or traumatic subluxation.    CT maxillofacial bones: Soft tissue swelling of the nose. Nondisplaced   angulated fracture of the right nasal bone. No radiopaque foreign body.

## 2022-08-28 NOTE — H&P ADULT - ASSESSMENT
77M w/PMHx of NSCLC Ca Stage 3 on Chemo (last session 7/21/22), HTN/HLD, DM2 presents to ED with complaints of fall, found to have PNA and admitted to medicine service for further management    #s/p fall w/vertigo  - admit to medicine service  - upgrade to telemetry to monitor for arrythmia x 24h  - check CE at 1900 and AM  - Last ECHO 6/22, [email or web address]  No need to recheck  - check orthostatics  - Meclizine 25mg PO TID PRN  - Fall precautions  - If worsening or remains asymptomatic will consider Neuro eval  - PT eval    #B/L Lung opacities with possible PNA (Gram Negative)  - Cefepime 1g q8h  - Vanco 1g q12h w/trough q4th dose  - check MRSA nasal swab  - check procal  - check uStrep, uLegionella  - check BCx/UCx/UA  - check Sputum Cx  - ID c/s    #NSCLC w/Liver metastasis  - Heme/Onc FU outpatient    #HTN/HLD  - c/w home meds  - DASH diet    #DM2  - Hold Metformin, resume on discharge  - FS AC TID w/ss coverage, consider basal/bolus coverage if persistently hyperglycemic  - check A1C  - CHO diet

## 2022-08-28 NOTE — ED PROVIDER NOTE - CARE PLAN
Principal Discharge DX:	Near-syncope or syncope  Secondary Diagnosis:	Pneumonia  Secondary Diagnosis:	Fall   1

## 2022-08-28 NOTE — ED ADULT TRIAGE NOTE - HISTORY OF COVID-19 VACCINATION
Drug Administration Record    Prior to injection, verified patient identity using patient's name and date of birth.  Due to injection administration, patient instructed to remain in clinic for 15 minutes  afterwards, and to report any adverse reaction to me immediately.    Drug Name: triamcinolone acetonide(kenalog)  Dose: 1mL of triamcinolone 10mg/mL, 10mg dose  Route administered: ID  NDC #: Kenalog-10 (4395-5739-67)  Amount of waste(mL):4mL  Reason for waste: Multi dose vial    LOT #: [id number]  SITE: see provider note  : [employer]  EXPIRATION DATE: 05/2023     Vaccine status unknown

## 2022-08-28 NOTE — CHART NOTE - NSCHARTNOTEFT_GEN_A_CORE
Called by nurse for iv infiltrated. In additon, pt refuses blood work or iv placement if not done with US. As per nurse surgical PA will try to place and draw blood with guided US.  On Exam: mild swelling to left upper extremities.  Plan elevated arm

## 2022-08-28 NOTE — H&P ADULT - HISTORY OF PRESENT ILLNESS
77M w/PMHx of NSCLC Ca Stage 3 on Chemo (last session 7/21/22), HTN/HLD, DM2 presents to ED with complaints of fall.  Patient reports he was at his baseline when today he got up from a seated position, felt dizzy, hit his leg on the table and fell onto his face.  He reports his son heard him fall and he was taken to the ED for eval.  Patient denies LOC or near syncope.  He Reports he has stopped chemo 2/2 worsening weakness and SOB since his last session 7/21/22.  He reports his SOB is at baseline over the past month.  He endorses intermittent cough with white sputum production  No fever chills.  No CP.  No abdominal complaints.

## 2022-08-28 NOTE — ED PROVIDER NOTE - OBJECTIVE STATEMENT
78 y/o M with PMH HTN, HLD, DM, 78 y/o M with PMH HTN, HLD, DM, lung CA w liver mets s/p chemo presenting after fall this morning. Pt states he felt lightheaded prior to falling, fell forward and hit face on coffee table. Denies syncope/LOC. C/o neck pain, nose bleed, nasal lac. No headache or vision change. Per pt's son who is at bedside, pt elected to stop chemo 1 month ago due to frequent falls 2/2 chemo but patient has continued to have frequent lightheadedness and fall

## 2022-08-28 NOTE — ED PROVIDER NOTE - ATTENDING CONTRIBUTION TO CARE
77yM HTN DLD DM lung cancer w/ known liver mets on chemo p/w fall/near syncope - lightheaded, fell forward and cut up his face against the coffee table.  Last tdap UTD.  No CP and pt denies LOC.  Family at bedside reports hx of recent frequent falls and lightheadedness similar to today's episode; they even stopped chemo 1mo ago because of this, though falls and sx have persisted.

## 2022-08-28 NOTE — ED PROVIDER NOTE - PHYSICAL EXAMINATION
CONSTITUTIONAL: Well-developed; well-nourished; in no acute distress. GCS 15, speaking in full sentences, moving all extremities  SKIN: Warm, dry  HEAD: Normocephalic; see below  EYES: PERRL, EOMI, no conjunctival erythema. Left periorbital ecchymosis, orbits nontender  ENT: Dried blood at b/l nares; no active bleeding from nares; airway clear, mucous membranes moist. 1.4 cm lac to nasal bridge  NECK: No midline spinal tenderness. FROM  CARD: +S1, S2 no murmurs, gallops, or rubs. Regular rate and rhythm. radial 2+ b/l, no chest wall tenderness or crepitus  RESP: No wheezes, rales or rhonchi. CTABL  ABD: soft ntnd, no rebound, no guarding, no rigidity  EXT: moves all extremities,  No clubbing, cyanosis or edema. Pelvis stable  NEURO: Alert, oriented, grossly unremarkable, cn ii-xii grossly intact, follows commands  PSYCH: Cooperative, appropriate.

## 2022-08-28 NOTE — ED PROVIDER NOTE - NS ED ATTENDING STATEMENT MOD
This was a shared visit with the JONNY. I reviewed and verified the documentation and independently performed the documented: Attending with

## 2022-08-29 NOTE — PHYSICAL THERAPY INITIAL EVALUATION ADULT - GENERAL OBSERVATIONS, REHAB EVAL
11:05-11:30 Chart reviewed. Pt encountered semireclined in bed, may be seen by Physical Therapist as confirmed with Nurse. Patient denied pain at rest and ready to get up now bit :can get dizzy'; +tele; heplock LUE;; +bandage over nose

## 2022-08-29 NOTE — PHYSICAL THERAPY INITIAL EVALUATION ADULT - GAIT DEVIATIONS NOTED, PT EVAL
stooped posture, dec heel strike/pushoff/decreased daron/decreased step length/decreased weight-shifting ability

## 2022-08-29 NOTE — PROGRESS NOTE ADULT - SUBJECTIVE AND OBJECTIVE BOX
CC.  Fall  HPI.  Patient reports dizziness when getting up from sitted position  Offers no other complaints               Constitutional: No fever, fatigue or weight loss.  Skin: No rash.  Eyes: No recent vision problems or eye pain.  ENT: No congestion, ear pain, or sore throat.  Endocrine: No thyroid problems.  Cardiovascular: No chest pain or palpation.  Respiratory: No cough, shortness of breath, congestion, or wheezing.  Gastrointestinal: No abdominal pain, nausea, vomiting, or diarrhea.  Genitourinary: No dysuria.  Musculoskeletal: No joint swelling.  Neurologic: No headache.      Vital Signs Last 24 Hrs  T(C): 35.9 (08-29-22 @ 05:00), Max: 35.9 (08-28-22 @ 20:50)  T(F): 96.6 (08-29-22 @ 05:00), Max: 96.7 (08-28-22 @ 20:50)  HR: 96 (08-29-22 @ 05:00) (96 - 112)  BP: 128/59 (08-29-22 @ 05:00) (118/58 - 148/67)  BP(mean): --  RR: 16 (08-28-22 @ 20:50) (16 - 18)  SpO2: 97% (08-28-22 @ 20:50) (97% - 97%)        PHYSICAL EXAM-  GENERAL: NAD,    HEAD:  Atraumatic, Normocephalic  EYES: EOMI, PERRLA, conjunctiva and sclera clear  NECK: Supple, No JVD, Normal thyroid  NERVOUS SYSTEM:  Alert & Oriented X3, Moving all extremities  CHEST/LUNG: Clear to percussion bilaterally; No rales, rhonchi, wheezing, or rubs  HEART: Regular rate and rhythm; No murmurs, rubs, or gallops  ABDOMEN: Soft, Nontender, Nondistended; Bowel sounds present  EXTREMITIES:  No clubbing, cyanosis, or edema  SKIN: No rashes or lesions                                  7.4    13.47 )-----------( 299      ( 29 Aug 2022 07:30 )             23.7     08-28    136  |  98  |  27<H>  ----------------------------<  143<H>  4.5   |  24  |  1.1    Ca    9.5      28 Aug 2022 08:10    TPro  6.7  /  Alb  3.7  /  TBili  <0.2  /  DBili  x   /  AST  24  /  ALT  19  /  AlkPhos  110  08-28    CARDIAC MARKERS ( 29 Aug 2022 07:30 )  x     / <0.01 ng/mL / 109 U/L / x     / 2.1 ng/mL          PT/INR - ( 28 Aug 2022 08:10 )   PT: 13.80 sec;   INR: 1.20 ratio         PTT - ( 28 Aug 2022 08:10 )  PTT:29.8 sec        MEDICATIONS  (STANDING):  aspirin  chewable 81 milliGRAM(s) Oral daily  atorvastatin 40 milliGRAM(s) Oral at bedtime  cefepime   IVPB 1000 milliGRAM(s) IV Intermittent every 8 hours  dextrose 5%. 1000 milliLiter(s) (50 mL/Hr) IV Continuous <Continuous>  dextrose 50% Injectable 25 Gram(s) IV Push once  dronabinol 2.5 milliGRAM(s) Oral two times a day  enoxaparin Injectable 40 milliGRAM(s) SubCutaneous every 24 hours  folic acid 1 milliGRAM(s) Oral daily  glucagon  Injectable 1 milliGRAM(s) IntraMuscular once  insulin lispro (ADMELOG) corrective regimen sliding scale   SubCutaneous three times a day before meals  lisinopril 5 milliGRAM(s) Oral daily  metoprolol succinate ER 25 milliGRAM(s) Oral daily  vancomycin  IVPB 1000 milliGRAM(s) IV Intermittent every 12 hours    MEDICATIONS  (PRN):  acetaminophen     Tablet .. 650 milliGRAM(s) Oral every 6 hours PRN Temp greater or equal to 38C (100.4F), Mild Pain (1 - 3)  benzonatate 100 milliGRAM(s) Oral every 8 hours PRN Cough  dextrose Oral Gel 15 Gram(s) Oral once PRN Blood Glucose LESS THAN 70 milliGRAM(s)/deciliter  meclizine 25 milliGRAM(s) Oral every 8 hours PRN Dizziness  melatonin 3 milliGRAM(s) Oral at bedtime PRN Insomnia  ondansetron Injectable 4 milliGRAM(s) IV Push every 8 hours PRN Nausea and/or Vomiting          RADIOLOGY RESULTS:   CC.  Fall  HPI.  Patient reports dizziness when getting up from sitted position  Offers no other complaints               Constitutional: No fever, fatigue or weight loss.  Skin: No rash.  Eyes: No recent vision problems or eye pain.  ENT: No congestion, ear pain, or sore throat.  Endocrine: No thyroid problems.  Cardiovascular: No chest pain or palpation.  Respiratory: No cough, shortness of breath, congestion, or wheezing.  Gastrointestinal: No abdominal pain, nausea, vomiting, or diarrhea.  Genitourinary: No dysuria.  Musculoskeletal: No joint swelling.  Neurologic: No headache.      Vital Signs Last 24 Hrs  T(C): 35.9 (08-29-22 @ 05:00), Max: 35.9 (08-28-22 @ 20:50)  T(F): 96.6 (08-29-22 @ 05:00), Max: 96.7 (08-28-22 @ 20:50)  HR: 96 (08-29-22 @ 05:00) (96 - 112)  BP: 128/59 (08-29-22 @ 05:00) (118/58 - 148/67)  BP(mean): --  RR: 16 (08-28-22 @ 20:50) (16 - 18)  SpO2: 97% (08-28-22 @ 20:50) (97% - 97%)        PHYSICAL EXAM-  GENERAL: NAD,    HEAD:  Atraumatic, Normocephalic  EYES: EOMI, PERRLA, conjunctiva and sclera clear  NECK: Supple, No JVD, Normal thyroid  NERVOUS SYSTEM:  Alert & Oriented X3, Moving all extremities  CHEST/LUNG: Clear to percussion bilaterally; No rales, rhonchi, wheezing, or rubs  HEART: Regular rate and rhythm; No murmurs, rubs, or gallops  ABDOMEN: Soft, Nontender, Nondistended; Bowel sounds present  EXTREMITIES:  No clubbing, cyanosis, or edema  SKIN: No rashes or lesions                                  7.4    13.47 )-----------( 299      ( 29 Aug 2022 07:30 )             23.7     08-28    136  |  98  |  27<H>  ----------------------------<  143<H>  4.5   |  24  |  1.1    Ca    9.5      28 Aug 2022 08:10    TPro  6.7  /  Alb  3.7  /  TBili  <0.2  /  DBili  x   /  AST  24  /  ALT  19  /  AlkPhos  110  08-28    CARDIAC MARKERS ( 29 Aug 2022 07:30 )  x     / <0.01 ng/mL / 109 U/L / x     / 2.1 ng/mL          PT/INR - ( 28 Aug 2022 08:10 )   PT: 13.80 sec;   INR: 1.20 ratio         PTT - ( 28 Aug 2022 08:10 )  PTT:29.8 sec        MEDICATIONS  (STANDING):  aspirin  chewable 81 milliGRAM(s) Oral daily  atorvastatin 40 milliGRAM(s) Oral at bedtime  cefepime   IVPB 1000 milliGRAM(s) IV Intermittent every 8 hours  dextrose 5%. 1000 milliLiter(s) (50 mL/Hr) IV Continuous <Continuous>  dextrose 50% Injectable 25 Gram(s) IV Push once  dronabinol 2.5 milliGRAM(s) Oral two times a day  enoxaparin Injectable 40 milliGRAM(s) SubCutaneous every 24 hours  folic acid 1 milliGRAM(s) Oral daily  glucagon  Injectable 1 milliGRAM(s) IntraMuscular once  insulin lispro (ADMELOG) corrective regimen sliding scale   SubCutaneous three times a day before meals  lisinopril 5 milliGRAM(s) Oral daily  metoprolol succinate ER 25 milliGRAM(s) Oral daily  vancomycin  IVPB 1000 milliGRAM(s) IV Intermittent every 12 hours    MEDICATIONS  (PRN):  acetaminophen     Tablet .. 650 milliGRAM(s) Oral every 6 hours PRN Temp greater or equal to 38C (100.4F), Mild Pain (1 - 3)  benzonatate 100 milliGRAM(s) Oral every 8 hours PRN Cough  dextrose Oral Gel 15 Gram(s) Oral once PRN Blood Glucose LESS THAN 70 milliGRAM(s)/deciliter  meclizine 25 milliGRAM(s) Oral every 8 hours PRN Dizziness  melatonin 3 milliGRAM(s) Oral at bedtime PRN Insomnia  ondansetron Injectable 4 milliGRAM(s) IV Push every 8 hours PRN Nausea and/or Vomiting      Imaging Personally Reviewed:     [x ] YES  [ ] NO    Consultant(s) Notes Reviewed:  [x ] YES  [ ] NO    Care Discussed with Consultants/Other Providers [x ] YES  [ ] No medical contraindication for discharge

## 2022-08-29 NOTE — CONSULT NOTE ADULT - SUBJECTIVE AND OBJECTIVE BOX
Patient is a 77y old  Male with PMHx of NSCLC Ca Stage 3 on Chemo (last session 7/21/22), HTN/HLD, DM2 presents to ER for evaluation  of fall.  Patient reports when he got up from a seated position, felt dizzy, hit his leg on the table and fell onto his face.  He reports his son heard him fall and he has brought in to the ER for eval.  Patient denies LOC or near syncope.  He Reports he has stopped chemo 2/2 worsening weakness and SOB since his last session 7/21/22.  He reports his SOB is at baseline over the past month.  He endorses intermittent cough with white sputum production  No fever chills.  On admission, he found to have no fever but leukocytosis. The CT chest finding are consistent with Aspiration pneumonia. He has  started On Cefepime and IV vancomycin, and the ID consult requested to assist with further evalution and antibiotic management.      REVIEW OF SYSTEMS: Total of twelve systems have been reviewed  and found to be negative unless mentioned in HPI      PAST MEDICAL & SURGICAL HISTORY:  HTN (hypertension)  HLD (hyperlipidemia)  Type 2 diabetes mellitus  NSCLC metastatic to liver  No significant past surgical history      SOCIAL HISTORY  Alcohol: Does not drink  Tobacco: Does not smoke  Illicit substance use: None      FAMILY HISTORY: Non contributory to the present illness        ALLERGIES: No Known Allergies      Vital Signs Last 24 Hrs  T(C): 35.9 (29 Aug 2022 05:00), Max: 35.9 (28 Aug 2022 20:50)  T(F): 96.6 (29 Aug 2022 05:00), Max: 96.7 (28 Aug 2022 20:50)  HR: 96 (29 Aug 2022 05:00) (96 - 112)  BP: 128/59 (29 Aug 2022 05:00) (118/58 - 128/59)  BP(mean): --  RR: 16 (28 Aug 2022 20:50) (16 - 16)  SpO2: 97% (28 Aug 2022 20:50) (97% - 97%)    Parameters below as of 28 Aug 2022 20:50  Patient On (Oxygen Delivery Method): room air        PHYSICAL EXAM:  GENERAL: Not in distress   CHEST/LUNG: Not using accessory muscles   HEART: s1 and s2 present  ABDOMEN:  Nontender and  Nondistended  EXTREMITIES: No pedal  edema  CNS: Awake and Alert      LABS:                        7.4    13.47 )-----------( 299      ( 29 Aug 2022 07:30 )             23.7     08-28    136  |  98  |  27<H>  ----------------------------<  143<H>  4.5   |  24  |  1.1    Ca    9.5      28 Aug 2022 08:10    TPro  6.7  /  Alb  3.7  /  TBili  <0.2  /  DBili  x   /  AST  24  /  ALT  19  /  AlkPhos  110  08-28    PT/INR - ( 28 Aug 2022 08:10 )   PT: 13.80 sec;   INR: 1.20 ratio     PTT - ( 28 Aug 2022 08:10 )  PTT:29.8 sec        CAPILLARY BLOOD GLUCOSE  POCT Blood Glucose.: 188 mg/dL (29 Aug 2022 16:33)  POCT Blood Glucose.: 138 mg/dL (29 Aug 2022 12:04)  POCT Blood Glucose.: 132 mg/dL (29 Aug 2022 08:07)  POCT Blood Glucose.: 268 mg/dL (28 Aug 2022 21:44)      MEDICATIONS  (STANDING):  aspirin  chewable 81 milliGRAM(s) Oral daily  atorvastatin 40 milliGRAM(s) Oral at bedtime  cefepime   IVPB 1000 milliGRAM(s) IV Intermittent every 8 hours  dextrose 5%. 1000 milliLiter(s) (50 mL/Hr) IV Continuous <Continuous>  dextrose 50% Injectable 25 Gram(s) IV Push once  dronabinol 2.5 milliGRAM(s) Oral two times a day  enoxaparin Injectable 40 milliGRAM(s) SubCutaneous every 24 hours  folic acid 1 milliGRAM(s) Oral daily  glucagon  Injectable 1 milliGRAM(s) IntraMuscular once  insulin lispro (ADMELOG) corrective regimen sliding scale   SubCutaneous three times a day before meals  lisinopril 5 milliGRAM(s) Oral daily  metoprolol succinate ER 25 milliGRAM(s) Oral daily  vancomycin  IVPB 1000 milliGRAM(s) IV Intermittent every 12 hours    MEDICATIONS  (PRN):  acetaminophen     Tablet .. 650 milliGRAM(s) Oral every 6 hours PRN Temp greater or equal to 38C (100.4F), Mild Pain (1 - 3)  benzonatate 100 milliGRAM(s) Oral every 8 hours PRN Cough  dextrose Oral Gel 15 Gram(s) Oral once PRN Blood Glucose LESS THAN 70 milliGRAM(s)/deciliter  meclizine 25 milliGRAM(s) Oral every 8 hours PRN Dizziness  melatonin 3 milliGRAM(s) Oral at bedtime PRN Insomnia  ondansetron Injectable 4 milliGRAM(s) IV Push every 8 hours PRN Nausea and/or Vomiting        RADIOLOGY & ADDITIONAL TESTS:    < from: CT Abdomen and Pelvis w/ IV Cont (08.28.22 @ 13:18) >  1. No evidence of acute traumatic intrathoracic or intra-abdominal   pathology.    2. New and increased hepatic metastases, catalogued above.    3. Slight increase in size of previously seen abdominal lymphadenopathy.   Enlarged retroperitoneal lymph nodes, including para-aortic 1.7 cm short   axis lymph node, previously 1.1 cm.    4. Persistent left mainstem bronchus debris or secretions; correlate for   aspiration. Increased right basilar and unchanged rightperihilar   reticular and irregular opacities mixed with post therapy change;   findings may be inflammatory or infectious in etiology. Left basilar   focal opacification, likely infectious or inflammatory in etiology.    5. Unchanged mediastinal andright hilar lymphadenopathy, including   retrotracheal/paraesophageal 1 cm short axis lymph node.    6. Again, no CT correlate for 4/18/2022 PET/CT FDG avid bone lesions.    < end of copied text >  < from: CT Chest w/ IV Cont (08.28.22 @ 13:18) >    LUNGS, PLEURA, AIRWAYS: Persistent left mainstem bronchus debris or   secretions; correlate for aspiration. Is unchanged trace right pleural   effusion. No pneumothorax. Unchanged 0.6 cm nodule within the posterior   left upper lobe (series 3, image 74). Increased right basilar and   unchanged right perihilar reticular and irregular opacities mixed with   post therapy change; findings may be inflammatory or infectious in   etiology. Left basilar focal opacification, likely infectious or   inflammatory in etiology.      MICROBIOLOGY DATA:    COVID-19 PCR (08.28.22 @ 11:34)   COVID-19 PCR: NotDetec:           Patient is a 77y old  Male with PMHx of NSCLC Ca Stage 3 on Chemo (last session 7/21/22), HTN/HLD, DM2 presents to ER for evaluation  of fall.  Patient reports when he got up from a seated position, felt dizzy, hit his leg on the table and fell onto his face.  He reports his son heard him fall and he has brought in to the ER for eval.  Patient denies LOC or near syncope.  He Reports he has stopped chemo 2/2 worsening weakness and SOB since his last session 7/21/22.  He reports his SOB is at baseline over the past month.  He endorses intermittent cough with white sputum production  No fever chills.  On admission, he found to have no fever but leukocytosis. The CT chest finding are consistent with Aspiration pneumonia. He has  started On Cefepime and IV vancomycin, and the ID consult requested to assist with further evalution and antibiotic management.      REVIEW OF SYSTEMS: Total of twelve systems have been reviewed  and found to be negative unless mentioned in HPI      PAST MEDICAL & SURGICAL HISTORY:  HTN (hypertension)  HLD (hyperlipidemia)  Type 2 diabetes mellitus  NSCLC metastatic to liver  No significant past surgical history      SOCIAL HISTORY  Alcohol: Does not drink  Tobacco: Does not smoke  Illicit substance use: None      FAMILY HISTORY: Non contributory to the present illness      ALLERGIES: No Known Allergies      Vital Signs Last 24 Hrs  T(C): 35.9 (29 Aug 2022 05:00), Max: 35.9 (28 Aug 2022 20:50)  T(F): 96.6 (29 Aug 2022 05:00), Max: 96.7 (28 Aug 2022 20:50)  HR: 96 (29 Aug 2022 05:00) (96 - 112)  BP: 128/59 (29 Aug 2022 05:00) (118/58 - 128/59)  BP(mean): --  RR: 16 (28 Aug 2022 20:50) (16 - 16)  SpO2: 97% (28 Aug 2022 20:50) (97% - 97%)    Parameters below as of 28 Aug 2022 20:50  Patient On (Oxygen Delivery Method): room air        PHYSICAL EXAM:  GENERAL: Not in distress   HEENT: Nose swollen and bruised, bandage in placed  CHEST/LUNG: Not using accessory muscles   HEART: s1 and s2 present  ABDOMEN:  Nontender and  Nondistended  EXTREMITIES: No pedal  edema  CNS: Awake and Alert      LABS:                        7.4    13.47 )-----------( 299      ( 29 Aug 2022 07:30 )             23.7       08-28    136  |  98  |  27<H>  ----------------------------<  143<H>  4.5   |  24  |  1.1    Ca    9.5      28 Aug 2022 08:10    TPro  6.7  /  Alb  3.7  /  TBili  <0.2  /  DBili  x   /  AST  24  /  ALT  19  /  AlkPhos  110  08-28    PT/INR - ( 28 Aug 2022 08:10 )   PT: 13.80 sec;   INR: 1.20 ratio     PTT - ( 28 Aug 2022 08:10 )  PTT:29.8 sec        CAPILLARY BLOOD GLUCOSE  POCT Blood Glucose.: 188 mg/dL (29 Aug 2022 16:33)  POCT Blood Glucose.: 138 mg/dL (29 Aug 2022 12:04)  POCT Blood Glucose.: 132 mg/dL (29 Aug 2022 08:07)  POCT Blood Glucose.: 268 mg/dL (28 Aug 2022 21:44)      MEDICATIONS  (STANDING):  aspirin  chewable 81 milliGRAM(s) Oral daily  atorvastatin 40 milliGRAM(s) Oral at bedtime  cefepime   IVPB 1000 milliGRAM(s) IV Intermittent every 8 hours  dextrose 5%. 1000 milliLiter(s) (50 mL/Hr) IV Continuous <Continuous>  dextrose 50% Injectable 25 Gram(s) IV Push once  dronabinol 2.5 milliGRAM(s) Oral two times a day  enoxaparin Injectable 40 milliGRAM(s) SubCutaneous every 24 hours  folic acid 1 milliGRAM(s) Oral daily  glucagon  Injectable 1 milliGRAM(s) IntraMuscular once  insulin lispro (ADMELOG) corrective regimen sliding scale   SubCutaneous three times a day before meals  lisinopril 5 milliGRAM(s) Oral daily  metoprolol succinate ER 25 milliGRAM(s) Oral daily  vancomycin  IVPB 1000 milliGRAM(s) IV Intermittent every 12 hours    MEDICATIONS  (PRN):  acetaminophen     Tablet .. 650 milliGRAM(s) Oral every 6 hours PRN Temp greater or equal to 38C (100.4F), Mild Pain (1 - 3)  benzonatate 100 milliGRAM(s) Oral every 8 hours PRN Cough  dextrose Oral Gel 15 Gram(s) Oral once PRN Blood Glucose LESS THAN 70 milliGRAM(s)/deciliter  meclizine 25 milliGRAM(s) Oral every 8 hours PRN Dizziness  melatonin 3 milliGRAM(s) Oral at bedtime PRN Insomnia  ondansetron Injectable 4 milliGRAM(s) IV Push every 8 hours PRN Nausea and/or Vomiting        RADIOLOGY & ADDITIONAL TESTS:    < from: CT Abdomen and Pelvis w/ IV Cont (08.28.22 @ 13:18) >  1. No evidence of acute traumatic intrathoracic or intra-abdominal   pathology.    2. New and increased hepatic metastases, catalogued above.    3. Slight increase in size of previously seen abdominal lymphadenopathy.   Enlarged retroperitoneal lymph nodes, including para-aortic 1.7 cm short   axis lymph node, previously 1.1 cm.    4. Persistent left mainstem bronchus debris or secretions; correlate for   aspiration. Increased right basilar and unchanged rightperihilar   reticular and irregular opacities mixed with post therapy change;   findings may be inflammatory or infectious in etiology. Left basilar   focal opacification, likely infectious or inflammatory in etiology.    5. Unchanged mediastinal andright hilar lymphadenopathy, including   retrotracheal/paraesophageal 1 cm short axis lymph node.    6. Again, no CT correlate for 4/18/2022 PET/CT FDG avid bone lesions.      < from: CT Chest w/ IV Cont (08.28.22 @ 13:18) >    LUNGS, PLEURA, AIRWAYS: Persistent left mainstem bronchus debris or   secretions; correlate for aspiration. Is unchanged trace right pleural effusion. No pneumothorax. Unchanged 0.6 cm nodule within the posterior   left upper lobe (series 3, image 74). Increased right basilar and  unchanged right perihilar reticular and irregular opacities mixed with   post therapy change; findings may be inflammatory or infectious in   etiology. Left basilar focal opacification, likely infectious or   inflammatory in etiology.      MICROBIOLOGY DATA:    COVID-19 PCR (08.28.22 @ 11:34)   COVID-19 PCR: NotDetec:

## 2022-08-29 NOTE — CONSULT NOTE ADULT - ASSESSMENT
Patient is a 77y old  Male with PMHx of NSCLC Ca Stage 3 on Chemo (last session 7/21/22), HTN/HLD, DM2 presents to ER for evaluation  of fall.  Patient reports when he got up from a seated position, felt dizzy, hit his leg on the table and fell onto his face.  He reports his son heard him fall and he has brought in to the ER for eval.  Patient denies LOC or near syncope.  He Reports he has stopped chemo 2/2 worsening weakness and SOB since his last session 7/21/22.  He reports his SOB is at baseline over the past month.  He endorses intermittent cough with white sputum production  No fever chills.  On admission, he found to have no fever but leukocytosis. The CT chest finding are consistent with Aspiration pneumonia. He has  started On Cefepime and IV vancomycin, and the ID consult requested to assist with further evalution and antibiotic management.    # Aspiration pneumonia  # Leukocytosis  #    would recommend:    1. Please obtain MRSA PCR and procalcitonin level  2. Sputum culture if possible  3. Aspiration precaution    will follow the patient with you and make further recommendation based on the clinical course and Lab results  Thank you for the opportunity to participate in Mr. CASTANEDA's care    Attending Attestation:    Spent more than 65 minutes on total encounter, more than 50 % of the visit was spent counseling and/or coordinating care by the Attending physician.     Patient is a 77y old  Male with PMHx of NSCLC Ca Stage 3 on Chemo (last session 7/21/22), HTN/HLD, DM2 presents to ER for evaluation  of fall.  Patient reports when he got up from a seated position, felt dizzy, hit his leg on the table and fell onto his face.  He reports his son heard him fall and he has brought in to the ER for eval.  Patient denies LOC or near syncope.  He Reports he has stopped chemo 2/2 worsening weakness and SOB since his last session 7/21/22.  He reports his SOB is at baseline over the past month.  He endorses intermittent cough with white sputum production  No fever chills.  On admission, he found to have no fever but leukocytosis. The CT chest finding are consistent with Aspiration pneumonia. He has  started On Cefepime and IV vancomycin, and the ID consult requested to assist with further evalution and antibiotic management.    # Aspiration pneumonia  # Leukocytosis  # S/p Fall  # NSCLC Ca Stage 3 on Chemo (last session 7/21/22),    would recommend:    1. Please obtain MRSA PCR and procalcitonin level  2. Sputum culture if possible  3. Aspiration precaution  4. Continue Cefepime and Add Flagyl and discontinue Vancomycin IF MRSA PCR is NEGATIVE  5. Pain management as needed  6. Supplemental oxygenation and bronchodilator as needed    d/w Patient and Nursing staff     will follow the patient with you and make further recommendation based on the clinical course and Lab results  Thank you for the opportunity to participate in Mr. CASTANEDA's care    Attending Attestation:    Spent more than 65 minutes on total encounter, more than 50 % of the visit was spent counseling and/or coordinating care by the Attending physician.

## 2022-08-29 NOTE — PROGRESS NOTE ADULT - ASSESSMENT
77M w/PMHx of NSCLC Ca Stage 3 on Chemo (last session 7/21/22), HTN/HLD, DM2 presents to ED with complaints of fall, found to have PNA and admitted to medicine service for further management    #s/p fall w/vertigo  - admit to medicine service  - upgrade to telemetry to monitor for arrythmia x 24h  - check CE at 1900 and AM  - Last ECHO 6/22, [email or web address]  No need to recheck  - check orthostatics  - Meclizine 25mg PO TID PRN  - Fall precautions  - If worsening or remains asymptomatic will consider Neuro eval  - PT eval    #B/L Lung opacities with possible PNA (Gram Negative)  - Cefepime 1g q8h  - Vanco 1g q12h w/trough q4th dose  - check MRSA nasal swab  - check procal  - check uStrep, uLegionella  - check BCx/UCx/UA  - check Sputum Cx  - ID c/s    #NSCLC w/Liver metastasis  - Heme/Onc FU outpatient    #HTN/HLD  - c/w home meds  - DASH diet    #DM2  - Hold Metformin, resume on discharge  - FS AC TID w/ss coverage, consider basal/bolus coverage if persistently hyperglycemic  - check A1C  - CHO diet   77M w/PMHx of NSCLC Ca Stage 3 on Chemo (last session 7/21/22), HTN/HLD, DM2 presents to ED with complaints of fall, found to have PNA and admitted to medicine service for further management    #s/p fall w/vertigo  - Telemetry shows no arrythmia.  Head CT scan negative   -to monitor for arrythmia x 24h  - serial trop  - Last ECHO 6/22, [email or web address]  No need to recheck  - f/u orthostatic   - Meclizine 25mg PO TID PRN  - Fall precautions  - neuro checks  - PT eval    #B/L Lung opacities with possible PNA (Gram Negative)  - Cefepime 1g q8h  - Vanco 1g q12h w/trough q4th dose  - check MRSA nasal swab  - check procal  - check uStrep, uLegionella  - check BCx/UCx/UA  - check Sputum Cx  - ID c/s    #NSCLC w/Liver metastasis  - Heme/Onc FU outpatient    #HTN/HLD  - c/w home meds  - DASH diet    #DM2  - Hold Metformin, resume on discharge  - FS AC TID w/ss coverage, consider basal/bolus coverage if persistently hyperglycemic  - check A1C  - CHO diet    #Anemia  Probably due to chemo  -Will transfuse one unit of RBC  -All risks, benefits, and alternatives discussed, seems to understand, and in agreement      #Progress Note Handoff  Pending (specify): as above    Family discussion:  plan of care was discussed with patient  in details.  all questions were answered.  seems to understand, and in agreement  Disposition:  unknown

## 2022-08-29 NOTE — PHYSICAL THERAPY INITIAL EVALUATION ADULT - PERTINENT HX OF CURRENT PROBLEM, REHAB EVAL
78 y/o male admitted with diagnoses of Near Syncope or Syncope, Pneumonia, Fall, after falling forward and hitting nose at home, sustained a non-displaced angulated (R) nasal fracture, s/p laceration repair in the ED; no acute pathology on head CT or acute fracture on Cervical Spine CT and pelvic X-ray

## 2022-08-29 NOTE — PHYSICAL THERAPY INITIAL EVALUATION ADULT - ADDITIONAL COMMENTS
Per patient, they live in private home with 3 steps with brick walls on each side; then 8-10 steps with (R) rail going up to 2nd floor; was walking independently but has a cane as needed

## 2022-08-30 NOTE — PROGRESS NOTE ADULT - SUBJECTIVE AND OBJECTIVE BOX
CC.  Fall  HPI.  Patient reports dizziness resolved  Offers no other complaints               Constitutional: No fever, fatigue or weight loss.  Skin: No rash.  Eyes: No recent vision problems or eye pain.  ENT: No congestion, ear pain, or sore throat.  Endocrine: No thyroid problems.  Cardiovascular: No chest pain or palpation.  Respiratory: No cough, shortness of breath, congestion, or wheezing.  Gastrointestinal: No abdominal pain, nausea, vomiting, or diarrhea.  Genitourinary: No dysuria.  Musculoskeletal: No joint swelling.  Neurologic: No headache.    Vital Signs Last 24 Hrs  T(C): 36.2 (30 Aug 2022 05:09), Max: 36.7 (29 Aug 2022 21:15)  T(F): 97.2 (30 Aug 2022 05:09), Max: 98 (29 Aug 2022 21:15)  HR: 97 (30 Aug 2022 05:09) (88 - 97)  BP: 114/59 (29 Aug 2022 21:15) (114/59 - 114/59)  BP(mean): --  RR: 18 (30 Aug 2022 06:05) (16 - 19)  SpO2: 96% (30 Aug 2022 06:05) (96% - 98%)    Parameters below as of 30 Aug 2022 06:05  Patient On (Oxygen Delivery Method): room air            PHYSICAL EXAM-  GENERAL: NAD,    HEAD:  Atraumatic, Normocephalic  EYES: EOMI, PERRLA, conjunctiva and sclera clear  NECK: Supple, No JVD, Normal thyroid  NERVOUS SYSTEM:  Alert & Oriented X3, Moving all extremities  CHEST/LUNG: Clear to percussion bilaterally; No rales, rhonchi, wheezing, or rubs  HEART: Regular rate and rhythm; No murmurs, rubs, or gallops  ABDOMEN: Soft, Nontender, Nondistended; Bowel sounds present  EXTREMITIES:  No clubbing, cyanosis, or edema  SKIN: No rashes or lesions                              9.4    11.02 )-----------( 276      ( 30 Aug 2022 07:03 )             29.9     08-30    139  |  100  |  33<H>  ----------------------------<  140<H>  4.8   |  27  |  1.5    Ca    9.4      30 Aug 2022 07:03    TPro  6.4  /  Alb  3.4<L>  /  TBili  0.5  /  DBili  x   /  AST  17  /  ALT  15  /  AlkPhos  105  08-30    CARDIAC MARKERS ( 29 Aug 2022 07:30 )  x     / <0.01 ng/mL / 109 U/L / x     / 2.1 ng/mL        Urinalysis Basic - ( 30 Aug 2022 03:15 )    Color: Yellow / Appearance: Clear / S.015 / pH: x  Gluc: x / Ketone: Negative  / Bili: Negative / Urobili: 0.2 mg/dL   Blood: x / Protein: Negative mg/dL / Nitrite: Negative   Leuk Esterase: Negative / RBC: x / WBC x   Sq Epi: x / Non Sq Epi: x / Bacteria: x    MEDICATIONS  (STANDING):  aspirin  chewable 81 milliGRAM(s) Oral daily  atorvastatin 40 milliGRAM(s) Oral at bedtime  cefepime   IVPB 1000 milliGRAM(s) IV Intermittent every 8 hours  dextrose 5%. 1000 milliLiter(s) (50 mL/Hr) IV Continuous <Continuous>  dextrose 50% Injectable 25 Gram(s) IV Push once  dronabinol 2.5 milliGRAM(s) Oral two times a day  enoxaparin Injectable 40 milliGRAM(s) SubCutaneous every 24 hours  folic acid 1 milliGRAM(s) Oral daily  glucagon  Injectable 1 milliGRAM(s) IntraMuscular once  insulin lispro (ADMELOG) corrective regimen sliding scale   SubCutaneous three times a day before meals  lisinopril 5 milliGRAM(s) Oral daily  metoprolol succinate ER 25 milliGRAM(s) Oral daily    MEDICATIONS  (PRN):  acetaminophen     Tablet .. 650 milliGRAM(s) Oral every 6 hours PRN Temp greater or equal to 38C (100.4F), Mild Pain (1 - 3)  benzonatate 100 milliGRAM(s) Oral every 8 hours PRN Cough  dextrose Oral Gel 15 Gram(s) Oral once PRN Blood Glucose LESS THAN 70 milliGRAM(s)/deciliter  meclizine 25 milliGRAM(s) Oral every 8 hours PRN Dizziness  melatonin 3 milliGRAM(s) Oral at bedtime PRN Insomnia  ondansetron Injectable 4 milliGRAM(s) IV Push every 8 hours PRN Nausea and/or Vomiting                                 Imaging Personally Reviewed:     [x ] YES  [ ] NO    Consultant(s) Notes Reviewed:  [x ] YES  [ ] NO    Care Discussed with Consultants/Other Providers [x ] YES  [ ] No medical contraindication for discharge

## 2022-08-30 NOTE — CONSULT NOTE ADULT - SUBJECTIVE AND OBJECTIVE BOX
NAHED CASTANEDA     77y     Male    MRN-[id number]                                                           CC:Patient is a 77y old  Male who presents with a chief complaint of Fall (30 Aug 2022 10:14)      HPI:  77M w/PMHx of NSCLC Ca Stage 3 on Chemo (last session 7/21/22), HTN/HLD, DM2 presents to ED with complaints of fall.  Patient reports he was at his baseline when today he got up from a seated position, felt dizzy, hit his leg on the table and fell onto his face.  He reports his son heard him fall and he was taken to the ED for eval.  Patient denies LOC or near syncope.  He Reports he has stopped chemo 2/2 worsening weakness and SOB since his last session 7/21/22.  He reports his SOB is at baseline over the past month.  He endorses intermittent cough with white sputum production  No fever chills.  No CP.  No abdominal complaints.   (28 Aug 2022 16:22)    Patient seen and examined and history form patient and chart.  Patient says when doing his chemotherapy he gets dizzy, nauseus and feels weak.  He asked his oncologist to extend the gap between chemotherapy treatments to 6 weeks instead of every 3 weeks.  Had an episode similar to the one which led to fall one month ago but didnt fall.  Gets lightheaded when getting up from bed or from sitting intermittently over last few months    ROS:  Constitutional, Neurological, Psychiatric, Eyes, ENT, Cardiovascular, Respiratory, Gastrointestinal, Genitourinary, Musculoskeletal, Integumentary, Endocrine and Heme/Lymph are otherwise negative. Except for noted above    Social History: No smoking, No drinking, No drug use    FAMILY HISTORY: non contributory            Vital Signs Last 24 Hrs  T(C): 36.2 (30 Aug 2022 05:09), Max: 36.7 (29 Aug 2022 21:15)  T(F): 97.2 (30 Aug 2022 05:09), Max: 98 (29 Aug 2022 21:15)  HR: 97 (30 Aug 2022 05:09) (88 - 97)  BP: 114/59 (29 Aug 2022 21:15) (114/59 - 114/59)  BP(mean): --  RR: 18 (30 Aug 2022 06:05) (16 - 19)  SpO2: 96% (30 Aug 2022 06:05) (96% - 98%)    Parameters below as of 30 Aug 2022 06:05  Patient On (Oxygen Delivery Method): room air        Physical Exam:  Constitutional: alert and in no acute distress.  Eyes: the sclera and conjunctiva were normal, pupils were equal in size, round, reactive to light, with normal accommodation and extraocular movements were intact.   Back: no costovertebral angle tenderness and no spinal tenderness.      Neuro Exam:  Orientation: oriented to person, oriented to place and oriented to time.   Attention: normal concentrating ability and visual attention was not decreased.   Language: no difficulty naming common objects, no difficulty repeating a phrase, no difficulty writing a sentence, fluency intact, comprehension intact and reading intact.   Fund of knowledge: displays adequate knowledge of personal past history.   Cranial Nerves: visual fields full to confrontation, pupils equal round and reactive to light, extraocular motion intact, facial sensation intact symmetrically, face symmetrical, hearing was intact bilaterally, tongue and palate midline, head turning and shoulder shrug symmetric and there was no tongue deviation with protrusion.   Motor: muscle tone was normal in all four extremities, muscle strength was normal in all four extremities and normal bulk in all four extremities.   Sensory exam: light touch was intact.   Coordination: there was no past-pointing. no tremor present.   Deep tendon reflexes:   1+ in UE and absent in LE  Plantar responses normal on the right, normal on the left.      NIHSS: 0    Allergies    No Known Allergies    Intolerances       Home Medications:  atorvastatin 40 mg oral tablet: 1 tab(s) orally once a day (28 Aug 2022 08:10)  dronabinol 2.5 mg oral capsule: 1 cap(s) orally 2 times a day (28 Aug 2022 08:10)  folic acid 1 mg oral tablet: 1 tab(s) orally once a day (28 Aug 2022 08:10)  lisinopril 5 mg oral tablet: 1 tab(s) orally once a day (28 Aug 2022 08:10)  metFORMIN 500 mg oral tablet: 1 tab(s) orally 2 times a day (28 Aug 2022 08:10)  METOPROLOL SUCCINATE ER  25 MG TB24: 1 tab(s) orally once a day (28 Aug 2022 08:10)  prochlorperazine 10 mg oral tablet: 1 tab(s) orally every 6 hours (28 Aug 2022 08:10)  Zofran 8 mg oral tablet: 1 tab(s) orally 3 times a day (28 Aug 2022 08:10)      MEDICATIONS  (STANDING):  aspirin  chewable 81 milliGRAM(s) Oral daily  atorvastatin 40 milliGRAM(s) Oral at bedtime  cefepime   IVPB 1000 milliGRAM(s) IV Intermittent every 8 hours  dextrose 5%. 1000 milliLiter(s) (50 mL/Hr) IV Continuous <Continuous>  dextrose 50% Injectable 25 Gram(s) IV Push once  dronabinol 2.5 milliGRAM(s) Oral two times a day  enoxaparin Injectable 40 milliGRAM(s) SubCutaneous every 24 hours  folic acid 1 milliGRAM(s) Oral daily  glucagon  Injectable 1 milliGRAM(s) IntraMuscular once  insulin lispro (ADMELOG) corrective regimen sliding scale   SubCutaneous three times a day before meals  lisinopril 5 milliGRAM(s) Oral daily  metoprolol succinate ER 25 milliGRAM(s) Oral daily  metroNIDAZOLE  IVPB      metroNIDAZOLE  IVPB 500 milliGRAM(s) IV Intermittent every 8 hours  vancomycin  IVPB 1000 milliGRAM(s) IV Intermittent every 12 hours    MEDICATIONS  (PRN):  acetaminophen     Tablet .. 650 milliGRAM(s) Oral every 6 hours PRN Temp greater or equal to 38C (100.4F), Mild Pain (1 - 3)  benzonatate 100 milliGRAM(s) Oral every 8 hours PRN Cough  dextrose Oral Gel 15 Gram(s) Oral once PRN Blood Glucose LESS THAN 70 milliGRAM(s)/deciliter  meclizine 25 milliGRAM(s) Oral every 8 hours PRN Dizziness  melatonin 3 milliGRAM(s) Oral at bedtime PRN Insomnia  ondansetron Injectable 4 milliGRAM(s) IV Push every 8 hours PRN Nausea and/or Vomiting      LABS:                        9.4    11.02 )-----------( 276      ( 30 Aug 2022 07:03 )             29.9     08-30    139  |  100  |  33<H>  ----------------------------<  140<H>  4.8   |  27  |  1.5    Ca    9.4      30 Aug 2022 07:03    TPro  6.4  /  Alb  3.4<L>  /  TBili  0.5  /  DBili  x   /  AST  17  /  ALT  15  /  AlkPhos  105  08-30            Neuro Imaging:  NCHCT:     EEG:    Echo:  Carotid Doppler:  Telemetry:    Assessment / Plan: This is a 77y year old Male presenting with fall and feeling of dizziness described as lightheadedness.  Current episode likely orthostatic syncope, unlikely to be cva/TIA  1. Medical management  2. No further neurological management   3. Please recall for further neurological complaints or questions

## 2022-08-30 NOTE — PROGRESS NOTE ADULT - SUBJECTIVE AND OBJECTIVE BOX
TONYA, NAHED  77y, Male  Allergy: No Known Allergies      LOS  2d    CHIEF COMPLAINT: Fall (29 Aug 2022 18:56)      INTERVAL EVENTS/HPI  - No acute events overnight  - T(F): , Max: 98 (22 @ 21:15)  - Denies any worsening symptoms  - Tolerating medication  - WBC Count: 11.02 (22 @ 07:03)  WBC Count: 13.47 (22 @ 07:30)     - Creatinine, Serum: 1.5 (22 @ 07:03)       ROS  General: Denies rigors, nightsweats  HEENT: Denies headache, rhinorrhea, sore throat, eye pain  CV: Denies CP, palpitations  PULM: Denies wheezing, hemoptysis  GI: Denies hematemesis, hematochezia, melena  : Denies discharge, hematuria  MSK: Denies arthralgias, myalgias  SKIN: Denies rash, lesions  NEURO: Denies paresthesias, weakness  PSYCH: Denies depression, anxiety    VITALS:  T(F): 97.2, Max: 98 (22 @ 21:15)  HR: 97  BP: 114/59  RR: 18Vital Signs Last 24 Hrs  T(C): 36.2 (30 Aug 2022 05:09), Max: 36.7 (29 Aug 2022 21:15)  T(F): 97.2 (30 Aug 2022 05:09), Max: 98 (29 Aug 2022 21:15)  HR: 97 (30 Aug 2022 05:09) (88 - 97)  BP: 114/59 (29 Aug 2022 21:15) (114/59 - 114/59)  BP(mean): --  RR: 18 (30 Aug 2022 06:05) (16 - 19)  SpO2: 96% (30 Aug 2022 06:05) (96% - 98%)    Parameters below as of 30 Aug 2022 06:05  Patient On (Oxygen Delivery Method): room air        PHYSICAL EXAM:  Gen: NAD, resting in bed  HEENT: Normocephalic, atraumatic  Neck: supple, no lymphadenopathy  CV: Regular rate & regular rhythm  Lungs: decreased BS at bases, no fremitus  Abdomen: Soft, BS present  Ext: Warm, well perfused  Neuro: non focal, awake  Skin: no rash, no erythema  Lines: no phlebitis    FH: Non-contributory  Social Hx: Non-contributory    TESTS & MEASUREMENTS:                        9.4    11.02 )-----------( 276      ( 30 Aug 2022 07:03 )             29.9         139  |  100  |  33<H>  ----------------------------<  140<H>  4.8   |  27  |  1.5    Ca    9.4      30 Aug 2022 07:03    TPro  6.4  /  Alb  3.4<L>  /  TBili  0.5  /  DBili  x   /  AST  17  /  ALT  15  /  AlkPhos  105        LIVER FUNCTIONS - ( 30 Aug 2022 07:03 )  Alb: 3.4 g/dL / Pro: 6.4 g/dL / ALK PHOS: 105 U/L / ALT: 15 U/L / AST: 17 U/L / GGT: x           Urinalysis Basic - ( 30 Aug 2022 03:15 )    Color: Yellow / Appearance: Clear / S.015 / pH: x  Gluc: x / Ketone: Negative  / Bili: Negative / Urobili: 0.2 mg/dL   Blood: x / Protein: Negative mg/dL / Nitrite: Negative   Leuk Esterase: Negative / RBC: x / WBC x   Sq Epi: x / Non Sq Epi: x / Bacteria: x          Lactate, Blood: 1.9 mmol/L (22 @ 08:10)      INFECTIOUS DISEASES TESTING  MRSA PCR Result.: Negative (22 @ 07:00)  COVID-19 PCR: NotDetec (22 @ 11:34)  Procalcitonin, Serum: 1.22 (22 @ 12:19)  MRSA PCR Result.: Negative (22 @ 06:50)  Legionella Antigen, Urine: Negative (22 @ 06:49)  Rapid RVP Result: NotDetec (22 @ 16:48)      INFLAMMATORY MARKERS  C-Reactive Protein, Serum: 153 mg/L (22 @ 12:19)      RADIOLOGY & ADDITIONAL TESTS:  I have personally reviewed the last available Chest xray  CXR  Xray Chest 1 View AP/PA:   ACC: [id number] EXAM:  XR CHEST 1 VIEW                          PROCEDURE DATE:  2022          INTERPRETATION:  Clinical History / Reason for exam: Trauma Code    Comparison : Chest radiograph: 2022.    Technique/Positioning: Frontal view of the chest    Findings:    Support devices: None.    Cardiac/mediastinum/hilum: No significant change    Lung parenchyma/Pleura: Hazy right lung opacities. No pneumothorax    Skeleton/soft tissues: No significant change.    Impression:  Hazy right lung opacities.            --- End of Report ---            PURVI MACIAS MD; Attending Radiologist  This document has been electronically signed. Aug 28 2022  1:23PM (22 @ 10:30)      CT  CT Chest w/ IV Cont:   ACC: [id number] EXAM:  CT ABDOMEN AND PELVIS IC                        ACC: [id number] EXAM:  CT CHEST IC                          PROCEDURE DATE:  2022          INTERPRETATION:  CLINICAL HISTORY/REASON FOR EXAM: Trauma to chest,   abdomen and pelvis. Lung cancer. Fall.    TECHNIQUE: Contiguous axial CT images were obtained from the thoracic   inlet to the pubic symphysis with IV contrast administration Oral   contrast was not administered. Reformatted images in the coronal and   sagittal planes were acquired. 3D (MIP) images obtained.    Comparison made with CT chest, abdomen and pelvis 2022, PET/CT   2022    FINDINGS:    CHEST:    LUNGS, PLEURA, AIRWAYS: Persistent left mainstem bronchus debris or   secretions; correlate for aspiration. Is unchanged trace right pleural   effusion. No pneumothorax. Unchanged 0.6 cm nodule within the posterior   left upper lobe (series 3, image 74). Increased right basilar and   unchanged right perihilar reticular and irregular opacities mixed with   post therapy change; findings may be inflammatory or infectious in   etiology. Left basilar focal opacification, likely infectious or   inflammatory in etiology.    THORACIC NODES: Unchanged mediastinal and right hilar lymphadenopathy,   including retrotracheal/paraesophageal 1 cm short axis lymph node (series   3, image 49).    MEDIASTINUM/GREAT VESSELS: Trace pericardial effusion. Heart size is   within normal limits. The aorta and main pulmonary artery are of normal   caliber.    ABDOMEN/PELVIS:    HEPATOBILIARY: Further increased size of segment Kimani liver mass measuring   up to 7.9 cm, previously 6.8 cm, and segment VI hypodense hepatic lesion   measuring 2.1 cm, previously 1.5 cm (series 3, image 321). Additional, at   least 4, new right hepatic lobe probable metastases, including 1.6 cm   segment VI (series 3, image 292) and 2 cm segment V lesions (series 3,   image 264).    SPLEEN: Unremarkable.    PANCREAS: Unremarkable.    ADRENAL GLANDS: Unremarkable.    KIDNEYS: Atrophic right kidney. No hydronephrosis. Bilateral punctate   nonobstructing renal calculi.    ABDOMINOPELVIC NODES: Slight increase in size of previously seen   abdominal lymphadenopathy, including 4.7 x 2.3 cm periportal lymph node,   previously 4.2 x 2.1 cm. Enlarged retroperitoneal lymph nodes, including   para-aortic 1.7 cm short axis lymph node, previously 1.1 cm (series 3,   image 328). Unchanged pelvic lymph nodes.    PELVIC ORGANS: Unremarkable.    PERITONEUM/MESENTERY/BOWEL: No bowel obstruction. No ascites or   pneumoperitoneum.    BONES/SOFT TISSUES: No evidence of acute osseous abnormality. Again, no   CT correlate for 2022 PET/CT FDG avid bone lesions.    OTHER: Vascular calcifications. Bilateral fat-containing inguinal hernias.      IMPRESSION:  Since 2022:    1. No evidence of acute traumatic intrathoracic or intra-abdominal   pathology.    2. New and increased hepatic metastases, catalogued above.    3. Slight increase in size of previously seen abdominal lymphadenopathy.   Enlarged retroperitoneal lymph nodes, including para-aortic 1.7 cm short   axis lymph node, previously 1.1 cm.    4. Persistent left mainstem bronchus debris or secretions; correlate for   aspiration. Increased right basilar and unchanged rightperihilar   reticular and irregular opacities mixed with post therapy change;   findings may be inflammatory or infectious in etiology. Left basilar   focal opacification, likely infectious or inflammatory in etiology.    5. Unchanged mediastinal andright hilar lymphadenopathy, including   retrotracheal/paraesophageal 1 cm short axis lymph node.    6. Again, no CT correlate for 2022 PET/CT FDG avid bone lesions.    --- End of Report ---            CHIARA JEAN BAPTISTE MD; Attending Radiologist  Thisdocument has been electronically signed. Aug 28 2022  2:03PM (22 @ 13:18)      CARDIOLOGY TESTING  12 Lead ECG:   Ventricular Rate 93 BPM    Atrial Rate 93 BPM    P-R Interval 248 ms    QRS Duration 108 ms    Q-T Interval 346 ms    QTC Calculation(Bazett) 430 ms    P Axis 73 degrees    R Axis -53 degrees    T Axis 105 degrees    Diagnosis Line    Sinus rhythm with 1st degree A-V block with Possible Premature atrial  complexes with Aberrant conduction vs end- Diastolic PVC   Left anterior fascicular block  Septal infarct , age undetermined  NS T wave change   Abnormal ECG    Confirmed by OSITO BENSON MD () on 2022 10:46:20 AM (22 @ 07:57)      MEDICATIONS  aspirin  chewable 81 Oral daily  atorvastatin 40 Oral at bedtime  cefepime   IVPB 1000 IV Intermittent every 8 hours  dextrose 5%. 1000 IV Continuous <Continuous>  dextrose 50% Injectable 25 IV Push once  dronabinol 2.5 Oral two times a day  enoxaparin Injectable 40 SubCutaneous every 24 hours  folic acid 1 Oral daily  glucagon  Injectable 1 IntraMuscular once  insulin lispro (ADMELOG) corrective regimen sliding scale  SubCutaneous three times a day before meals  lisinopril 5 Oral daily  metoprolol succinate ER 25 Oral daily  metroNIDAZOLE  IVPB     metroNIDAZOLE  IVPB 500 IV Intermittent every 8 hours  vancomycin  IVPB 1000 IV Intermittent every 12 hours      WEIGHT  Weight (kg): 85.1 (22 @ 15:59)  Creatinine, Serum: 1.5 mg/dL (22 @ 07:03)      ANTIBIOTICS:  cefepime   IVPB 1000 milliGRAM(s) IV Intermittent every 8 hours  metroNIDAZOLE  IVPB      metroNIDAZOLE  IVPB 500 milliGRAM(s) IV Intermittent every 8 hours  vancomycin  IVPB 1000 milliGRAM(s) IV Intermittent every 12 hours      All available historical records have been reviewed

## 2022-08-30 NOTE — PROGRESS NOTE ADULT - ASSESSMENT
77M w/PMHx of NSCLC Ca Stage 3 on Chemo (last session 7/21/22), HTN/HLD, DM2 presents to ED with complaints of fall, found to have PNA and admitted to medicine service for further management    #s/p fall w/vertigo  - Telemetry shows no arrythmia.  Head CT scan negative   -to monitor for arrythmia x 24h  - serial trop   - Last ECHO 6/22, reviewe.d  No need to recheck  - orthostatic +.  IVF and repeat orthostatic BP   - Meclizine 25mg PO TID PRN  - Fall precautions  - neuro checks  - PT eval, refuses rehab all risks, benefits, and alternatives discussed    #B/L Lung opacities with possible PNA (Gram Negative)  - Cefepime 1g q8h  - F/U MRSA nasal swab, procal, uStrep, uLegionella, BCx/UCx/UA, Sputum Cx  - ID to follow up     #NSCLC w/Liver metastasis  - Heme/Onc FU outpatient    #HTN/HLD  - c/w home meds  - DASH diet    #DM2  - Hold Metformin, resume on discharge  - FS AC TID w/ss coverage, consider basal/bolus coverage if persistently hyperglycemic  - CHO diet    #Anemia  Probably due to chemo  -S/P 1 unit of RBC transfusion.  H/H improved      #Progress Note Handoff  Pending (specify): cultures, and procalcitonin   Family discussion:  plan of care was discussed with patient  in details.  all questions were answered.  seems to understand, and in agreement  Disposition: Home with service, refuses rehab

## 2022-08-30 NOTE — PROGRESS NOTE ADULT - ASSESSMENT
Patient is a 77y old  Male with PMHx of NSCLC Ca Stage 3 on Chemo (last session 7/21/22), HTN/HLD, DM2 presents to ER for evaluation  of fall.  Patient reports when he got up from a seated position, felt dizzy, hit his leg on the table and fell onto his face.  He reports his son heard him fall and he has brought in to the ER for eval.  Patient denies LOC or near syncope.  He Reports he has stopped chemo 2/2 worsening weakness and SOB since his last session 7/21/22.  He reports his SOB is at baseline over the past month.  He endorses intermittent cough with white sputum production  No fever chills.  On admission, he found to have no fever but leukocytosis. The CT chest finding are consistent with Aspiration pneumonia. He has  started On Cefepime and IV vancomycin, and the ID consult requested to assist with further evalution and antibiotic management.    # Aspiration pneumonia  -  MRSA nares negative   # Leukocytosis  # S/p Fall  # NSCLC Ca Stage 3 on Chemo (last session 7/21/22),    Recommendations  - stop vancomycin and flagyl  - continue cefepime 1g q 8 hours  - when planned for discharge, switch to PO vantin 200 mg BID to complete 7 days (end date 9/3)    Please call or message on [employer] Teams if with any questions.  Spectra [phone]

## 2022-08-31 NOTE — DISCHARGE NOTE PROVIDER - NSDCFUSCHEDAPPT_GEN_ALL_CORE_FT
[facility]  [facility] [address]   Scheduled Appointment: 09/01/2022

## 2022-08-31 NOTE — DISCHARGE NOTE PROVIDER - NSDCCPCAREPLAN_GEN_ALL_CORE_FT
PRINCIPAL DISCHARGE DIAGNOSIS  Diagnosis: Near-syncope or syncope  Assessment and Plan of Treatment: This was likely due to vertigo and orthostatic hypotension, which is when your blood pressure drops too quickly when you stand up. You were given meclizine and some fluids, and you should follow up with primary care doctor.      SECONDARY DISCHARGE DIAGNOSES  Diagnosis: Pneumonia  Assessment and Plan of Treatment: You were found to have pneumonia, which improved with antibiotics.    Diagnosis: Anemia in neoplastic disease  Assessment and Plan of Treatment: You had low blood levels, likely from chemo. No evidence of bleeding. Got a unit of blood, and hemoglobin improved. Follow up with primary care doctor.

## 2022-08-31 NOTE — DISCHARGE NOTE PROVIDER - NSDCMRMEDTOKEN_GEN_ALL_CORE_FT
amoxicillin-clavulanate 875 mg-125 mg oral tablet: 1 tab(s) orally 2 times a day (end 6/17)  Aspir 81 oral delayed release tablet: 1 tab(s) orally once a day   atorvastatin 40 mg oral tablet: 1 tab(s) orally once a day  dronabinol 2.5 mg oral capsule: 1 cap(s) orally 2 times a day  folic acid 1 mg oral tablet: 1 tab(s) orally once a day  lisinopril 5 mg oral tablet: 1 tab(s) orally once a day  metFORMIN 500 mg oral tablet: 1 tab(s) orally 2 times a day  METOPROLOL SUCCINATE ER  25 MG TB24: 1 tab(s) orally once a day  prochlorperazine 10 mg oral tablet: 1 tab(s) orally every 6 hours  Zofran 8 mg oral tablet: 1 tab(s) orally 3 times a day   Aspir 81 oral delayed release tablet: 1 tab(s) orally once a day   atorvastatin 40 mg oral tablet: 1 tab(s) orally once a day  cefpodoxime 200 mg oral tablet: 1 tab(s) orally 2 times a day   dronabinol 2.5 mg oral capsule: 1 cap(s) orally 2 times a day  folic acid 1 mg oral tablet: 1 tab(s) orally once a day  lisinopril 5 mg oral tablet: 1 tab(s) orally once a day  meclizine 25 mg oral tablet: 1 tab(s) orally every 8 hours, As needed, Dizziness  metFORMIN 500 mg oral tablet: 1 tab(s) orally 2 times a day  METOPROLOL SUCCINATE ER  25 MG TB24: 1 tab(s) orally once a day  Zofran 8 mg oral tablet: 1 tab(s) orally 3 times a day

## 2022-08-31 NOTE — DISCHARGE NOTE PROVIDER - CARE PROVIDER_API CALL
Ayush Collins (MD)  Medicine  [address] [place] [address]  Phone: [phone]  Fax: [phone]  Follow Up Time: 1 week

## 2022-08-31 NOTE — DISCHARGE NOTE NURSING/CASE MANAGEMENT/SOCIAL WORK - NSDCPEFALRISK_GEN_ALL_CORE
For information on Fall & Injury Prevention, visit: [email or web address] OR  [email or web address] OR  https://www.cdc.gov/steadi/patient.html

## 2022-08-31 NOTE — DISCHARGE NOTE PROVIDER - HOSPITAL COURSE
77M w/PMHx of NSCLC Ca Stage 3 on Chemo (last session 7/21/22), HTN/HLD, DM2 presents to ED with complaints of fall, found to have PNA and admitted to medicine service for further management.   Fall likely due to vertigo/orthostatic hypotension, improved with meclizine/ivf. Also found to have GN pneumonia, improved on abx. Will go home on po abx. Patient refused rehab, will be discharged with home care. FROM ADMISSION H+P:   HPI:  77M w/PMHx of NSCLC Ca Stage 3 on Chemo (last session 7/21/22), HTN/HLD, DM2 presents to ED with complaints of fall.  Patient reports he was at his baseline when today he got up from a seated position, felt dizzy, hit his leg on the table and fell onto his face.  He reports his son heard him fall and he was taken to the ED for eval.  Patient denies LOC or near syncope.  He Reports he has stopped chemo 2/2 worsening weakness and SOB since his last session 7/21/22.  He reports his SOB is at baseline over the past month.  He endorses intermittent cough with white sputum production  No fever chills.  No CP.  No abdominal complaints.   (28 Aug 2022 16:22)      ---  HOSPITAL COURSE:     Patient was medically optimized and improved clinically throughout hospital course. Patient  examined on day of discharge and found to be medically stable for discharge  by Dr. Ayers to home   with outpatient follow up.    #s/p fall w/vertigo  - Telemetry shows no arrythmia.  Head CT scan negative   - serial trop - unremarkable  - Last ECHO 6/22, reviewed- No need for repeat  - orthostatic +.  IVF and repeat orthostatic BP   - Meclizine 25mg PO TID PRN  - Neurology consulted, with no acute intervention warranted at time of consult, with rec for medical management implemented.     - Pt refused rehab all risks, benefits, and alternatives discussed    #B/L Lung opacities with possible PNA (Gram Negative)  - pt started on cefepime, vanc, and  flagyl, with ID recommendation to dc vanc & flagyl, and continue Cefepime 1g q8h      #NSCLC w/Liver metastasis  - Heme/Onc FU outpatient    #HTN/HLD  - c/w home meds  - DASH diet    #DM2  - Hold Metformin, resumed on discharge  - FS AC TID w/ss coverage,  - CHO diet    #Anemia  Probably due to chemo  -S/P 1 unit of RBC transfusion.  H/H improved            Vital Signs Last 24 Hrs  T(C): 36.7 (31 Aug 2022 05:10), Max: 36.9 (30 Aug 2022 14:16)  T(F): 98.1 (31 Aug 2022 05:10), Max: 98.4 (30 Aug 2022 14:16)  HR: 90 (31 Aug 2022 05:10) (86 - 90)  BP: 96/47 (31 Aug 2022 05:10) (96/47 - 108/55)  BP(mean): --  RR: 18 (30 Aug 2022 20:15) (18 - 18)  SpO2: 97% (31 Aug 2022 06:01) (97% - 97%)    Parameters below as of 31 Aug 2022 06:01  Patient On (Oxygen Delivery Method): room air        Please note this discharge summary is a brief recap of pt's current hospital course. Please refer to patient medical records if further details are needed and/or for any questions. FROM ADMISSION H+P:   HPI:  77M w/PMHx of NSCLC Ca Stage 3 on Chemo (last session 7/21/22), HTN/HLD, DM2 presents to ED with complaints of fall.  Patient reports he was at his baseline when today he got up from a seated position, felt dizzy, hit his leg on the table and fell onto his face.  He reports his son heard him fall and he was taken to the ED for eval.  Patient denies LOC or near syncope.  He Reports he has stopped chemo 2/2 worsening weakness and SOB since his last session 7/21/22.  He reports his SOB is at baseline over the past month.  He endorses intermittent cough with white sputum production  No fever chills.  No CP.  No abdominal complaints.   (28 Aug 2022 16:22)      ---  HOSPITAL COURSE:     Patient was medically optimized and improved clinically throughout hospital course. Patient  examined on day of discharge and found to be medically stable for discharge  by Dr. Ayers to home   with outpatient follow up.    #s/p fall w/vertigo  - Telemetry shows no arrythmia.  Head CT scan negative   - serial trop - unremarkable  - Last ECHO 6/22, reviewed- No need for repeat  - orthostatic +.  IVF and repeat orthostatic BP   - Meclizine 25mg PO TID PRN  - Neurology consulted, with no acute intervention warranted at time of consult, with rec for medical management implemented.     - Pt refused rehab all risks, benefits, and alternatives discussed    #B/L Lung opacities with possible PNA (Gram Negative)  - pt started on cefepime, vanc, and  flagyl, with ID recommendation to dc vanc & flagyl, and continue Cefepime 1g q8h  -d/c on vantin 200mg po bid til 9/3      #NSCLC w/Liver metastasis  - Heme/Onc FU outpatient    #HTN/HLD  - c/w home meds  - DASH diet    #DM2  - Hold Metformin, resumed on discharge  - FS AC TID w/ss coverage,  - CHO diet    #Anemia  Probably due to chemo  -S/P 1 unit of RBC transfusion.  H/H improved            Vital Signs Last 24 Hrs  T(C): 36.7 (31 Aug 2022 05:10), Max: 36.9 (30 Aug 2022 14:16)  T(F): 98.1 (31 Aug 2022 05:10), Max: 98.4 (30 Aug 2022 14:16)  HR: 90 (31 Aug 2022 05:10) (86 - 90)  BP: 96/47 (31 Aug 2022 05:10) (96/47 - 108/55)  BP(mean): --  RR: 18 (30 Aug 2022 20:15) (18 - 18)  SpO2: 97% (31 Aug 2022 06:01) (97% - 97%)    Parameters below as of 31 Aug 2022 06:01  Patient On (Oxygen Delivery Method): room air        Please note this discharge summary is a brief recap of pt's current hospital course. Please refer to patient medical records if further details are needed and/or for any questions.

## 2022-08-31 NOTE — DISCHARGE NOTE PROVIDER - ATTENDING DISCHARGE PHYSICAL EXAMINATION:
PHYSICAL EXAM-  GENERAL: NAD,    HEAD:  Atraumatic, Normocephalic  EYES: EOMI, PERRLA, conjunctiva and sclera clear  NECK: Supple, No JVD, Normal thyroid  NERVOUS SYSTEM:  Alert & Oriented X3, Moving all extremities  CHEST/LUNG: Clear to percussion bilaterally; No rales, rhonchi, wheezing, or rubs  HEART: Regular rate and rhythm; No murmurs, rubs, or gallops  ABDOMEN: Soft, Nontender, Nondistended; Bowel sounds present  EXTREMITIES:  No clubbing, cyanosis, or edema  SKIN: No rashes or lesions    Vital Signs Last 24 Hrs  T(C): 36.7 (31 Aug 2022 05:10), Max: 36.9 (30 Aug 2022 14:16)  T(F): 98.1 (31 Aug 2022 05:10), Max: 98.4 (30 Aug 2022 14:16)  HR: 90 (31 Aug 2022 05:10) (86 - 90)  BP: 96/47 (31 Aug 2022 05:10) (96/47 - 108/55)  BP(mean): --  RR: 18 (30 Aug 2022 20:15) (18 - 18)  SpO2: 97% (31 Aug 2022 06:01) (97% - 97%)    Parameters below as of 31 Aug 2022 06:01  Patient On (Oxygen Delivery Method): room air     Patient seen and examined at bedside. No acute events overnight. No current complaints. Says he is able to walk, no problems going to bathroom, is breathing better, and doesn't feel dizzy anymore. Stable for d/c.     PHYSICAL EXAM-  GENERAL: NAD,    HEAD:  Atraumatic, Normocephalic  EYES: EOMI, PERRLA, conjunctiva and sclera clear  NECK: Supple, No JVD, Normal thyroid  NERVOUS SYSTEM:  Alert & Oriented X3, Moving all extremities  CHEST/LUNG: Clear to percussion bilaterally; No rales, rhonchi, wheezing, or rubs  HEART: Regular rate and rhythm; No murmurs, rubs, or gallops  ABDOMEN: Soft, Nontender, Nondistended; Bowel sounds present  EXTREMITIES:  No clubbing, cyanosis, or edema  SKIN: No rashes or lesions    Vital Signs Last 24 Hrs  T(C): 36.7 (31 Aug 2022 05:10), Max: 36.9 (30 Aug 2022 14:16)  T(F): 98.1 (31 Aug 2022 05:10), Max: 98.4 (30 Aug 2022 14:16)  HR: 90 (31 Aug 2022 05:10) (86 - 90)  BP: 96/47 (31 Aug 2022 05:10) (96/47 - 108/55)  BP(mean): --  RR: 18 (30 Aug 2022 20:15) (18 - 18)  SpO2: 97% (31 Aug 2022 06:01) (97% - 97%)    Parameters below as of 31 Aug 2022 06:01  Patient On (Oxygen Delivery Method): room air

## 2022-08-31 NOTE — DISCHARGE NOTE NURSING/CASE MANAGEMENT/SOCIAL WORK - PATIENT PORTAL LINK FT
You can access the FollowMyHealth Patient Portal offered by [facility] by registering at the following website: [email or web address]. By joining [employer]’s FollowMyHealth portal, you will also be able to view your health information using other applications (apps) compatible with our system.

## 2022-09-17 PROBLEM — E11.9 TYPE 2 DIABETES MELLITUS WITHOUT COMPLICATIONS: Chronic | Status: ACTIVE | Noted: 2022-01-01

## 2022-09-17 PROBLEM — C34.90 MALIGNANT NEOPLASM OF UNSPECIFIED PART OF UNSPECIFIED BRONCHUS OR LUNG: Chronic | Status: ACTIVE | Noted: 2022-01-01

## 2022-09-20 NOTE — HISTORY OF PRESENT ILLNESS
[de-identified] : \par Mr. Vasquez is a 74 y/o M, current smoker, PMH of DM, HTN, HLD and AS, + smoker presenting for initial evaluation today for newly diagnosed NSCLC, adenocarcinoma. He presents with his son Primo.\par \par His history dates back to late April 2021 when he presented to the ED for shortness of breath after having a positive stress test as an outpatient. He reported that he could walk less than 1 block before becoming SOB which has progressively worsened. He denies any palpitations or chest pain/dizziness/syncope. He underwent cardiac catheterization and was found to have multivessel disease. He was seen by CT surgery while inpatient for AVR/CABG. Preop CT Chest showed a lobulated solid 2.7 cm RUL nodule, and a 5 mm ANIA nodule, and a 4.0 x 2.9 cm right paratracheal soft tissue lesion. S/P Bronch/EBUS 4/30/2021- Prelim Bronch/EBUS path showed carcinoma. Brain MRI negative for metastatic disease. He was discharged with outpatient follow up with oncology and cardiology. \par \par He was subsequently discharged and underwent a PET/CT as outpatient which showed FDG avid right upper lobe nodule, SUV max 23.7 compatible with biologic tumor activity. Ipsilateral FDG avid lymphadenopathy up to 17.0 compatible with hetal metastases.\par \par Final pathology shows Non-small cell carcinoma, favor adenocarcinoma. Immunohistochemical stains are performed and show the tumor is\par positive for CK7, negative for CK20, TTF-1, napsin, CDX2, PAX8; findings are compatible with primary lung adenocarcinoma\par \par  [de-identified] : terval History: 5/19/2021: Today, he feels well. He denies any chest pain, coughing, wheezing, or abdominal pain. He is still actively smoking. He finds it very difficult to stop. He smokes about 1 PPD for at least 60 years. \par 6/24/21:\par  Mr. CASTANEDA came for a follow up visit for NSCLC, lung adenocarcinoma. He completed 15 RT treatment and he is due for the 3rd cycle of Chemo. He reports feels well. He denies any chest pain, coughing, wheezing, abdominal pain. dysphagia. Patient tolerating diet well. \par CBC , CMP reviewed with acceptable results. \par  He is still actively smoking. He finds it very difficult to stop. He smokes about 1 PPD for at least 60 years.\par 7/15/21;\par  Mr. CASTANEDA came for a follow up visit for NSCLC, lung adenocarcinoma. He completed 22 RT treatment and he is due for the 6th cycle of Carboplatin and taxol. He reports feels well. But he reports SOB on exertion and occasionally coughing. He is still actively smoking about 15 cigarette per day. \par  He denies any chest pain, abdominal pain. dysphagia. Patient tolerating diet well. \par CBC , CMP reviewed with acceptable results. Pulse Ox is 98% on RA.\par  He finds it very difficult to stop. He smokes about 1 PPD for at least 60 years.\par We explain to monitor for Sever coughing or CP. he needs to report to ER in case if any worsen symptoms. \par \par 08/12/2021 patient returns after completion of chemoradiation for stage 3 NSCLC , he complains of weakness, cough , decreased appetite and abnormal taste . he has dyspnea on mild exertion . he is receiving IV magnesium for low mg . He lost few lbs so far. Hgb is down to 9.7 from 12 in one month , no hematochezia. \par \par 08/26/2021 Patient returns for follow up with more weight loss . His breathing is stable , no cough . no fever . \par \par 1/6/2022 Patient returns for follow p , he feels well , he is gaining weight , he denies change in cough or breathing . As per his son , he was never given prescription for PET scan . \par \par 2/10/2022 Patient returns for second dose of durvalmab , he complains of LLQ pain , constipation , also lower back pain radiating to the LLE with activity . no hematochezia , weakness. Still awaiting Ct scan ( PET was denied ) \par \par 3/10/2022 Patient returns for follow p , CT scan shows a new ill defined hepatic lesion suspicious for metastasis . lung mass and adenopathy has diminished . \par He feels well and denies abdominal pain , change in breathing or cough . \par \par 4/26/2022 Patient returns for follow up , PET scan shows extensive recurrence involving abdominopelvic LNs , bone ( right iliac wing , T2 , C4 without CT correlate ) , liver lesion measuring 5.8 X 5.2 cm . \par Marked decrease in RUL nodule , hilar and paratracheal adenopathy s/p radiation .\par He remains totally asymptomatic and denies any pain, weight loss, change in cough or breathing . \par \par 5/26/2022 Patient returns after Alimta X1 , he developed rash , stomatitis for several days . As per his son , he did not take decadron as recommended. He denies weight loss or diarrhea. \par \par 8/11/2022 Patient returns for follow up with increased weakness, exertional dyspnea , weight loss. , he was treated recently for exacerbated COPD and suspected  pneumonia , CT chest showed new RLL opacity . In addition he complains of urinary frequency and dysuria . \par CT abdomen showed increase in liver metastasis . he denies any pain . \par \par 9/19/22: Patient returns for followup of recurrent lung adenocarcinoma. He was recently admitted again for a fall, imaging on workup showed further progression of his disease, especially in the liver. He received PRBCs in the hospital. He was also diagnosed with pneumonia. He is currently on levaquin. He is feeling slightly better and able to walk from the parking lot to the office, however he still says his legs feel weak, he is often dizzy and has a poor appetite. He continues to lose weight.  Patient stated he would like to take a few months off from chemo before restarting treatment. We explained that he has already been off treatment for over a month and that he would need to resume treatment shortly if he wants to pursue therapy, however hospice is also a very reasonable option. He will discuss with his son and they will let us know his decision.

## 2022-09-20 NOTE — ASSESSMENT
[FreeTextEntry1] : 76 year old male with NSCLC stage 3 PDL1 :0 , no actionable alterations. s/p chemoradiation and  adjuvant immunotherapy . \par Low back pain . radiculopathy ?\par \par Extensive recurrence involving skeleton , liver and abdominopelvic lymph nodes .\par Progressive disease on Alimta ,\par weakness, weight loss . S/P COPD exacerbation +/- pneumonia . \par Anemia s/p transfusion , no symptomatic improvement . \par Lower  urinary tract symptoms . \par \par Recently admitted for fall, imaging showed further progression, diagnosed with aspiration pneumonia currently on PO levaquin\par \par Plan:\par Discussed with patient and his son that we can try to resume chemotherapy with a dose reduced regimen, likely taxotere 2 on 1 off vs gemcitabine, however we would need to start treatment shortly as a further delay will likely result in more of a decline in his performance status. He did not tolerate alimta well and patient is hesitant to try more chemotherapy. We discussed that palliative care is also a very reasonable option. His son will call with their decision.  \par

## 2022-09-20 NOTE — PHYSICAL EXAM
[Normal] : no peripheral adenopathy appreciated [Ambulatory and capable of all self care but unable to carry out any work activities] : Status 2- Ambulatory and capable of all self care but unable to carry out any work activities. Up and about more than 50% of waking hours [de-identified] :  no acute distress.  [de-identified] : brian cardenas .  [de-identified] : no edema.  [de-identified] : soft , no RUQ tenderness. .

## 2022-09-20 NOTE — REVIEW OF SYSTEMS
[Fatigue] : fatigue [Recent Change In Weight] : ~T recent weight change [Shortness Of Breath] : shortness of breath [Dizziness] : dizziness [Negative] : Cardiovascular [FreeTextEntry7] : decreased appetite

## 2022-12-19 NOTE — ED PROVIDER NOTE - PROGRESS NOTE DETAILS
SG: pt son provided collateral that pt suffering from significant neck pain and had multiple episodes of vomitus over the weekend. .Plan for further imaging. Will continue to monitor and reassess. SG: consulted Dr. Rock, Oncology on call, who advised no urgent Oncology intervention now bc of multiple liver mets makes stent placement less likely, but pt should be admitted for pain control/palliative with follow-up with Dr. Avila. Will continue to monitor and reassess.

## 2022-12-19 NOTE — ED PROVIDER NOTE - CARE PLAN
1 Principal Discharge DX:	Metastatic primary lung cancer  Secondary Diagnosis:	Constipation  Secondary Diagnosis:	Neck pain  Secondary Diagnosis:	Elevated white blood cell count

## 2022-12-19 NOTE — ED PROVIDER NOTE - NS ED ROS FT
Constitutional:  No fever, chills, lethargy, or abnormal weight loss  Eyes:  No eye pain or visual changes  ENMT: No nasal discharge, no toothache, no sore throat. No neck pain or stiffness  Cardiac:  No chest pain or palpitations  Respiratory:  No cough or respiratory distress.   GI:  see HPI.  :  No dysuria, frequency or burning.  MS:  see HPI  Neuro:  No headache. No numbness, weakness, or tingling.   Skin:  No skin rash  Except as documented in the HPI,  all other systems are negative

## 2022-12-19 NOTE — H&P ADULT - HISTORY OF PRESENT ILLNESS
77 year old patient known to have, NSCLC Ca Stage 3 not on Chemo, HTN, HLD, DM and CKD [id number], presented to ED for abdominal pain of 10 days duration.   Patient reports that he has been having left sided abdominal pain, intermittent and sharp in nature associated with constipation. His PCP prescribed him Miralax, Loratidine and steroids with no improvement in his symptoms. No nausea, no vomiting and tolerating PO intake.   He denies fever, chills, URT or urinary symptoms.     In ED, vitals significant for   Laboratory workup significant for Cr/BUN 1.5/37 eGFR 48 AST/ALT 76/65   pro BNP 2708  CT head: No CT evidence of acute intracranial pathology. Stable exam.  CT cervical spine: No evidence of acute cervical spine fracture or subluxation. Sclerotic lesions within the C3 and C6 vertebral bodies, not   significantly changed and likely reflecting metastatic disease given history and PET/CT findings of 4/18/2022.  CT A/P: Since July 31, 2022, new increased bilobar hepatic metastases. Worsening upper abdominal retroperitoneal metastatic disease.  Chest X-ray: Unchanged right lung opacities.  Patient follows with Dr. Avila; ED called Dr. Cross, on call, who advised no urgent Oncology intervention. Admitted for pain control and palliative care.      77 year old patient known to have, NSCLC Ca Stage 3 not on Chemo, HTN, HLD, DM and CKD [id number], presented to ED for abdominal pain of 10 days duration.   Patient reports that he has been having B/L lower abdominal pain, intermittent and sharp associated with constipation. His PCP prescribed him Famotidine, Dexamethasone 1 mg daily and Lactulose with no improvement in his symptoms. No nausea, no vomiting and tolerating PO intake (no change from baseline which is decreased in appetite). He also complaints of dizziness, neck pain and SOB upon exertion.   He denies fever, chills, URT or urinary symptoms.     In ED, vitals significant for   Laboratory workup significant for:  WBC 17K Hb 10.3 MCV 89.9   K 5.1  (hemolyzed)  Cr/BUN 1.5/37 eGFR 48 AST/ALT 76/65   pro BNP 2708  CT head: No CT evidence of acute intracranial pathology. Stable exam.  CT cervical spine: No evidence of acute cervical spine fracture or subluxation. Sclerotic lesions within the C3 and C6 vertebral bodies, not   significantly changed and likely reflecting metastatic disease given history and PET/CT findings of 4/18/2022.  CT A/P: Since July 31, 2022, new increased bilobar hepatic metastases. Worsening upper abdominal retroperitoneal metastatic disease.  Chest X-ray: Unchanged right lung opacities.  Patient was given fleet enema which helped him pass BM since 10 days.   Patient follows with Dr. Avila; ED called Dr. Cross, on call, who advised no urgent Oncology intervention. Admitted for pain control and palliative care.

## 2022-12-19 NOTE — ED PROVIDER NOTE - CLINICAL SUMMARY MEDICAL DECISION MAKING FREE TEXT BOX
pt presented with constipation and neck pain. pt found to have worsening metastatic cancer. Spoke with covering oncologist, admitted for pain management and further eval of CT findings.

## 2022-12-19 NOTE — H&P ADULT - NSHPPHYSICALEXAM_GEN_ALL_CORE
T(C): 37.3 (12-19-22 @ 14:17), Max: 37.3 (12-19-22 @ 10:17)  HR: 109 (12-19-22 @ 10:17) (109 - 109)  BP: 116/56 (12-19-22 @ 10:17) (116/56 - 116/56)  RR: 17 (12-19-22 @ 10:17) (17 - 17)  SpO2: 99% (12-19-22 @ 10:17) (99% - 99%)    CONSTITUTIONAL: Well groomed, no apparent distress  RESP: No respiratory distress, no use of accessory muscles; CTA b/l, no WRR  CV: RRR, +S1S2, no MRG; no JVD; no peripheral edema  GI: Soft, NT, ND, no rebound, no guarding; no palpable masses  NEURO: AAOx3; no focal neurologic deficits T(C): 37.3 (12-19-22 @ 14:17), Max: 37.3 (12-19-22 @ 10:17)  HR: 109 (12-19-22 @ 10:17) (109 - 109)  BP: 116/56 (12-19-22 @ 10:17) (116/56 - 116/56)  RR: 17 (12-19-22 @ 10:17) (17 - 17)  SpO2: 99% (12-19-22 @ 10:17) (99% - 99%)    CONSTITUTIONAL: Well groomed, no apparent distress  RESP: No respiratory distress, no use of accessory muscles; decreased breath sounds bilaterally  CV: RRR, +S1S2; no peripheral edema  GI: Soft, NT, ND, no rebound, no guarding; no palpable masses  NEURO: AAOx3; no focal neurologic deficits

## 2022-12-19 NOTE — H&P ADULT - NSHPLABSRESULTS_GEN_ALL_CORE
10.3   17.28 )-----------( 206      ( 19 Dec 2022 11:36 )             32.2       12-19    131<L>  |  94<L>  |  37<H>  ----------------------------<  129<H>  5.1<H>   |  22  |  1.5    Ca    10.0      19 Dec 2022 11:36    TPro  7.9  /  Alb  4.3  /  TBili  0.4  /  DBili  <0.2  /  AST  72<H>  /  ALT  65<H>  /  AlkPhos  924<H>  12-19      < from: CT Abdomen and Pelvis w/ IV Cont (12.19.22 @ 14:59) >    1.  Since July 31, 2022, new increased bilobar hepatic metastases.  2.  Worsening upper abdominal retroperitoneal metastatic disease.    < end of copied text >

## 2022-12-19 NOTE — ED ADULT NURSE NOTE - NSIMPLEMENTINTERV_GEN_ALL_ED
Implemented All Fall with Harm Risk Interventions:  [place] to call system. Call bell, personal items and telephone within reach. Instruct patient to call for assistance. Room bathroom lighting operational. Non-slip footwear when patient is off stretcher. Physically safe environment: no spills, clutter or unnecessary equipment. Stretcher in lowest position, wheels locked, appropriate side rails in place. Provide visual cue, wrist band, yellow gown, etc. Monitor gait and stability. Monitor for mental status changes and reorient to person, place, and time. Review medications for side effects contributing to fall risk. Reinforce activity limits and safety measures with patient and family. Provide visual clues: red socks.

## 2022-12-19 NOTE — ED PROVIDER NOTE - OBJECTIVE STATEMENT
Elderly gentleman with past medical history of lung cancer not on chemo heading to palliative, HTN, HLD, DM coming in with complaints of constipation.  Constipation has been going on for the past 10 days, only started feeling discomfort the last 4 days, associated with left-sided pleuritic chest pain, squeezing in nature, and shortness of breath.  Was recently at his PCP for body itching, PCP prescribed him loratadine, MiraLAX, and steroids but patient was noncompliant with the medicine. Denies any fever, chills, nausea, vomiting, CP, SOB, changes in urination.

## 2022-12-19 NOTE — ED PROVIDER NOTE - PHYSICAL EXAMINATION
Gen: Alert, NAD, well appearing  Head: NC, AT, EOMI, normal lids/conjunctiva,  PERRL,  ENT: normal hearing, patent oropharynx without erythema/exudate, uvula midline  Pulm: Bilateral BS, R>L, normal resp effort, no wheeze/stridor/retractions  CV: RRR, no M/R/G, +dist pulses, tender at left anterior chest wall  Abd: soft, tender at upper quadrants, Rectal performed with PCA Angle with had stool in vault  Mskel: no edema/erythema/cyanosis  Skin: no rash, warm/dry  Neuro: AAOx3, no sensory/motor deficits, CN 2-12 grossly intact

## 2022-12-19 NOTE — H&P ADULT - ASSESSMENT
77 year old patient known to have, NSCLC Ca Stage 3 not on Chemo, HTN, HLD, DM and CKD [id number], presented to ED for abdominal pain associated with constipation of 10 days duration.   Admitted for pain control and palliative care.     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 Avila   - No nausea, no vomiting and tolerating PO intake.   - In ED, vitals significant for   - Laboratory workup significant  AST/ALT 76/65   - CT head: No CT evidence of acute intracranial pathology. Stable exam.  - CT cervical spine: No evidence of acute cervical spine fracture or subluxation. Sclerotic lesions within the C3 and C6 vertebral bodies, not   significantly changed and likely reflecting metastatic disease given history and PET/CT findings of 4/18/2022.  - CT A/P: Since July 31, 2022, new increased bilobar hepatic metastases. Worsening upper abdominal retroperitoneal metastatic disease.  - Chest X-ray: Unchanged right lung opacities.  - ED called Dr. Cross, on call, who advised no urgent Oncology intervention  -     #CKD stage 3a  - Cr/BUN 1.5/37 eGFR 48 77 year old patient known to have, NSCLC Ca Stage 3 not on Chemo, HTN, HLD, DM and CKD [id number], presented to ED for abdominal pain associated with constipation of 10 days duration.   Admitted for pain control and palliative care.     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 Avila   - No nausea, no vomiting and tolerating PO intake.   - In ED, vitals significant for   - Laboratory workup significant  AST/ALT 76/65   - CT head: No CT evidence of acute intracranial pathology. Stable exam.  - CT cervical spine: No evidence of acute cervical spine fracture or subluxation. Sclerotic lesions within the C3 and C6 vertebral bodies, not   significantly changed and likely reflecting metastatic disease given history and PET/CT findings of 4/18/2022.  - CT A/P: Since July 31, 2022, new increased bilobar hepatic metastases. Worsening upper abdominal retroperitoneal metastatic disease.  - Chest X-ray: Unchanged right lung opacities.  - ED called Dr. Cross, on call, who advised no urgent Oncology intervention  - Start Miralax BID and Senna 2 tabs at bedtime; if no passing of BM start lactulose then enemas  - Palliative care consult   - Oncology consult  - Pain control PRN   - c/w Singulair; stopped Dexamethasone (took it for 3 days)    #Leukocytosis likely secondary to steroid use   - WBC 17K   - ni signs of infection     #Anemia likely anemia of chronic disease   - Hb 10.3 MCV 89.9  - Follow up iron studies   - Monitor CBC  - Keep active type and screen    #Hyponatremia  #Hyperkalemia  -  K 5.1  (hemolyzed)  - s/p NS bolus   - Monitor CMP     #HTN  - c/w Lisinopril    #HLD  - Lipid profile in am   - c/w statin     #DM  - a1c in am  - Monitor FS  - ISS; calculate 24 hour needs of insulin and adjust accordingly pre meals and basal insulin     #CKD stage 3a  - Cr/BUN 1.5/37 eGFR 48    Activity: IAT  Diet: DASH/TLC; carb consistent   DVT ppx: Heparin  GI ppx: none

## 2022-12-19 NOTE — ED PROVIDER NOTE - ATTENDING CONTRIBUTION TO CARE
77 n with past medical history of lung cancer not on chemo heading to palliative, HTN, HLD, DM presents for eval of constipation. Pt also reports vomitting and neck pain. agree with above hx and exam

## 2022-12-20 NOTE — DISCHARGE NOTE PROVIDER - NSDCCPCAREPLAN_GEN_ALL_CORE_FT
PRINCIPAL DISCHARGE DIAGNOSIS  Diagnosis: Constipation  Assessment and Plan of Treatment: Constipation is when a person has fewer than three bowel movements a week, has difficulty having a bowel movement, or has stools that are dry, hard, or larger than normal. Other symptoms can include abdominal pain or bloating. As people grow older, constipation is more common. A low-fiber diet, not taking in enough fluids, and taking certain medicines, including opioid painkillers, may make constipation worse. Treatment varies but may include dietary modifications (more fiber-rich foods), lifestyle modifications, and possible medications.   SEEK IMMEDIATE MEDICAL CARE IF YOU HAVE ANY OF THE FOLLOWING SYMPTOMS: bright red blood in your stool, constipation for longer than 4 days, abdominal or rectal pain, unexplained weight loss, or inability to pass gas.

## 2022-12-20 NOTE — DISCHARGE NOTE PROVIDER - HOSPITAL COURSE
77 year old patient known to have, NSCLC Ca Stage 3 not on Chemo, HTN, HLD, DM and CKD [id number], presented to ED for abdominal pain of 10 days duration.     Patient reports that he has been having B/L lower abdominal pain, intermittent and sharp associated with constipation. His PCP prescribed him Famotidine, Dexamethasone 1 mg daily and Lactulose with no improvement in his symptoms. No nausea, no vomiting and tolerating PO intake (no change from baseline which is decreased in appetite). He also complaints of dizziness, neck pain and SOB upon exertion.   He denies fever, chills, URI or urinary symptoms.     After admission, patient had bowel movements x2 with resolution of abdominal pain. Stable for discharge with outpatient followup.    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 Avila   - No nausea, no vomiting and tolerating PO intake.   - In ED, vitals significant for   - Laboratory workup significant  AST/ALT 76/65   - CT head: No CT evidence of acute intracranial pathology. Stable exam.  - CT cervical spine: No evidence of acute cervical spine fracture or subluxation. Sclerotic lesions within the C3 and C6 vertebral bodies, not   significantly changed and likely reflecting metastatic disease given history and PET/CT findings of 4/18/2022.  - CT A/P: Since July 31, 2022, new increased bilobar hepatic metastases. Worsening upper abdominal retroperitoneal metastatic disease.  - Chest X-ray: Unchanged right lung opacities.  - ED called Dr. Cross, on call, who advised no urgent Oncology intervention  - Start Miralax BID and Senna 2 tabs at bedtime; if no passing of BM start lactulose then enemas  - Palliative care consult   - Oncology consult  - Pain control PRN   - c/w Singulair; stopped Dexamethasone (took it for 3 days)    #Leukocytosis likely secondary to steroid use   - WBC 17K   - no signs of infection     #Anemia likely anemia of chronic disease   - Hb 10.3 MCV 89.9  - Follow up iron studies   - Monitor CBC  - Keep active type and screen    #Hyponatremia  #Hyperkalemia  -  K 5.1  (hemolyzed)  - s/p NS bolus   - Monitor CMP     #HTN  - c/w Lisinopril    #HLD  - Lipid profile in am   - c/w statin     #DM  - a1c in am  - Monitor FS  - ISS; calculate 24 hour needs of insulin and adjust accordingly pre meals and basal insulin     #CKD stage 3a  - Cr/BUN 1.5/37 eGFR 48   77 year old patient known to have, NSCLC Ca Stage 3 not on Chemo, HTN, HLD, DM and CKD [id number], presented to ED for abdominal pain of 10 days duration.     Patient reports that he has been having B/L lower abdominal pain, intermittent and sharp associated with constipation. His PCP prescribed him Famotidine, Dexamethasone 1 mg daily and Lactulose with no improvement in his symptoms. No nausea, no vomiting and tolerating PO intake (no change from baseline which is decreased in appetite). He also complaints of dizziness, neck pain and SOB upon exertion.   He denies fever, chills, URI or urinary symptoms.     After admission, patient had bowel movements x2 with resolution of abdominal pain. Stable for discharge with outpatient followup.    1. Abdominal pain associated with  constipation resolved. Hx of NSCLC stage 3 with mets to liver and bone  - not on Chemo; follows with Dr. Avila   - No nausea, no vomiting and tolerating PO intake.   - In ED, vitals significant for   - Laboratory workup significant  AST/ALT 76/65   - CT head: No CT evidence of acute intracranial pathology. Stable exam.  - CT cervical spine: No evidence of acute cervical spine fracture or subluxation. Sclerotic lesions within the C3 and C6 vertebral bodies, not   significantly changed and likely reflecting metastatic disease given history and PET/CT findings of 4/18/2022.  - CT A/P: Since July 31, 2022, new increased bilobar hepatic metastases. Worsening upper abdominal retroperitoneal metastatic disease.  - Chest X-ray: Unchanged right lung opacities.  - ED called Dr. Cross, on call, who advised no urgent Oncology intervention  - Palliative care consult   - DC on laxative  - Oncology consult  - Pain control PRN   - c/w Singulair    2. Leukocytosis likely secondary to steroid use   - WBC 17K   - no signs of infection    stopped Dexamethasone (took it for 3 days)    3. Anemia likely anemia of chronic disease   - Hb 10.3 MCV 89.9    4. HTN  - c/w Lisinopril    5. HLD  - c/w statin     6. DM  - a1c:pending   - Monitor FS  - Cont home meds     7. CKD stage 3a  - Cr/BUN 1.5/37 eGFR 48    8. Chronic cough and wheezing due to NSCLC  - No change as per patient    Patient feels well. he had multiple bm after laxative. he's not interested in getting chemo despite worsening mets. he wants to discuss it with his oncologist  as outpatient. heme onc ok with dc.   he has chronic cough. reported it's stable    he will need to fu with PMD for regular check up     he will need to fu with Oncology for metastatic cancer

## 2022-12-20 NOTE — DISCHARGE NOTE PROVIDER - ATTENDING DISCHARGE PHYSICAL EXAMINATION:
VITALS:   T(C): 36.7 (12-20-22 @ 00:20), Max: 37.3 (12-19-22 @ 14:17)  HR: 104 (12-20-22 @ 06:22) (104 - 108)  BP: 107/51 (12-20-22 @ 06:22) (107/51 - 117/58)  RR: 18 (12-20-22 @ 00:20) (18 - 18)  SpO2: 97% (12-20-22 @ 00:20) (97% - 97%)    GENERAL: NAD, lying in bed comfortably  Heart: S1,S2  LUNGS: Wheezing noted   ABDOMEN: Soft, nontender, nondistended, +BS  EXTREMITIES: 2+ peripheral pulses bilaterally  NERVOUS SYSTEM:  A&Ox3,

## 2022-12-20 NOTE — DISCHARGE NOTE NURSING/CASE MANAGEMENT/SOCIAL WORK - PATIENT PORTAL LINK FT
You can access the FollowMyHealth Patient Portal offered by [facility] by registering at the following website: [email or web address]. By joining [employer]’s FollowMyHealth portal, you will also be able to view your health information using other applications (apps) compatible with our system.

## 2022-12-20 NOTE — DISCHARGE NOTE PROVIDER - NSDCMRMEDTOKEN_GEN_ALL_CORE_FT
Aspir 81 oral delayed release tablet: 1 tab(s) orally once a day   atorvastatin 40 mg oral tablet: 1 tab(s) orally once a day  cholestyramine 4 g/4.8 g oral powder for reconstitution: 1 application orally 3 times a day  dexamethasone 1 mg oral tablet: 1 tab(s) orally once a day  famotidine 20 mg oral tablet: 1 tab(s) orally 2 times a day  lisinopril 5 mg oral tablet: 1 tab(s) orally once a day  metFORMIN 500 mg oral tablet: 1 tab(s) orally 2 times a day  polyethylene glycol 3350 oral powder for reconstitution: 17 gram(s) orally 2 times a day  senna leaf extract oral tablet: 2 tab(s) orally once a day (at bedtime)  Singulair 10 mg oral tablet: 1 tab(s) orally once a day

## 2022-12-20 NOTE — CONSULT NOTE ADULT - ASSESSMENT
77 year old patient known to have, NSCLC Ca Stage 3 not on Chemo, HTN, HLD, DM and CKD [id number], presented to ED for abdominal pain associated with constipation of 10 days duration.   Admitted for pain control and palliative care.       Hematology and Oncology was consulted due to history of lung cancer and evidence of progression not on chemotherapy.    # NSCLC stage 3 PDL1 :0 , no actionable alterations.    - s/p chemoradiation and adjuvant immunotherapy.   -Extensive recurrence involving skeleton , liver and abdominopelvic lymph nodes.  -Progressive disease on Alimta.  -CTA w contrast: 12/22  Since July 31, 2022, new increased bilobar hepatic metastases. Worsening upper abdominal retroperitoneal metastatic disease.    Plan  -pt follows with Dr. Umana as out patient; Pt was last seen on 9/22.  -Plan was to start with taxotere 2 on 1 off vs gemcitabine, He did not tolerate alimta well and patient is hesitant to try more chemotherapy as out patient  -pt doesn't want to do any chemotherapy now. He wanted to be more comfortable and pain control.  -he will  follow with Dr. umana with his son in the clinic after discharge and will make a final decision.  palliative for pain management.  
77M with NSCLC stage III (s/p chemoradiation and adjuvant immunotherapy), HTN, HLD, DM, CKD3a, here with abdominal pain x 10 days, due to constipation, which improved after BM 12/19/22. Seen by heme/onc, and is considering no further treatment, and is due to f/u with Dr. Avila outpatient. Palliative called for pain management and GOC.

## 2022-12-20 NOTE — PATIENT PROFILE ADULT - FALL HARM RISK - HARM RISK INTERVENTIONS

## 2022-12-20 NOTE — CONSULT NOTE ADULT - PROBLEM SELECTOR RECOMMENDATION 5
- patient being d/c today  ______________  Lino Ordoñez MD  Palliative Medicine  [facility]   of Geriatric and Palliative Medicine  [phone]

## 2022-12-20 NOTE — DISCHARGE NOTE PROVIDER - CARE PROVIDER_API CALL
Ayush Collins (MD)  Medicine  [address] [place] [address]  Phone: [phone]  Fax: [phone]  Established Patient  Follow Up Time: 1 week

## 2022-12-20 NOTE — CONSULT NOTE ADULT - PROBLEM SELECTOR RECOMMENDATION 4
- introduced palliative care, patient concerned about being d/c today, as he is in the [facility]  - will f/u with Dr. Avila outpatient  - palliative contact info given  - d/w team

## 2022-12-20 NOTE — DISCHARGE NOTE NURSING/CASE MANAGEMENT/SOCIAL WORK - NSDCPEFALRISK_GEN_ALL_CORE
For information on Fall & Injury Prevention, visit: [email or web address] OR  [email or web address] OR  https://www.cdc.gov/steadi/patient.html

## 2022-12-20 NOTE — CONSULT NOTE ADULT - ATTENDING COMMENTS
seen/examined w/ hemonc fellow; note reviewed; case discussed: pt is clear that quality of life is more important to him than quantity of life: he is agreeable to consider a f/u with Dr Avila to discuss any appropriate options of therapy pending they would not impair his quality of life

## 2022-12-30 PROBLEM — C34.90 LUNG CANCER: Status: ACTIVE | Noted: 2021-05-04

## 2023-01-01 ENCOUNTER — INPATIENT (INPATIENT)
Facility: HOSPITAL | Age: 78
LOS: 6 days | End: 2023-01-16
Payer: MEDICARE

## 2023-01-01 ENCOUNTER — TRANSCRIPTION ENCOUNTER (OUTPATIENT)
Age: 78
End: 2023-01-01

## 2023-01-01 VITALS
RESPIRATION RATE: 18 BRPM | HEART RATE: 101 BPM | TEMPERATURE: 97 F | DIASTOLIC BLOOD PRESSURE: 51 MMHG | SYSTOLIC BLOOD PRESSURE: 94 MMHG

## 2023-01-01 VITALS
SYSTOLIC BLOOD PRESSURE: 108 MMHG | OXYGEN SATURATION: 100 % | HEART RATE: 98 BPM | DIASTOLIC BLOOD PRESSURE: 52 MMHG | RESPIRATION RATE: 18 BRPM | TEMPERATURE: 97 F

## 2023-01-01 DIAGNOSIS — Z51.5 ENCOUNTER FOR PALLIATIVE CARE: ICD-10-CM

## 2023-01-01 DIAGNOSIS — R52 PAIN, UNSPECIFIED: ICD-10-CM

## 2023-01-01 DIAGNOSIS — C34.90 MALIGNANT NEOPLASM OF UNSPECIFIED PART OF UNSPECIFIED BRONCHUS OR LUNG: ICD-10-CM

## 2023-01-01 DIAGNOSIS — R06.00 DYSPNEA, UNSPECIFIED: ICD-10-CM

## 2023-01-01 DIAGNOSIS — K59.00 CONSTIPATION, UNSPECIFIED: ICD-10-CM

## 2023-01-01 LAB
ALBUMIN SERPL ELPH-MCNC: 3.2 G/DL — LOW (ref 3.5–5.2)
ALBUMIN SERPL ELPH-MCNC: 3.4 G/DL — LOW (ref 3.5–5.2)
ALBUMIN SERPL ELPH-MCNC: 3.7 G/DL — SIGNIFICANT CHANGE UP (ref 3.5–5.2)
ALP SERPL-CCNC: 1382 U/L — HIGH (ref 30–115)
ALP SERPL-CCNC: 893 U/L — HIGH (ref 30–115)
ALP SERPL-CCNC: 994 U/L — HIGH (ref 30–115)
ALT FLD-CCNC: 32 U/L — SIGNIFICANT CHANGE UP (ref 0–41)
ALT FLD-CCNC: 33 U/L — SIGNIFICANT CHANGE UP (ref 0–41)
ALT FLD-CCNC: 40 U/L — SIGNIFICANT CHANGE UP (ref 0–41)
ANION GAP SERPL CALC-SCNC: 13 MMOL/L — SIGNIFICANT CHANGE UP (ref 7–14)
ANION GAP SERPL CALC-SCNC: 14 MMOL/L — SIGNIFICANT CHANGE UP (ref 7–14)
ANION GAP SERPL CALC-SCNC: 15 MMOL/L — HIGH (ref 7–14)
ANION GAP SERPL CALC-SCNC: 15 MMOL/L — HIGH (ref 7–14)
ANION GAP SERPL CALC-SCNC: 17 MMOL/L — HIGH (ref 7–14)
ANISOCYTOSIS BLD QL: SLIGHT — SIGNIFICANT CHANGE UP
APPEARANCE UR: CLEAR — SIGNIFICANT CHANGE UP
AST SERPL-CCNC: 65 U/L — HIGH (ref 0–41)
AST SERPL-CCNC: 71 U/L — HIGH (ref 0–41)
AST SERPL-CCNC: 83 U/L — HIGH (ref 0–41)
BACTERIA # UR AUTO: NEGATIVE — SIGNIFICANT CHANGE UP
BASOPHILS # BLD AUTO: 0 K/UL — SIGNIFICANT CHANGE UP (ref 0–0.2)
BASOPHILS # BLD AUTO: 0.06 K/UL — SIGNIFICANT CHANGE UP (ref 0–0.2)
BASOPHILS # BLD AUTO: 0.07 K/UL — SIGNIFICANT CHANGE UP (ref 0–0.2)
BASOPHILS NFR BLD AUTO: 0 % — SIGNIFICANT CHANGE UP (ref 0–1)
BASOPHILS NFR BLD AUTO: 0.4 % — SIGNIFICANT CHANGE UP (ref 0–1)
BASOPHILS NFR BLD AUTO: 0.5 % — SIGNIFICANT CHANGE UP (ref 0–1)
BILIRUB DIRECT SERPL-MCNC: 0.7 MG/DL — HIGH (ref 0–0.3)
BILIRUB INDIRECT FLD-MCNC: 0.4 MG/DL — SIGNIFICANT CHANGE UP (ref 0.2–1.2)
BILIRUB SERPL-MCNC: 0.6 MG/DL — SIGNIFICANT CHANGE UP (ref 0.2–1.2)
BILIRUB SERPL-MCNC: 0.8 MG/DL — SIGNIFICANT CHANGE UP (ref 0.2–1.2)
BILIRUB SERPL-MCNC: 1.1 MG/DL — SIGNIFICANT CHANGE UP (ref 0.2–1.2)
BILIRUB UR-MCNC: NEGATIVE — SIGNIFICANT CHANGE UP
BLD GP AB SCN SERPL QL: SIGNIFICANT CHANGE UP
BUN SERPL-MCNC: 32 MG/DL — HIGH (ref 10–20)
BUN SERPL-MCNC: 35 MG/DL — HIGH (ref 10–20)
BUN SERPL-MCNC: 43 MG/DL — HIGH (ref 10–20)
BUN SERPL-MCNC: 51 MG/DL — HIGH (ref 10–20)
BUN SERPL-MCNC: 54 MG/DL — HIGH (ref 10–20)
CALCIUM SERPL-MCNC: 8.5 MG/DL — SIGNIFICANT CHANGE UP (ref 8.4–10.5)
CALCIUM SERPL-MCNC: 8.6 MG/DL — SIGNIFICANT CHANGE UP (ref 8.4–10.5)
CALCIUM SERPL-MCNC: 8.7 MG/DL — SIGNIFICANT CHANGE UP (ref 8.4–10.5)
CALCIUM SERPL-MCNC: 8.9 MG/DL — SIGNIFICANT CHANGE UP (ref 8.4–10.4)
CALCIUM SERPL-MCNC: 9.6 MG/DL — SIGNIFICANT CHANGE UP (ref 8.4–10.5)
CHLORIDE SERPL-SCNC: 100 MMOL/L — SIGNIFICANT CHANGE UP (ref 98–110)
CHLORIDE SERPL-SCNC: 101 MMOL/L — SIGNIFICANT CHANGE UP (ref 98–110)
CHLORIDE SERPL-SCNC: 101 MMOL/L — SIGNIFICANT CHANGE UP (ref 98–110)
CHLORIDE SERPL-SCNC: 98 MMOL/L — SIGNIFICANT CHANGE UP (ref 98–110)
CHLORIDE SERPL-SCNC: 98 MMOL/L — SIGNIFICANT CHANGE UP (ref 98–110)
CO2 SERPL-SCNC: 17 MMOL/L — SIGNIFICANT CHANGE UP (ref 17–32)
CO2 SERPL-SCNC: 19 MMOL/L — SIGNIFICANT CHANGE UP (ref 17–32)
CO2 SERPL-SCNC: 20 MMOL/L — SIGNIFICANT CHANGE UP (ref 17–32)
CO2 SERPL-SCNC: 21 MMOL/L — SIGNIFICANT CHANGE UP (ref 17–32)
CO2 SERPL-SCNC: 22 MMOL/L — SIGNIFICANT CHANGE UP (ref 17–32)
COLOR SPEC: YELLOW — SIGNIFICANT CHANGE UP
CREAT SERPL-MCNC: 1.2 MG/DL — SIGNIFICANT CHANGE UP (ref 0.7–1.5)
CREAT SERPL-MCNC: 1.3 MG/DL — SIGNIFICANT CHANGE UP (ref 0.7–1.5)
CREAT SERPL-MCNC: 1.8 MG/DL — HIGH (ref 0.7–1.5)
CREAT SERPL-MCNC: 2.4 MG/DL — HIGH (ref 0.7–1.5)
CREAT SERPL-MCNC: 2.8 MG/DL — HIGH (ref 0.7–1.5)
CULTURE RESULTS: SIGNIFICANT CHANGE UP
DIFF PNL FLD: ABNORMAL
EGFR: 23 ML/MIN/1.73M2 — LOW
EGFR: 27 ML/MIN/1.73M2 — LOW
EGFR: 39 ML/MIN/1.73M2 — LOW
EGFR: 57 ML/MIN/1.73M2 — LOW
EGFR: 63 ML/MIN/1.73M2 — SIGNIFICANT CHANGE UP
EOSINOPHIL # BLD AUTO: 0.13 K/UL — SIGNIFICANT CHANGE UP (ref 0–0.7)
EOSINOPHIL # BLD AUTO: 0.35 K/UL — SIGNIFICANT CHANGE UP (ref 0–0.7)
EOSINOPHIL # BLD AUTO: 0.53 K/UL — SIGNIFICANT CHANGE UP (ref 0–0.7)
EOSINOPHIL NFR BLD AUTO: 0.9 % — SIGNIFICANT CHANGE UP (ref 0–8)
EOSINOPHIL NFR BLD AUTO: 2.5 % — SIGNIFICANT CHANGE UP (ref 0–8)
EOSINOPHIL NFR BLD AUTO: 3.9 % — SIGNIFICANT CHANGE UP (ref 0–8)
EPI CELLS # UR: 0 /HPF — SIGNIFICANT CHANGE UP (ref 0–5)
FLUAV AG NPH QL: SIGNIFICANT CHANGE UP
FLUBV AG NPH QL: SIGNIFICANT CHANGE UP
GGT SERPL-CCNC: 1463 U/L — HIGH (ref 1–40)
GLUCOSE BLDC GLUCOMTR-MCNC: 111 MG/DL — HIGH (ref 70–99)
GLUCOSE BLDC GLUCOMTR-MCNC: 123 MG/DL — HIGH (ref 70–99)
GLUCOSE BLDC GLUCOMTR-MCNC: 136 MG/DL — HIGH (ref 70–99)
GLUCOSE BLDC GLUCOMTR-MCNC: 138 MG/DL — HIGH (ref 70–99)
GLUCOSE BLDC GLUCOMTR-MCNC: 141 MG/DL — HIGH (ref 70–99)
GLUCOSE BLDC GLUCOMTR-MCNC: 143 MG/DL — HIGH (ref 70–99)
GLUCOSE BLDC GLUCOMTR-MCNC: 150 MG/DL — HIGH (ref 70–99)
GLUCOSE BLDC GLUCOMTR-MCNC: 154 MG/DL — HIGH (ref 70–99)
GLUCOSE BLDC GLUCOMTR-MCNC: 154 MG/DL — HIGH (ref 70–99)
GLUCOSE BLDC GLUCOMTR-MCNC: 160 MG/DL — HIGH (ref 70–99)
GLUCOSE BLDC GLUCOMTR-MCNC: 222 MG/DL — HIGH (ref 70–99)
GLUCOSE SERPL-MCNC: 100 MG/DL — HIGH (ref 70–99)
GLUCOSE SERPL-MCNC: 114 MG/DL — HIGH (ref 70–99)
GLUCOSE SERPL-MCNC: 128 MG/DL — HIGH (ref 70–99)
GLUCOSE SERPL-MCNC: 164 MG/DL — HIGH (ref 70–99)
GLUCOSE SERPL-MCNC: 213 MG/DL — HIGH (ref 70–99)
GLUCOSE UR QL: NEGATIVE — SIGNIFICANT CHANGE UP
HCT VFR BLD CALC: 25 % — LOW (ref 42–52)
HCT VFR BLD CALC: 25.8 % — LOW (ref 42–52)
HCT VFR BLD CALC: 26 % — LOW (ref 42–52)
HCT VFR BLD CALC: 28.7 % — LOW (ref 42–52)
HGB BLD-MCNC: 7.9 G/DL — LOW (ref 14–18)
HGB BLD-MCNC: 9.1 G/DL — LOW (ref 14–18)
HYALINE CASTS # UR AUTO: 5 /LPF — SIGNIFICANT CHANGE UP (ref 0–7)
IMM GRANULOCYTES NFR BLD AUTO: 5.6 % — HIGH (ref 0.1–0.3)
IMM GRANULOCYTES NFR BLD AUTO: 5.6 % — HIGH (ref 0.1–0.3)
KETONES UR-MCNC: NEGATIVE — SIGNIFICANT CHANGE UP
LEUKOCYTE ESTERASE UR-ACNC: NEGATIVE — SIGNIFICANT CHANGE UP
LIDOCAIN IGE QN: 27 U/L — SIGNIFICANT CHANGE UP (ref 7–60)
LYMPHOCYTES # BLD AUTO: 0.24 K/UL — LOW (ref 1.2–3.4)
LYMPHOCYTES # BLD AUTO: 0.79 K/UL — LOW (ref 1.2–3.4)
LYMPHOCYTES # BLD AUTO: 0.97 K/UL — LOW (ref 1.2–3.4)
LYMPHOCYTES # BLD AUTO: 1.7 % — LOW (ref 20.5–51.1)
LYMPHOCYTES # BLD AUTO: 5.8 % — LOW (ref 20.5–51.1)
LYMPHOCYTES # BLD AUTO: 6.9 % — LOW (ref 20.5–51.1)
MAGNESIUM SERPL-MCNC: 2.2 MG/DL — SIGNIFICANT CHANGE UP (ref 1.8–2.4)
MAGNESIUM SERPL-MCNC: 2.2 MG/DL — SIGNIFICANT CHANGE UP (ref 1.8–2.4)
MANUAL SMEAR VERIFICATION: SIGNIFICANT CHANGE UP
MCHC RBC-ENTMCNC: 28.9 PG — SIGNIFICANT CHANGE UP (ref 27–31)
MCHC RBC-ENTMCNC: 29 PG — SIGNIFICANT CHANGE UP (ref 27–31)
MCHC RBC-ENTMCNC: 29 PG — SIGNIFICANT CHANGE UP (ref 27–31)
MCHC RBC-ENTMCNC: 29.2 PG — SIGNIFICANT CHANGE UP (ref 27–31)
MCHC RBC-ENTMCNC: 30.4 G/DL — LOW (ref 32–37)
MCHC RBC-ENTMCNC: 30.6 G/DL — LOW (ref 32–37)
MCHC RBC-ENTMCNC: 31.6 G/DL — LOW (ref 32–37)
MCHC RBC-ENTMCNC: 31.7 G/DL — LOW (ref 32–37)
MCV RBC AUTO: 91.9 FL — SIGNIFICANT CHANGE UP (ref 80–94)
MCV RBC AUTO: 92 FL — SIGNIFICANT CHANGE UP (ref 80–94)
MCV RBC AUTO: 94.9 FL — HIGH (ref 80–94)
MCV RBC AUTO: 95.2 FL — HIGH (ref 80–94)
MONOCYTES # BLD AUTO: 0.94 K/UL — HIGH (ref 0.1–0.6)
MONOCYTES # BLD AUTO: 1.01 K/UL — HIGH (ref 0.1–0.6)
MONOCYTES # BLD AUTO: 1.13 K/UL — HIGH (ref 0.1–0.6)
MONOCYTES NFR BLD AUTO: 6.9 % — SIGNIFICANT CHANGE UP (ref 1.7–9.3)
MONOCYTES NFR BLD AUTO: 7.2 % — SIGNIFICANT CHANGE UP (ref 1.7–9.3)
MONOCYTES NFR BLD AUTO: 7.9 % — SIGNIFICANT CHANGE UP (ref 1.7–9.3)
MYELOCYTES NFR BLD: 1.7 % — HIGH (ref 0–0)
NEUTROPHILS # BLD AUTO: 10.52 K/UL — HIGH (ref 1.4–6.5)
NEUTROPHILS # BLD AUTO: 10.8 K/UL — HIGH (ref 1.4–6.5)
NEUTROPHILS # BLD AUTO: 12.43 K/UL — HIGH (ref 1.4–6.5)
NEUTROPHILS NFR BLD AUTO: 77.3 % — HIGH (ref 42.2–75.2)
NEUTROPHILS NFR BLD AUTO: 77.4 % — HIGH (ref 42.2–75.2)
NEUTROPHILS NFR BLD AUTO: 86 % — HIGH (ref 42.2–75.2)
NEUTS BAND # BLD: 0.9 % — SIGNIFICANT CHANGE UP (ref 0–6)
NITRITE UR-MCNC: NEGATIVE — SIGNIFICANT CHANGE UP
NRBC # BLD: 0 /100 WBCS — SIGNIFICANT CHANGE UP (ref 0–0)
PH UR: 5.5 — SIGNIFICANT CHANGE UP (ref 5–8)
PLAT MORPH BLD: NORMAL — SIGNIFICANT CHANGE UP
PLATELET # BLD AUTO: 113 K/UL — LOW (ref 130–400)
PLATELET # BLD AUTO: 116 K/UL — LOW (ref 130–400)
PLATELET # BLD AUTO: 118 K/UL — LOW (ref 130–400)
PLATELET # BLD AUTO: 119 K/UL — LOW (ref 130–400)
POLYCHROMASIA BLD QL SMEAR: SLIGHT — SIGNIFICANT CHANGE UP
POTASSIUM SERPL-MCNC: 4.8 MMOL/L — SIGNIFICANT CHANGE UP (ref 3.5–5)
POTASSIUM SERPL-MCNC: 4.8 MMOL/L — SIGNIFICANT CHANGE UP (ref 3.5–5)
POTASSIUM SERPL-MCNC: 4.9 MMOL/L — SIGNIFICANT CHANGE UP (ref 3.5–5)
POTASSIUM SERPL-MCNC: 5.4 MMOL/L — HIGH (ref 3.5–5)
POTASSIUM SERPL-MCNC: 6.1 MMOL/L — CRITICAL HIGH (ref 3.5–5)
POTASSIUM SERPL-SCNC: 4.8 MMOL/L — SIGNIFICANT CHANGE UP (ref 3.5–5)
POTASSIUM SERPL-SCNC: 4.8 MMOL/L — SIGNIFICANT CHANGE UP (ref 3.5–5)
POTASSIUM SERPL-SCNC: 4.9 MMOL/L — SIGNIFICANT CHANGE UP (ref 3.5–5)
POTASSIUM SERPL-SCNC: 5.4 MMOL/L — HIGH (ref 3.5–5)
POTASSIUM SERPL-SCNC: 6.1 MMOL/L — CRITICAL HIGH (ref 3.5–5)
PROMYELOCYTES # FLD: 0.9 % — HIGH (ref 0–0)
PROT SERPL-MCNC: 6 G/DL — SIGNIFICANT CHANGE UP (ref 6–8)
PROT SERPL-MCNC: 6.3 G/DL — SIGNIFICANT CHANGE UP (ref 6–8)
PROT SERPL-MCNC: 7.4 G/DL — SIGNIFICANT CHANGE UP (ref 6–8)
PROT UR-MCNC: ABNORMAL
RBC # BLD: 2.72 M/UL — LOW (ref 4.7–6.1)
RBC # BLD: 2.72 M/UL — LOW (ref 4.7–6.1)
RBC # BLD: 2.73 M/UL — LOW (ref 4.7–6.1)
RBC # BLD: 3.12 M/UL — LOW (ref 4.7–6.1)
RBC # FLD: 16 % — HIGH (ref 11.5–14.5)
RBC # FLD: 16.2 % — HIGH (ref 11.5–14.5)
RBC BLD AUTO: NORMAL — SIGNIFICANT CHANGE UP
RBC CASTS # UR COMP ASSIST: 100 /HPF — HIGH (ref 0–4)
RSV RNA NPH QL NAA+NON-PROBE: SIGNIFICANT CHANGE UP
SARS-COV-2 RNA SPEC QL NAA+PROBE: SIGNIFICANT CHANGE UP
SODIUM SERPL-SCNC: 132 MMOL/L — LOW (ref 135–146)
SODIUM SERPL-SCNC: 134 MMOL/L — LOW (ref 135–146)
SODIUM SERPL-SCNC: 135 MMOL/L — SIGNIFICANT CHANGE UP (ref 135–146)
SP GR SPEC: >1.05 (ref 1.01–1.03)
SPECIMEN SOURCE: SIGNIFICANT CHANGE UP
TROPONIN T SERPL-MCNC: 0.02 NG/ML — HIGH
TROPONIN T SERPL-MCNC: 0.03 NG/ML — CRITICAL HIGH
UROBILINOGEN FLD QL: ABNORMAL
WBC # BLD: 13.52 K/UL — HIGH (ref 4.8–10.8)
WBC # BLD: 13.6 K/UL — HIGH (ref 4.8–10.8)
WBC # BLD: 13.98 K/UL — HIGH (ref 4.8–10.8)
WBC # BLD: 14.3 K/UL — HIGH (ref 4.8–10.8)
WBC # FLD AUTO: 13.52 K/UL — HIGH (ref 4.8–10.8)
WBC # FLD AUTO: 13.6 K/UL — HIGH (ref 4.8–10.8)
WBC # FLD AUTO: 13.98 K/UL — HIGH (ref 4.8–10.8)
WBC # FLD AUTO: 14.3 K/UL — HIGH (ref 4.8–10.8)
WBC UR QL: 4 /HPF — SIGNIFICANT CHANGE UP (ref 0–5)

## 2023-01-01 PROCEDURE — 99232 SBSQ HOSP IP/OBS MODERATE 35: CPT

## 2023-01-01 PROCEDURE — 99497 ADVNCD CARE PLAN 30 MIN: CPT | Mod: 25

## 2023-01-01 PROCEDURE — 99222 1ST HOSP IP/OBS MODERATE 55: CPT

## 2023-01-01 PROCEDURE — 71275 CT ANGIOGRAPHY CHEST: CPT | Mod: 26,MA

## 2023-01-01 PROCEDURE — 70552 MRI BRAIN STEM W/DYE: CPT | Mod: 26

## 2023-01-01 PROCEDURE — 74177 CT ABD & PELVIS W/CONTRAST: CPT | Mod: 26,MA

## 2023-01-01 PROCEDURE — 99233 SBSQ HOSP IP/OBS HIGH 50: CPT

## 2023-01-01 PROCEDURE — 71045 X-RAY EXAM CHEST 1 VIEW: CPT | Mod: 26

## 2023-01-01 PROCEDURE — 93010 ELECTROCARDIOGRAM REPORT: CPT

## 2023-01-01 PROCEDURE — 99223 1ST HOSP IP/OBS HIGH 75: CPT

## 2023-01-01 PROCEDURE — 99285 EMERGENCY DEPT VISIT HI MDM: CPT

## 2023-01-01 RX ORDER — HEPARIN SODIUM 5000 [USP'U]/ML
5000 INJECTION INTRAVENOUS; SUBCUTANEOUS EVERY 12 HOURS
Refills: 0 | Status: DISCONTINUED | OUTPATIENT
Start: 2023-01-01 | End: 2023-01-01

## 2023-01-01 RX ORDER — ATORVASTATIN CALCIUM 80 MG/1
40 TABLET, FILM COATED ORAL AT BEDTIME
Refills: 0 | Status: DISCONTINUED | OUTPATIENT
Start: 2023-01-01 | End: 2023-01-01

## 2023-01-01 RX ORDER — FAMOTIDINE 10 MG/ML
20 INJECTION INTRAVENOUS
Refills: 0 | Status: DISCONTINUED | OUTPATIENT
Start: 2023-01-01 | End: 2023-01-01

## 2023-01-01 RX ORDER — SODIUM CHLORIDE 9 MG/ML
1000 INJECTION, SOLUTION INTRAVENOUS ONCE
Refills: 0 | Status: COMPLETED | OUTPATIENT
Start: 2023-01-01 | End: 2023-01-01

## 2023-01-01 RX ORDER — PROCHLORPERAZINE MALEATE 5 MG
10 TABLET ORAL EVERY 6 HOURS
Refills: 0 | Status: DISCONTINUED | OUTPATIENT
Start: 2023-01-01 | End: 2023-01-01

## 2023-01-01 RX ORDER — MONTELUKAST 4 MG/1
10 TABLET, CHEWABLE ORAL DAILY
Refills: 0 | Status: DISCONTINUED | OUTPATIENT
Start: 2023-01-01 | End: 2023-01-01

## 2023-01-01 RX ORDER — SENNA PLUS 8.6 MG/1
2 TABLET ORAL AT BEDTIME
Refills: 0 | Status: DISCONTINUED | OUTPATIENT
Start: 2023-01-01 | End: 2023-01-01

## 2023-01-01 RX ORDER — ASPIRIN/CALCIUM CARB/MAGNESIUM 324 MG
81 TABLET ORAL DAILY
Refills: 0 | Status: DISCONTINUED | OUTPATIENT
Start: 2023-01-01 | End: 2023-01-01

## 2023-01-01 RX ORDER — SODIUM ZIRCONIUM CYCLOSILICATE 10 G/10G
5 POWDER, FOR SUSPENSION ORAL
Refills: 0 | Status: COMPLETED | OUTPATIENT
Start: 2023-01-01 | End: 2023-01-01

## 2023-01-01 RX ORDER — MONTELUKAST 4 MG/1
1 TABLET, CHEWABLE ORAL
Qty: 0 | Refills: 0 | DISCHARGE

## 2023-01-01 RX ORDER — SODIUM CHLORIDE 9 MG/ML
1000 INJECTION, SOLUTION INTRAVENOUS
Refills: 0 | Status: DISCONTINUED | OUTPATIENT
Start: 2023-01-01 | End: 2023-01-01

## 2023-01-01 RX ORDER — LANOLIN ALCOHOL/MO/W.PET/CERES
3 CREAM (GRAM) TOPICAL AT BEDTIME
Refills: 0 | Status: DISCONTINUED | OUTPATIENT
Start: 2023-01-01 | End: 2023-01-01

## 2023-01-01 RX ORDER — DEXTROSE 50 % IN WATER 50 %
50 SYRINGE (ML) INTRAVENOUS ONCE
Refills: 0 | Status: DISCONTINUED | OUTPATIENT
Start: 2023-01-01 | End: 2023-01-01

## 2023-01-01 RX ORDER — INSULIN HUMAN 100 [IU]/ML
6 INJECTION, SOLUTION SUBCUTANEOUS ONCE
Refills: 0 | Status: COMPLETED | OUTPATIENT
Start: 2023-01-01 | End: 2023-01-01

## 2023-01-01 RX ORDER — POLYETHYLENE GLYCOL 3350 17 G/17G
17 POWDER, FOR SOLUTION ORAL
Refills: 0 | Status: DISCONTINUED | OUTPATIENT
Start: 2023-01-01 | End: 2023-01-01

## 2023-01-01 RX ORDER — INFLUENZA VIRUS VACCINE 15; 15; 15; 15 UG/.5ML; UG/.5ML; UG/.5ML; UG/.5ML
0.7 SUSPENSION INTRAMUSCULAR ONCE
Refills: 0 | Status: DISCONTINUED | OUTPATIENT
Start: 2023-01-01 | End: 2023-01-01

## 2023-01-01 RX ORDER — HYDROMORPHONE HYDROCHLORIDE 2 MG/ML
0.25 INJECTION INTRAMUSCULAR; INTRAVENOUS; SUBCUTANEOUS
Refills: 0 | Status: DISCONTINUED | OUTPATIENT
Start: 2023-01-01 | End: 2023-01-01

## 2023-01-01 RX ORDER — LISINOPRIL 2.5 MG/1
5 TABLET ORAL DAILY
Refills: 0 | Status: DISCONTINUED | OUTPATIENT
Start: 2023-01-01 | End: 2023-01-01

## 2023-01-01 RX ORDER — DEXAMETHASONE 0.5 MG/5ML
1 ELIXIR ORAL DAILY
Refills: 0 | Status: DISCONTINUED | OUTPATIENT
Start: 2023-01-01 | End: 2023-01-01

## 2023-01-01 RX ORDER — TRAMADOL HYDROCHLORIDE 50 MG/1
50 TABLET ORAL EVERY 6 HOURS
Refills: 0 | Status: DISCONTINUED | OUTPATIENT
Start: 2023-01-01 | End: 2023-01-01

## 2023-01-01 RX ORDER — METFORMIN HYDROCHLORIDE 850 MG/1
1 TABLET ORAL
Qty: 0 | Refills: 0 | DISCHARGE

## 2023-01-01 RX ORDER — IPRATROPIUM/ALBUTEROL SULFATE 18-103MCG
3 AEROSOL WITH ADAPTER (GRAM) INHALATION ONCE
Refills: 0 | Status: COMPLETED | OUTPATIENT
Start: 2023-01-01 | End: 2023-01-01

## 2023-01-01 RX ORDER — LISINOPRIL 2.5 MG/1
1 TABLET ORAL
Qty: 0 | Refills: 0 | DISCHARGE

## 2023-01-01 RX ORDER — FAMOTIDINE 10 MG/ML
1 INJECTION INTRAVENOUS
Qty: 0 | Refills: 0 | DISCHARGE

## 2023-01-01 RX ORDER — CHOLESTYRAMINE 4 G/9G
1 POWDER, FOR SUSPENSION ORAL
Qty: 0 | Refills: 0 | DISCHARGE

## 2023-01-01 RX ORDER — ATORVASTATIN CALCIUM 80 MG/1
1 TABLET, FILM COATED ORAL
Qty: 0 | Refills: 0 | DISCHARGE

## 2023-01-01 RX ORDER — CHOLESTYRAMINE 4 G/9G
4 POWDER, FOR SUSPENSION ORAL THREE TIMES A DAY
Refills: 0 | Status: DISCONTINUED | OUTPATIENT
Start: 2023-01-01 | End: 2023-01-01

## 2023-01-01 RX ORDER — CEFTRIAXONE 500 MG/1
1000 INJECTION, POWDER, FOR SOLUTION INTRAMUSCULAR; INTRAVENOUS EVERY 24 HOURS
Refills: 0 | Status: DISCONTINUED | OUTPATIENT
Start: 2023-01-01 | End: 2023-01-01

## 2023-01-01 RX ORDER — DEXAMETHASONE 0.5 MG/5ML
1 ELIXIR ORAL
Qty: 0 | Refills: 0 | DISCHARGE

## 2023-01-01 RX ORDER — DRONABINOL 2.5 MG
2.5 CAPSULE ORAL
Refills: 0 | Status: DISCONTINUED | OUTPATIENT
Start: 2023-01-01 | End: 2023-01-01

## 2023-01-01 RX ORDER — ONDANSETRON 8 MG/1
4 TABLET, FILM COATED ORAL EVERY 8 HOURS
Refills: 0 | Status: DISCONTINUED | OUTPATIENT
Start: 2023-01-01 | End: 2023-01-01

## 2023-01-01 RX ORDER — ACETAMINOPHEN 500 MG
650 TABLET ORAL EVERY 6 HOURS
Refills: 0 | Status: DISCONTINUED | OUTPATIENT
Start: 2023-01-01 | End: 2023-01-01

## 2023-01-01 RX ORDER — NICOTINE POLACRILEX 2 MG
1 GUM BUCCAL DAILY
Refills: 0 | Status: DISCONTINUED | OUTPATIENT
Start: 2023-01-01 | End: 2023-01-01

## 2023-01-01 RX ADMIN — Medication 10 MILLIGRAM(S): at 11:51

## 2023-01-01 RX ADMIN — Medication 10 MILLIGRAM(S): at 08:05

## 2023-01-01 RX ADMIN — CHOLESTYRAMINE 4 GRAM(S): 4 POWDER, FOR SUSPENSION ORAL at 14:21

## 2023-01-01 RX ADMIN — TRAMADOL HYDROCHLORIDE 50 MILLIGRAM(S): 50 TABLET ORAL at 12:46

## 2023-01-01 RX ADMIN — FAMOTIDINE 20 MILLIGRAM(S): 10 INJECTION INTRAVENOUS at 05:07

## 2023-01-01 RX ADMIN — Medication 1 MILLIGRAM(S): at 05:35

## 2023-01-01 RX ADMIN — POLYETHYLENE GLYCOL 3350 17 GRAM(S): 17 POWDER, FOR SOLUTION ORAL at 05:15

## 2023-01-01 RX ADMIN — Medication 2.5 MILLIGRAM(S): at 05:15

## 2023-01-01 RX ADMIN — Medication 1 MILLIGRAM(S): at 05:24

## 2023-01-01 RX ADMIN — TRAMADOL HYDROCHLORIDE 50 MILLIGRAM(S): 50 TABLET ORAL at 23:46

## 2023-01-01 RX ADMIN — CHOLESTYRAMINE 4 GRAM(S): 4 POWDER, FOR SUSPENSION ORAL at 21:30

## 2023-01-01 RX ADMIN — LISINOPRIL 5 MILLIGRAM(S): 2.5 TABLET ORAL at 05:05

## 2023-01-01 RX ADMIN — Medication 2.5 MILLIGRAM(S): at 05:35

## 2023-01-01 RX ADMIN — Medication 10 MILLIGRAM(S): at 23:42

## 2023-01-01 RX ADMIN — Medication 10 MILLIGRAM(S): at 17:04

## 2023-01-01 RX ADMIN — POLYETHYLENE GLYCOL 3350 17 GRAM(S): 17 POWDER, FOR SOLUTION ORAL at 05:36

## 2023-01-01 RX ADMIN — Medication 1 PATCH: at 07:11

## 2023-01-01 RX ADMIN — Medication 10 MILLIGRAM(S): at 23:46

## 2023-01-01 RX ADMIN — Medication 10 MILLIGRAM(S): at 11:02

## 2023-01-01 RX ADMIN — CHOLESTYRAMINE 4 GRAM(S): 4 POWDER, FOR SUSPENSION ORAL at 05:23

## 2023-01-01 RX ADMIN — SODIUM CHLORIDE 100 MILLILITER(S): 9 INJECTION, SOLUTION INTRAVENOUS at 20:03

## 2023-01-01 RX ADMIN — HEPARIN SODIUM 5000 UNIT(S): 5000 INJECTION INTRAVENOUS; SUBCUTANEOUS at 17:04

## 2023-01-01 RX ADMIN — TRAMADOL HYDROCHLORIDE 50 MILLIGRAM(S): 50 TABLET ORAL at 11:54

## 2023-01-01 RX ADMIN — Medication 1 PATCH: at 12:49

## 2023-01-01 RX ADMIN — SENNA PLUS 2 TABLET(S): 8.6 TABLET ORAL at 21:56

## 2023-01-01 RX ADMIN — TRAMADOL HYDROCHLORIDE 50 MILLIGRAM(S): 50 TABLET ORAL at 05:07

## 2023-01-01 RX ADMIN — HYDROMORPHONE HYDROCHLORIDE 0.25 MILLIGRAM(S): 2 INJECTION INTRAMUSCULAR; INTRAVENOUS; SUBCUTANEOUS at 01:15

## 2023-01-01 RX ADMIN — TRAMADOL HYDROCHLORIDE 50 MILLIGRAM(S): 50 TABLET ORAL at 05:24

## 2023-01-01 RX ADMIN — FAMOTIDINE 20 MILLIGRAM(S): 10 INJECTION INTRAVENOUS at 05:25

## 2023-01-01 RX ADMIN — CHOLESTYRAMINE 4 GRAM(S): 4 POWDER, FOR SUSPENSION ORAL at 08:24

## 2023-01-01 RX ADMIN — POLYETHYLENE GLYCOL 3350 17 GRAM(S): 17 POWDER, FOR SOLUTION ORAL at 17:23

## 2023-01-01 RX ADMIN — Medication 2.5 MILLIGRAM(S): at 17:22

## 2023-01-01 RX ADMIN — SENNA PLUS 2 TABLET(S): 8.6 TABLET ORAL at 21:29

## 2023-01-01 RX ADMIN — MONTELUKAST 10 MILLIGRAM(S): 4 TABLET, CHEWABLE ORAL at 11:51

## 2023-01-01 RX ADMIN — MONTELUKAST 10 MILLIGRAM(S): 4 TABLET, CHEWABLE ORAL at 13:12

## 2023-01-01 RX ADMIN — MONTELUKAST 10 MILLIGRAM(S): 4 TABLET, CHEWABLE ORAL at 11:02

## 2023-01-01 RX ADMIN — HYDROMORPHONE HYDROCHLORIDE 0.25 MILLIGRAM(S): 2 INJECTION INTRAMUSCULAR; INTRAVENOUS; SUBCUTANEOUS at 16:30

## 2023-01-01 RX ADMIN — HYDROMORPHONE HYDROCHLORIDE 0.25 MILLIGRAM(S): 2 INJECTION INTRAMUSCULAR; INTRAVENOUS; SUBCUTANEOUS at 00:39

## 2023-01-01 RX ADMIN — TRAMADOL HYDROCHLORIDE 50 MILLIGRAM(S): 50 TABLET ORAL at 23:42

## 2023-01-01 RX ADMIN — TRAMADOL HYDROCHLORIDE 50 MILLIGRAM(S): 50 TABLET ORAL at 05:35

## 2023-01-01 RX ADMIN — CEFTRIAXONE 100 MILLIGRAM(S): 500 INJECTION, POWDER, FOR SOLUTION INTRAMUSCULAR; INTRAVENOUS at 21:30

## 2023-01-01 RX ADMIN — SODIUM CHLORIDE 1000 MILLILITER(S): 9 INJECTION, SOLUTION INTRAVENOUS at 16:05

## 2023-01-01 RX ADMIN — HYDROMORPHONE HYDROCHLORIDE 0.25 MILLIGRAM(S): 2 INJECTION INTRAMUSCULAR; INTRAVENOUS; SUBCUTANEOUS at 15:57

## 2023-01-01 RX ADMIN — FAMOTIDINE 20 MILLIGRAM(S): 10 INJECTION INTRAVENOUS at 17:23

## 2023-01-01 RX ADMIN — Medication 1 PATCH: at 12:16

## 2023-01-01 RX ADMIN — LISINOPRIL 5 MILLIGRAM(S): 2.5 TABLET ORAL at 05:16

## 2023-01-01 RX ADMIN — Medication 2.5 MILLIGRAM(S): at 05:10

## 2023-01-01 RX ADMIN — Medication 2.5 MILLIGRAM(S): at 05:24

## 2023-01-01 RX ADMIN — ATORVASTATIN CALCIUM 40 MILLIGRAM(S): 80 TABLET, FILM COATED ORAL at 22:49

## 2023-01-01 RX ADMIN — CHOLESTYRAMINE 4 GRAM(S): 4 POWDER, FOR SUSPENSION ORAL at 05:36

## 2023-01-01 RX ADMIN — Medication 81 MILLIGRAM(S): at 13:12

## 2023-01-01 RX ADMIN — Medication 10 MILLIGRAM(S): at 17:23

## 2023-01-01 RX ADMIN — CHOLESTYRAMINE 4 GRAM(S): 4 POWDER, FOR SUSPENSION ORAL at 13:12

## 2023-01-01 RX ADMIN — Medication 1 MILLIGRAM(S): at 05:16

## 2023-01-01 RX ADMIN — Medication 1 MILLIGRAM(S): at 05:07

## 2023-01-01 RX ADMIN — Medication 1 PATCH: at 11:01

## 2023-01-01 RX ADMIN — POLYETHYLENE GLYCOL 3350 17 GRAM(S): 17 POWDER, FOR SOLUTION ORAL at 05:07

## 2023-01-01 RX ADMIN — ATORVASTATIN CALCIUM 40 MILLIGRAM(S): 80 TABLET, FILM COATED ORAL at 21:30

## 2023-01-01 RX ADMIN — Medication 10 MILLIGRAM(S): at 18:59

## 2023-01-01 RX ADMIN — HEPARIN SODIUM 5000 UNIT(S): 5000 INJECTION INTRAVENOUS; SUBCUTANEOUS at 05:36

## 2023-01-01 RX ADMIN — LISINOPRIL 5 MILLIGRAM(S): 2.5 TABLET ORAL at 05:35

## 2023-01-01 RX ADMIN — POLYETHYLENE GLYCOL 3350 17 GRAM(S): 17 POWDER, FOR SOLUTION ORAL at 05:24

## 2023-01-01 RX ADMIN — TRAMADOL HYDROCHLORIDE 50 MILLIGRAM(S): 50 TABLET ORAL at 17:22

## 2023-01-01 RX ADMIN — HYDROMORPHONE HYDROCHLORIDE 0.25 MILLIGRAM(S): 2 INJECTION INTRAMUSCULAR; INTRAVENOUS; SUBCUTANEOUS at 20:45

## 2023-01-01 RX ADMIN — TRAMADOL HYDROCHLORIDE 50 MILLIGRAM(S): 50 TABLET ORAL at 19:26

## 2023-01-01 RX ADMIN — Medication 1 PATCH: at 11:02

## 2023-01-01 RX ADMIN — CHOLESTYRAMINE 4 GRAM(S): 4 POWDER, FOR SUSPENSION ORAL at 14:51

## 2023-01-01 RX ADMIN — Medication 10 MILLIGRAM(S): at 05:16

## 2023-01-01 RX ADMIN — FAMOTIDINE 20 MILLIGRAM(S): 10 INJECTION INTRAVENOUS at 17:04

## 2023-01-01 RX ADMIN — Medication 10 MILLIGRAM(S): at 05:25

## 2023-01-01 RX ADMIN — HYDROMORPHONE HYDROCHLORIDE 0.25 MILLIGRAM(S): 2 INJECTION INTRAMUSCULAR; INTRAVENOUS; SUBCUTANEOUS at 20:29

## 2023-01-01 RX ADMIN — Medication 1 PATCH: at 19:00

## 2023-01-01 RX ADMIN — MONTELUKAST 10 MILLIGRAM(S): 4 TABLET, CHEWABLE ORAL at 12:44

## 2023-01-01 RX ADMIN — INSULIN HUMAN 6 UNIT(S): 100 INJECTION, SOLUTION SUBCUTANEOUS at 11:51

## 2023-01-01 RX ADMIN — Medication 3 MILLILITER(S): at 17:39

## 2023-01-01 RX ADMIN — TRAMADOL HYDROCHLORIDE 50 MILLIGRAM(S): 50 TABLET ORAL at 06:15

## 2023-01-01 RX ADMIN — SODIUM ZIRCONIUM CYCLOSILICATE 5 GRAM(S): 10 POWDER, FOR SUSPENSION ORAL at 05:07

## 2023-01-01 RX ADMIN — TRAMADOL HYDROCHLORIDE 50 MILLIGRAM(S): 50 TABLET ORAL at 13:14

## 2023-01-01 RX ADMIN — Medication 2.5 MILLIGRAM(S): at 17:04

## 2023-01-01 RX ADMIN — POLYETHYLENE GLYCOL 3350 17 GRAM(S): 17 POWDER, FOR SOLUTION ORAL at 17:04

## 2023-01-01 RX ADMIN — FAMOTIDINE 20 MILLIGRAM(S): 10 INJECTION INTRAVENOUS at 05:15

## 2023-01-01 RX ADMIN — TRAMADOL HYDROCHLORIDE 50 MILLIGRAM(S): 50 TABLET ORAL at 13:15

## 2023-01-01 RX ADMIN — FAMOTIDINE 20 MILLIGRAM(S): 10 INJECTION INTRAVENOUS at 18:55

## 2023-01-01 RX ADMIN — Medication 10 MILLIGRAM(S): at 13:13

## 2023-01-01 RX ADMIN — CHOLESTYRAMINE 4 GRAM(S): 4 POWDER, FOR SUSPENSION ORAL at 05:07

## 2023-01-01 RX ADMIN — POLYETHYLENE GLYCOL 3350 17 GRAM(S): 17 POWDER, FOR SOLUTION ORAL at 18:58

## 2023-01-01 RX ADMIN — Medication 81 MILLIGRAM(S): at 12:44

## 2023-01-01 RX ADMIN — Medication 2.5 MILLIGRAM(S): at 18:58

## 2023-01-01 RX ADMIN — HEPARIN SODIUM 5000 UNIT(S): 5000 INJECTION INTRAVENOUS; SUBCUTANEOUS at 05:26

## 2023-01-01 RX ADMIN — TRAMADOL HYDROCHLORIDE 50 MILLIGRAM(S): 50 TABLET ORAL at 01:30

## 2023-01-01 RX ADMIN — CHOLESTYRAMINE 4 GRAM(S): 4 POWDER, FOR SUSPENSION ORAL at 17:23

## 2023-01-01 RX ADMIN — SENNA PLUS 2 TABLET(S): 8.6 TABLET ORAL at 22:49

## 2023-01-01 RX ADMIN — HEPARIN SODIUM 5000 UNIT(S): 5000 INJECTION INTRAVENOUS; SUBCUTANEOUS at 19:00

## 2023-01-01 RX ADMIN — HYDROMORPHONE HYDROCHLORIDE 0.25 MILLIGRAM(S): 2 INJECTION INTRAMUSCULAR; INTRAVENOUS; SUBCUTANEOUS at 18:42

## 2023-01-01 RX ADMIN — SODIUM CHLORIDE 75 MILLILITER(S): 9 INJECTION, SOLUTION INTRAVENOUS at 14:45

## 2023-01-01 RX ADMIN — Medication 81 MILLIGRAM(S): at 11:51

## 2023-01-01 RX ADMIN — CHOLESTYRAMINE 4 GRAM(S): 4 POWDER, FOR SUSPENSION ORAL at 21:55

## 2023-01-01 RX ADMIN — CHOLESTYRAMINE 4 GRAM(S): 4 POWDER, FOR SUSPENSION ORAL at 21:31

## 2023-01-01 RX ADMIN — Medication 10 MILLIGRAM(S): at 03:19

## 2023-01-01 RX ADMIN — TRAMADOL HYDROCHLORIDE 50 MILLIGRAM(S): 50 TABLET ORAL at 00:52

## 2023-01-01 RX ADMIN — SENNA PLUS 2 TABLET(S): 8.6 TABLET ORAL at 21:30

## 2023-01-01 RX ADMIN — Medication 1 PATCH: at 11:52

## 2023-01-01 RX ADMIN — Medication 10 MILLIGRAM(S): at 12:43

## 2023-01-01 RX ADMIN — ATORVASTATIN CALCIUM 40 MILLIGRAM(S): 80 TABLET, FILM COATED ORAL at 21:57

## 2023-01-01 RX ADMIN — Medication 81 MILLIGRAM(S): at 11:02

## 2023-01-01 RX ADMIN — TRAMADOL HYDROCHLORIDE 50 MILLIGRAM(S): 50 TABLET ORAL at 14:18

## 2023-01-01 RX ADMIN — SODIUM ZIRCONIUM CYCLOSILICATE 5 GRAM(S): 10 POWDER, FOR SUSPENSION ORAL at 14:48

## 2023-01-01 RX ADMIN — TRAMADOL HYDROCHLORIDE 50 MILLIGRAM(S): 50 TABLET ORAL at 18:56

## 2023-01-01 RX ADMIN — FAMOTIDINE 20 MILLIGRAM(S): 10 INJECTION INTRAVENOUS at 05:35

## 2023-01-01 RX ADMIN — CEFTRIAXONE 100 MILLIGRAM(S): 500 INJECTION, POWDER, FOR SOLUTION INTRAMUSCULAR; INTRAVENOUS at 22:01

## 2023-01-01 NOTE — HISTORY OF PRESENT ILLNESS
[de-identified] : \par Mr. Vasquez is a 74 y/o M, current smoker, PMH of DM, HTN, HLD and AS, + smoker presenting for initial evaluation today for newly diagnosed NSCLC, adenocarcinoma. He presents with his son Primo.\par \par His history dates back to late April 2021 when he presented to the ED for shortness of breath after having a positive stress test as an outpatient. He reported that he could walk less than 1 block before becoming SOB which has progressively worsened. He denies any palpitations or chest pain/dizziness/syncope. He underwent cardiac catheterization and was found to have multivessel disease. He was seen by CT surgery while inpatient for AVR/CABG. Preop CT Chest showed a lobulated solid 2.7 cm RUL nodule, and a 5 mm ANIA nodule, and a 4.0 x 2.9 cm right paratracheal soft tissue lesion. S/P Bronch/EBUS 4/30/2021- Prelim Bronch/EBUS path showed carcinoma. Brain MRI negative for metastatic disease. He was discharged with outpatient follow up with oncology and cardiology. \par \par He was subsequently discharged and underwent a PET/CT as outpatient which showed FDG avid right upper lobe nodule, SUV max 23.7 compatible with biologic tumor activity. Ipsilateral FDG avid lymphadenopathy up to 17.0 compatible with hetal metastases.\par \par Final pathology shows Non-small cell carcinoma, favor adenocarcinoma. Immunohistochemical stains are performed and show the tumor is\par positive for CK7, negative for CK20, TTF-1, napsin, CDX2, PAX8; findings are compatible with primary lung adenocarcinoma\par \par  [de-identified] : terval History: 5/19/2021: Today, he feels well. He denies any chest pain, coughing, wheezing, or abdominal pain. He is still actively smoking. He finds it very difficult to stop. He smokes about 1 PPD for at least 60 years. \par 6/24/21:\par  Mr. CASTANEDA came for a follow up visit for NSCLC, lung adenocarcinoma. He completed 15 RT treatment and he is due for the 3rd cycle of Chemo. He reports feels well. He denies any chest pain, coughing, wheezing, abdominal pain. dysphagia. Patient tolerating diet well. \par CBC , CMP reviewed with acceptable results. \par  He is still actively smoking. He finds it very difficult to stop. He smokes about 1 PPD for at least 60 years.\par 7/15/21;\par  Mr. CASTANEDA came for a follow up visit for NSCLC, lung adenocarcinoma. He completed 22 RT treatment and he is due for the 6th cycle of Carboplatin and taxol. He reports feels well. But he reports SOB on exertion and occasionally coughing. He is still actively smoking about 15 cigarette per day. \par  He denies any chest pain, abdominal pain. dysphagia. Patient tolerating diet well. \par CBC , CMP reviewed with acceptable results. Pulse Ox is 98% on RA.\par  He finds it very difficult to stop. He smokes about 1 PPD for at least 60 years.\par We explain to monitor for Sever coughing or CP. he needs to report to ER in case if any worsen symptoms. \par \par 08/12/2021 patient returns after completion of chemoradiation for stage 3 NSCLC , he complains of weakness, cough , decreased appetite and abnormal taste . he has dyspnea on mild exertion . he is receiving IV magnesium for low mg . He lost few lbs so far. Hgb is down to 9.7 from 12 in one month , no hematochezia. \par \par 08/26/2021 Patient returns for follow up with more weight loss . His breathing is stable , no cough . no fever . \par \par 1/6/2022 Patient returns for follow p , he feels well , he is gaining weight , he denies change in cough or breathing . As per his son , he was never given prescription for PET scan . \par \par 2/10/2022 Patient returns for second dose of durvalmab , he complains of LLQ pain , constipation , also lower back pain radiating to the LLE with activity . no hematochezia , weakness. Still awaiting Ct scan ( PET was denied ) \par \par 3/10/2022 Patient returns for follow p , CT scan shows a new ill defined hepatic lesion suspicious for metastasis . lung mass and adenopathy has diminished . \par He feels well and denies abdominal pain , change in breathing or cough . \par \par 4/26/2022 Patient returns for follow up , PET scan shows extensive recurrence involving abdominopelvic LNs , bone ( right iliac wing , T2 , C4 without CT correlate ) , liver lesion measuring 5.8 X 5.2 cm . \par Marked decrease in RUL nodule , hilar and paratracheal adenopathy s/p radiation .\par He remains totally asymptomatic and denies any pain, weight loss, change in cough or breathing . \par \par 5/26/2022 Patient returns after Alimta X1 , he developed rash , stomatitis for several days . As per his son , he did not take decadron as recommended. He denies weight loss or diarrhea. \par \par 8/11/2022 Patient returns for follow up with increased weakness, exertional dyspnea , weight loss. , he was treated recently for exacerbated COPD and suspected  pneumonia , CT chest showed new RLL opacity . In addition he complains of urinary frequency and dysuria . \par CT abdomen showed increase in liver metastasis . he denies any pain . \par \par 12/30/2022 Patient returns for follow up , he was admitted recently for constipation and was found with progressive disease, increased liver metastasis . He has declined with  weakness, weight loss , dizziness, SOB . blood work notable for anemia ( Hgb > 9 ) , mild prerenal azotemia . \par he complains of worsening neck pain secondary to sclerotic metastasis noted on CT of cervical spine , no evidence of cord compression .

## 2023-01-01 NOTE — PHYSICAL EXAM
[de-identified] : pale , chronically ill , emaciated in mild respiratory distress.  [de-identified] : diffuse coarse ronchi.  [de-identified] : no edema.

## 2023-01-01 NOTE — ASSESSMENT
[FreeTextEntry1] : 77 year old male with end stage lung cancer with marked decline , failure to thrive . \par Neck pain secondary to bone mets .\par \par Situation discussed at length with daughter , recommend comfort measures only , she will discuss further with brother before final decision on hospice . In the interim he will continue on miralax , start on tramadol for pain .

## 2023-01-09 NOTE — H&P ADULT - HISTORY OF PRESENT ILLNESS
ROS   Positive   Negative fever, chills, nausea, vomiting, chest pain, abdominal pain, sob, cough, constipation, diarrhea, incontinence    ED Vital Signs Last 24 Hrs  T(C): 36.3 (09 Jan 2023 16:17), Max: 36.3 (09 Jan 2023 16:17)  T(F): 97.4 (09 Jan 2023 16:17), Max: 97.4 (09 Jan 2023 16:17)  HR: 98 (09 Jan 2023 16:17) (98 - 98)  BP: 108/52 (09 Jan 2023 16:17) (108/52 - 108/52)  RR: 18 (09 Jan 2023 16:17) (18 - 18)  SpO2: 100% (09 Jan 2023 16:17) (100% - 100%)    Labs significant for WBC 14.3 - Hb 9.1 - MCV 92 -  - AG 15 - crea 1.3 (b/l 1.4) - ALP 1382 - trop 0.03 x1   UA neg - RVP neg   EKG NSR - LAFB   CXR unofficial - diffuse interstitial markings - R lung opacities   CT agnio no PE - small airway disease - bilobar hepatic mets with increasing size - small volume ascites - bilat trace pleural effusion - abdo/retrioperitoneal LN   In the ED given 1L LR  77 year old patient known to have, NSCLC Ca Stage 3 not on Chemo, HTN, HLD, DM and CKD [id number], presented to ED for abdominal pain of 10 days duration.   Patient reports that he has been having B/L lower abdominal pain, intermittent and sharp associated with constipation. His PCP prescribed him Famotidine, Dexamethasone 1 mg daily and Lactulose with no improvement in his symptoms. No nausea, no vomiting and tolerating PO intake (no change from baseline which is decreased in appetite). He also complaints of dizziness, neck pain and SOB upon exertion.   He denies fever, chills, URT or urinary symptoms.     ROS   Positive   Negative fever, chills, nausea, vomiting, chest pain, abdominal pain, sob, cough, constipation, diarrhea, incontinence    ED Vital Signs Last 24 Hrs  T(C): 36.3 (09 Jan 2023 16:17), Max: 36.3 (09 Jan 2023 16:17)  T(F): 97.4 (09 Jan 2023 16:17), Max: 97.4 (09 Jan 2023 16:17)  HR: 98 (09 Jan 2023 16:17) (98 - 98)  BP: 108/52 (09 Jan 2023 16:17) (108/52 - 108/52)  RR: 18 (09 Jan 2023 16:17) (18 - 18)  SpO2: 100% (09 Jan 2023 16:17) (100% - 100%)    Labs significant for WBC 14.3 - Hb 9.1 - MCV 92 -  - AG 15 - crea 1.3 (b/l 1.4) - ALP 1382 - trop 0.03 x1   UA neg - RVP neg   EKG NSR - LAFB   CXR unofficial - diffuse interstitial markings - R lung opacities   CT agnio no PE - small airway disease - bilobar hepatic mets with increasing size - small volume ascites - bilat trace pleural effusion - abdo/retrioperitoneal LN   In the ED given 1L LR  77 year old patient known to have, NSCLC Ca Stage 3 not on Chemo, HTN, HLD, DM and CKD [id number], presented to ED for abdominal discomfort associated with new abdominal mass  Patient reports that he has been having B/L lower abdominal pain, intermittent and sharp associated with abdominal mass periumbilical that started since previous admission.   His PCP prescribed him Famotidine, Dexamethasone 1 mg daily and Lactulose with no improvement in his symptoms.   No nausea, no vomiting and tolerating PO intake (no change from baseline which is decreased in appetite) - only tolerating eating soups but today he has not ate. He is going to the bathroom  He also complaints of dizziness, neck pain and SOB upon exertion.    He denies fever, chills, URT or urinary symptoms.     ROS   Positive abdominal swelling and mass, poor oral intake   Negative fever, chills, nausea, vomiting, chest pain, abdominal pain, sob, cough, constipation, diarrhea, incontinence    ED Vital Signs Last 24 Hrs  T(C): 36.3 (09 Jan 2023 16:17), Max: 36.3 (09 Jan 2023 16:17)  T(F): 97.4 (09 Jan 2023 16:17), Max: 97.4 (09 Jan 2023 16:17)  HR: 98 (09 Jan 2023 16:17) (98 - 98)  BP: 108/52 (09 Jan 2023 16:17) (108/52 - 108/52)  RR: 18 (09 Jan 2023 16:17) (18 - 18)  SpO2: 100% (09 Jan 2023 16:17) (100% - 100%)    Labs significant for WBC 14.3 - Hb 9.1 - MCV 92 -  - AG 15 - crea 1.3 (b/l 1.4) - ALP 1382 - trop 0.03 x1   UA neg - RVP neg   EKG NSR - LAFB   CXR unofficial - diffuse interstitial markings - R lung opacities   CT agnio no PE - small airway disease - bilobar hepatic mets with increasing size - small volume ascites - bilat trace pleural effusion - abdo/retrioperitoneal LN   In the ED given 1L LR

## 2023-01-09 NOTE — H&P ADULT - ATTENDING COMMENTS
77M PMH: NSCLC Ca Stage 3 not on Chemo, HTN, HLD, DM2 and CKD3a, presents with abdominal pain associated with constipation of 10 days duration. Admitted for pain control and palliative care.     Vital Signs Last 24 Hrs  T(C): 36 (10 Dereck 2023 13:00), Max: 36.3 (09 Jan 2023 16:17)  T(F): 96.8 (10 Dereck 2023 13:00), Max: 97.4 (09 Jan 2023 16:17)  HR: 72 (10 Dereck 2023 13:00) (72 - 98)  BP: 100/51 (10 Dereck 2023 13:00) (100/51 - 112/53)  BP(mean): 77 (10 Dereck 2023 00:19) (77 - 77)  RR: 18 (10 Dereck 2023 13:00) (18 - 18)  SpO2: 100% (10 Dereck 2023 00:19) (100% - 100%)    Parameters below as of 10 Dereck 2023 00:19  Patient On (Oxygen Delivery Method): nasal cannula  O2 Flow (L/min): 2    GEN: NAD / Mumbles but understands discussion (baseline communication?)  CV: NL S1/2  RESP: Decreased BS   GI: +BS Soft NT ND  Ext: No e/c/c   Neuro: mumbled speech unknown baseline     CT A/P/Chest:   IMPRESSION:  1. Since August 28, 2022, no evidence of pulmonary embolus;   redemonstrated are scattered areas of bronchiolectasis and bronchial wall   thickening with areas of mucous plugging, compatible with small airways   disease.    2. Since December 19, 2022, innumerable bilobar hepatic metastases have   overall increased in size and number.    3. Increased bulky upper abdominal and retroperitoneal adenopathy.    4. New small volume abdominopelvic ascites and new trace bilateral   pleural effusions, right greater than left.      Impression: CXR   Right suprahilar opacity. Without difference.  Please see concurrent CT scan of the thorax.                          7.9    13.98 )-----------( 119      ( 10 Dereck 2023 07:05 )             25.0   01-10    135  |  100  |  32<H>  ----------------------------<  100<H>  4.8   |  20  |  1.2    Ca    8.9      10 Dereck 2023 07:05    TPro  6.3  /  Alb  3.4<L>  /  TBili  0.8  /  DBili  x   /  AST  71<H>  /  ALT  33  /  AlkPhos  994<H>  01-10    IMPRESSION:  Progression of NSCLCA (Stage 4 at this time)   Active Smoker   Leukocytosis likely 2/2 malignancy   Dehydration   CKD3a     Plan:  Check Brain CT vs Brain MRI r/o Brain Mets   Check UA if + send blood and urine cxs and start ceftriaxone 1g iv qd  LR 100cc/hr x1L also to prevent CAROLYN  NEEDS GOC: Palliative Care / Hospice Consult / HemeOnc Consult  May need to arrange family meeting with hospital providers and Dr Collins (known to pt)  Resident to reach out to Dr Collins for care f/u on current admission   GI/DVT Proph - Protonix/Lovenox   Resume all other home meds   d/w Rounding Team 3a-Middle 77M PMH: NSCLC Ca Stage 3 not on Chemo, HTN, HLD, DM2 and CKD3a, presents with abdominal pain associated with constipation of 10 days duration. Admitted for pain control and palliative care.     Vital Signs Last 24 Hrs  T(C): 36 (10 Dereck 2023 13:00), Max: 36.3 (09 Jan 2023 16:17)  T(F): 96.8 (10 Dereck 2023 13:00), Max: 97.4 (09 Jan 2023 16:17)  HR: 72 (10 Dereck 2023 13:00) (72 - 98)  BP: 100/51 (10 Dereck 2023 13:00) (100/51 - 112/53)  BP(mean): 77 (10 Dereck 2023 00:19) (77 - 77)  RR: 18 (10 Dereck 2023 13:00) (18 - 18)  SpO2: 100% (10 Dereck 2023 00:19) (100% - 100%)  Parameters below as of 10 Dereck 2023 00:19  Patient On (Oxygen Delivery Method): nasal cannula  O2 Flow (L/min): 2    GEN: NAD / Mumbles but understands discussion (baseline communication?)  CV: NL S1/2  RESP: Decreased BS   GI: +BS Soft NT ND  Ext: No e/c/c   Neuro: mumbled speech unknown baseline     CT A/P/Chest:   IMPRESSION:  1. Since August 28, 2022, no evidence of pulmonary embolus;   redemonstrated are scattered areas of bronchiolectasis and bronchial wall   thickening with areas of mucous plugging, compatible with small airways   disease.  2. Since December 19, 2022, innumerable bilobar hepatic metastases have   overall increased in size and number.  3. Increased bulky upper abdominal and retroperitoneal adenopathy.  4. New small volume abdominopelvic ascites and new trace bilateral   pleural effusions, right greater than left.    Impression: CXR   Right suprahilar opacity. Without difference.  Please see concurrent CT scan of the thorax.                        7.9    13.98 )-----------( 119      ( 10 Dereck 2023 07:05 )             25.0   01-10    135  |  100  |  32<H>  ----------------------------<  100<H>  4.8   |  20  |  1.2    Ca    8.9      10 Dereck 2023 07:05    TPro  6.3  /  Alb  3.4<L>  /  TBili  0.8  /  DBili  x   /  AST  71<H>  /  ALT  33  /  AlkPhos  994<H>  01-10    IMPRESSION:  Progression of NSCLCA (Stage 4 at this time)   Active Smoker   Leukocytosis likely 2/2 malignancy   Dizziness r/o Brain Mets   Suspected Dehydration   CKD3a     Plan:  Check: Orthostatics   Check Brain MRI r/o Brain Mets   Check UA if + send blood and urine cxs and start ceftriaxone 1g iv qd  LR 100cc/hr x1L also to prevent CAROLYN  NEEDS GOC: Palliative Care / Hospice Consult / HemeOnc Consult  May need to arrange family meeting with hospital providers and Dr Collins (known to pt)  Resident to reach out to Dr Collins for care f/u on current admission   GI/DVT Proph - Protonix/Lovenox   Resume all other home meds   d/w Rounding Team 3a-Middle

## 2023-01-09 NOTE — DISCHARGE NOTE NURSING/CASE MANAGEMENT/SOCIAL WORK - PATIENT PORTAL LINK FT
You can access the FollowMyHealth Patient Portal offered by [facility] by registering at the following website: [email or web address]. By joining [facility]’s FollowMyHealth portal, you will also be able to view your health information using other applications (apps) compatible with our system.

## 2023-01-09 NOTE — ED PROVIDER NOTE - OBJECTIVE STATEMENT
77-year-old male past medical history of non-small cell lung cancer stage IV per PET scan in June, hypertension, hyperlipidemia, diabetes with mets to the liver.  Patient was just here 20 days ago with CT scan showing masses in the liver.  Patient presents for abdominal pain x1 day.  Patient denies nausea vomiting, fever.,  Shortness of breath, chest pain

## 2023-01-09 NOTE — ED PROVIDER NOTE - ATTENDING CONTRIBUTION TO CARE
77 year old patient known to have, NSCLC Ca Stage 3 not on Chemo, HTN, HLD, DM and CKD [id number], COPD on 2L NC as needed BIBEMS for 2 days of throbbing epigastric abdominal pain, progressive, firm palpable mass noted. no fevers, vomiting, or diarrhea. patient also endorses mild increased in sob. denies significant chest pain at this time. here with his son.     vss, nontoxic, pale, chronically ill appearing, pink conj, anicteric, MMM, neck supple, wheezing bilaterally, normal work of breathing, RRR, equal radial pulses bilat, abd soft/minimal tenderness noted, palpable firm mass to epigastrium, no cva tend. no calves tend, no fnd. no rashes. 77 year old patient known to have, NSCLC Ca Stage 4 not on Chemo, HTN, HLD, DM and CKD [id number], COPD on 2L NC as needed BIBEMS for 2 days of throbbing epigastric abdominal pain, progressive, firm palpable mass noted. no fevers, vomiting, or diarrhea. patient also endorses mild increased in sob. denies significant chest pain at this time. here with his son.     vss, nontoxic, pale, chronically ill appearing, pink conj, anicteric, MMM, neck supple, wheezing bilaterally, normal work of breathing, RRR, equal radial pulses bilat, abd soft/minimal tenderness noted, palpable firm mass to epigastrium, no cva tend. no calves tend, no fnd. no rashes.

## 2023-01-09 NOTE — H&P ADULT - ASSESSMENT
#HTN   #HLD  - c/w home meds     #DVT - lovenox sq     #GI - pantoprazole     #Diet - DASH    #Activity - IAT     #Code - Full code     #Disposition - IP  77 year old patient known to have, NSCLC Ca Stage 3 not on Chemo, HTN, HLD, DM and CKD [id number], presented to ED for abdominal pain associated with constipation of 10 days duration.   Admitted for pain control and palliative care.     #Abdominal pain and constipation   #Neck pain and dizziness with SOB  # NSCLC Ca Stage 3 with metastasis to liver and bone   not on Chemo; follows with Dr. Avila - not responsive to previous regimen   No nausea, no vomiting and tolerating PO intake.   - Palliative care consult   - Oncology consult  - Pain control PRN     #Leukocytosis likely secondary to steroid use   - WBC 17K   - ni signs of infection   - on chronic dexamethasone for small airways disease   #Anemia Normocytic  - Hb 10.3 MCV 89.9  - Follow up iron studies   - Monitor CBC  - Keep active type and screen    #DM  #CKD stage 3a - stable  #HTN   #HLD  - c/w home meds   - sliding scale   - no need to repeat a1c/lipid profile     #DVT - lovenox sq     #GI - pantoprazole     #Diet - DASH - only eats soups     #Activity - IAT     #Code - Full code     #Disposition - IP  77 year old patient known to have, NSCLC Ca Stage 3 not on Chemo, HTN, HLD, DM and CKD [id number], presented to ED for abdominal pain associated with constipation of 10 days duration.   Admitted for pain control and palliative care.     #Abdominal pain with mass at periumbilical site   #Neck pain and dizziness with SOB  #NSCLC Ca Stage 3 with metastasis to liver and bone   #Bilateral pleural effusion with small airways disease - small volume ascites   not on Chemo; follows with Dr. Avila - not responsive to previous regimen   No nausea, no vomiting and tolerating PO intake - pain is controlled at the moment - no chest pain   WBC 14.3 - Hb 9.1 - MCV 92 -  - AG 15 - crea 1.3 (b/l 1.4) - ALP 1382 - trop 0.03 x1   UA neg - RVP neg   EKG NSR - LAFB   CXR unofficial - diffuse interstitial markings - R lung opacities   CT agnio no PE - small airway disease - bilobar hepatic mets with increasing size - small volume ascites - bilat trace pleural effusion - abdo/retrioperitoneal LN   In the ED given 1L LR   - monitor for signs of fever - worsening wbc   - Palliative care consult  - PT consult    - Oncology consult  - Pain control PRN   - no additional intervention at the moment     #Leukocytosis likely secondary to steroid use   - WBC 17K   - ni signs of infection   - on chronic dexamethasone for small airways disease   #Anemia Normocytic  - Hb 9.2  - Follow up iron studies   - Monitor CBC  - Keep active type and screen    #DM  #CKD stage 3a - stable  #HTN   #HLD  - c/w home meds   - sliding scale   - no need to repeat a1c/lipid profile     #DVT - heparin sq     #GI - pantoprazole     #Diet - DASH - only eats soups     #Activity - IAT     #Code - Full code     #Disposition - IP

## 2023-01-09 NOTE — H&P ADULT - TIME BILLING
verifying history/chart review/imaging review / discussion w/ pt and rounding team and interdisciplinary rounds w/ direct pt care

## 2023-01-09 NOTE — ED PROVIDER NOTE - PROGRESS NOTE DETAILS
Spoke with sons and daughters, wife about CODE STATUS.  As of now patient full code.  Family leaning towards DNR, DNI with home hospice however patient's family would like to discuss with further family members prior to changing CODE STATUS.

## 2023-01-09 NOTE — DISCHARGE NOTE NURSING/CASE MANAGEMENT/SOCIAL WORK - NSDCPEFALRISK_GEN_ALL_CORE
For information on Fall & Injury Prevention, visit: [email or web address] OR  [email or web address] OR  https://www.cdc.gov/steadi/patient.html

## 2023-01-09 NOTE — ED PROVIDER NOTE - DIFFERENTIAL DIAGNOSIS
likely metastatic mass r/o other intra-abdominal pathology  sob r/o PE, wheezing on exam likely 2/2 COPD. +active smoker. Differential Diagnosis

## 2023-01-09 NOTE — H&P ADULT - NSHPPHYSICALEXAM_GEN_ALL_CORE
LOS:     VITALS:   T(C): 36.3 (01-09-23 @ 16:17), Max: 36.3 (01-09-23 @ 16:17)  HR: 98 (01-09-23 @ 16:17) (98 - 98)  BP: 108/52 (01-09-23 @ 16:17) (108/52 - 108/52)  RR: 18 (01-09-23 @ 16:17) (18 - 18)  SpO2: 100% (01-09-23 @ 16:17) (100% - 100%)    GENERAL: NAD, lying in bed comfortably  HEAD:  Atraumatic, Normocephalic  EYES: EOMI, PERRLA, conjunctiva and sclera clear  ENT: Moist mucous membranes  NECK: Supple, No JVD  CHEST/LUNG: Clear to auscultation bilaterally; No rales, rhonchi, wheezing, or rubs. Unlabored respirations  HEART: Regular rate and rhythm; No murmurs, rubs, or gallops  ABDOMEN: BSx4; Soft, nontender, nondistended  EXTREMITIES:  2+ Peripheral Pulses, brisk capillary refill. No clubbing, cyanosis, or edema  NERVOUS SYSTEM:  A&Ox3, no focal deficits   SKIN: No rashes or lesions LOS:     VITALS:   T(C): 36.3 (01-09-23 @ 16:17), Max: 36.3 (01-09-23 @ 16:17)  HR: 98 (01-09-23 @ 16:17) (98 - 98)  BP: 108/52 (01-09-23 @ 16:17) (108/52 - 108/52)  RR: 18 (01-09-23 @ 16:17) (18 - 18)  SpO2: 100% (01-09-23 @ 16:17) (100% - 100%)    GENERAL: NAD, lying in bed comfortably  HEAD:  Atraumatic, Normocephalic  EYES: EOMI, PERRLA, conjunctiva and sclera clear  ENT: Moist mucous membranes  NECK: Supple, No JVD  CHEST/LUNG: Clear to auscultation bilaterally; Diffuse bilateral wheezing. Unlabored respirations  HEART: Regular rate and rhythm; No murmurs, rubs, or gallops  ABDOMEN: BSx4; Soft, nontender, nondistended  EXTREMITIES:  2+ Peripheral Pulses, brisk capillary refill. No clubbing, cyanosis, or edema  NERVOUS SYSTEM:  A&Ox3, no focal deficits   SKIN: No rashes or lesions

## 2023-01-09 NOTE — ED PROVIDER NOTE - PHYSICAL EXAMINATION
CONSTITUTIONAL: Well-appearing; well-nourished; in no apparent distress.   HEAD: Normocephalic; atraumatic.   EYES: PERRL; EOM intact. Conjunctiva normal B/L. aniceteric  ENT: Normal pharynx with no tonsillar hypertrophy.   NECK: Supple; non-tender; no cervical lymphadenopathy.   CHEST: Normal chest excursion with respiration.   CARDIOVASCULAR: Normal S1, S2; no murmurs, rubs, or gallops.   RESPIRATORY: Normal chest excursion with respiration; breath sounds clear and equal bilaterally; no wheezes, rhonchi, or rales.  GI/: Normal bowel sounds; non-distended; non ttp; no ecchymosis; mass appreciated midline  BACK: No evidence of trauma or deformity. Non-tender to palpation. No CVA tenderness.   EXT: Normal ROM in all four extremities; non-tender to palpation; distal pulses are normal. No leg edema B/L.   SKIN: Normal for age and race; warm; dry; good turgor.  NEURO: A & O x 3; CN 2-12 intact. Grossly unremarkable.

## 2023-01-09 NOTE — ED PROVIDER NOTE - CLINICAL SUMMARY MEDICAL DECISION MAKING FREE TEXT BOX
77 year old patient known to have, NSCLC Ca Stage 4 not on Chemo, HTN, HLD, DM and CKD [id number], COPD on 2L NC as needed BIBEMS for 2 days of throbbing epigastric abdominal pain, progressive, firm palpable mass note. Labs with leukocytosis, afebrile. patient with mild troponemia, ekg no stemi. imaging with metastatic mass where patient endorsing pain, new ascites and pleural effusions. discussed with son, advanced directives and patient remains full code. admitted for further management.

## 2023-01-09 NOTE — ED ADULT NURSE REASSESSMENT NOTE - NS ED NURSE REASSESS COMMENT FT1
Assumed care of pt; A&O4, Neg SOB at this time. Pt resting on stretcher, no s/s of distress. Pt pending bed placement. Pt denies any needs at this time. Pt pending palliative care consult at this time. Pt safety and comfort measures in place. Will continue to monitor at this time.

## 2023-01-10 NOTE — PATIENT PROFILE ADULT - FALL HARM RISK - HARM RISK INTERVENTIONS
Assistance with ambulation/Assistance OOB with selected safe patient handling equipment/Communicate Risk of Fall with Harm to all staff/Discuss with provider need for PT consult/Monitor gait and stability/Reinforce activity limits and safety measures with patient and family/Tailored Fall Risk Interventions/Visual Cue: Yellow wristband and red socks/Bed in lowest position, wheels locked, appropriate side rails in place/Call bell, personal items and telephone in reach/Instruct patient to call for assistance before getting out of bed or chair/Non-slip footwear when patient is out of bed/[place] to call system/Physically safe environment - no spills, clutter or unnecessary equipment/Purposeful Proactive Rounding/Room/bathroom lighting operational, light cord in reach

## 2023-01-10 NOTE — CONSULT NOTE ADULT - PROBLEM SELECTOR RECOMMENDATION 9
with mets, new abdominal pain  pending MRI brain  not candidate for further treatment   hospice appropriate if within GOC- will follow up for GOC

## 2023-01-10 NOTE — CONSULT NOTE ADULT - PROBLEM SELECTOR RECOMMENDATION 3
BM yesterday per patient  continue senna 2 tabs QHS  continue Miralax 17 g QQ  PRn nausea meds for nausea

## 2023-01-10 NOTE — CONSULT NOTE ADULT - PROBLEM SELECTOR RECOMMENDATION 2
resolved at time of visit  continue home regimen - Tramadol 50 mg Q 6 H   will adjust pain regimen as needed

## 2023-01-10 NOTE — CONSULT NOTE ADULT - SUBJECTIVE AND OBJECTIVE BOX
CC:     HPI:    77 year old patient known to have, NSCLC Ca Stage 3 not on Chemo, HTN, HLD, DM and CKD [id number], presented to ED for abdominal discomfort associated with new abdominal mass at periumbilical site, Bl pleural effusions, small volume ascites, anemia. Patient is no longer a candidate for cancer treatment and heme onc did recommend CMO in last note- pending brain MRI. Palliative consulted for GOC.     PERTINENT PM/SXH:   HTN (hypertension)    HLD (hyperlipidemia)    Type 2 diabetes mellitus    NSCLC metastatic to liver      No significant past surgical history      FAMILY HISTORY:    ITEMS NOT CHECKED ARE NOT PRESENT    SOCIAL HISTORY:   Significant other/partner[ X]  Children[ X]  Jain/Spirituality:  Substance hx:  [ ]   Tobacco hx:  [ ]   Alcohol hx: [ ]   Living Situation: [ X]Home  [ ]Long term care  [ ]Rehab [ ]Other  Home Services: [ ] HHA [ ] Visting RN [ ] Hospice  Occupation:  Home Opioid hx:  [ ] Y [ ] N [X ] I-Stop Reference No: This report was requested by: Kiara Lamar | Reference #: [id number]    There are no results for the search terms that you entered.    ADVANCE DIRECTIVES:    [ X] Full Code [ ] DNR  MOLST  [ ]  Living Will  [ ]   DECISION MAKER(s): Patient   [ ] Health Care Proxy(s)  [ X] Surrogate(s)  [ ] Guardian           Name(s): Phone Number(s): Spouse- Refija, Naser- son     BASELINE (I)ADL(s) (prior to admission):  Hyde: [ ]Total  [ X ] Moderate [ ]Dependent  Palliative Performance Status Version 2:        60 %    http://npcrc.org/files/news/palliative_performance_scale_[email or web address].pdf    Allergies    No Known Allergies    Intolerances    MEDICATIONS  (STANDING):  aspirin enteric coated 81 milliGRAM(s) Oral daily  atorvastatin 40 milliGRAM(s) Oral at bedtime  cefTRIAXone   IVPB 1000 milliGRAM(s) IV Intermittent every 24 hours  cholestyramine Powder (Sugar-Free) 4 Gram(s) Oral three times a day  dexAMETHasone     Tablet 1 milliGRAM(s) Oral daily  dronabinol 2.5 milliGRAM(s) Oral two times a day  famotidine    Tablet 20 milliGRAM(s) Oral two times a day  heparin   Injectable 5000 Unit(s) SubCutaneous every 12 hours  influenza  Vaccine (HIGH DOSE) 0.7 milliLiter(s) IntraMuscular once  lactated ringers. 1000 milliLiter(s) (100 mL/Hr) IV Continuous <Continuous>  lisinopril 5 milliGRAM(s) Oral daily  montelukast 10 milliGRAM(s) Oral daily  nicotine -   7 mG/24Hr(s) Patch 1 Patch Transdermal daily  polyethylene glycol 3350 17 Gram(s) Oral two times a day  prochlorperazine   Tablet 10 milliGRAM(s) Oral every 6 hours  senna 2 Tablet(s) Oral at bedtime  traMADol 50 milliGRAM(s) Oral every 6 hours    MEDICATIONS  (PRN):  acetaminophen     Tablet .. 650 milliGRAM(s) Oral every 6 hours PRN Temp greater or equal to 38C (100.4F), Mild Pain (1 - 3)  aluminum hydroxide/magnesium hydroxide/simethicone Suspension 30 milliLiter(s) Oral every 4 hours PRN Dyspepsia  melatonin 3 milliGRAM(s) Oral at bedtime PRN Insomnia  ondansetron Injectable 4 milliGRAM(s) IV Push every 8 hours PRN Nausea and/or Vomiting    PRESENT SYMPTOMS:   Pain: [ ]yes [X ]no  * has hx of pain in his knee and his Rt thigh but no pain right now.   QOL impact -   Location -                    Aggravating factors -  Quality -  Radiation -  Timing-  Severity (0-10 scale):  Minimal acceptable level (0-10 scale):     Dyspnea:                           [ ]Mild [ ]Moderate [ ]Severe [ X]None  Anxiety:                             [ ]Mild [ ]Moderate [ ]Severe [X ]None  Fatigue:                             [ ]Mild [ ]Moderate [ ]Severe [X ]None  Nausea:                             [ ]Mild [ ]Moderate [ ]Severe [ X]None  Loss of appetite:              [ ]Mild [ ]Moderate [ ]Severe [ X]None  Constipation:                    [ ]Mild [ ]Moderate [ ]Severe [ X]None    Other Symptoms:  + weakness  [ X]All other review of systems negative     Palliative Performance Status Version 2:        60 %    http://npcrc.org/files/news/palliative_performance_scale_ppsv2.pdf    PHYSICAL EXAM:  Vital Signs Last 24 Hrs  T(C): 36 (10 Dereck 2023 13:00), Max: 36.1 (10 Dereck 2023 00:19)  T(F): 96.8 (10 Dereck 2023 13:00), Max: 96.9 (10 Dereck 2023 00:19)  HR: 72 (10 Dereck 2023 13:00) (72 - 91)  BP: 100/51 (10 Dereck 2023 13:00) (100/51 - 112/53)  BP(mean): 77 (10 Dereck 2023 00:19) (77 - 77)  RR: 18 (10 Dereck 2023 13:00) (18 - 18)  SpO2: 100% (10 Dereck 2023 00:19) (100% - 100%)    GENERAL:  [X ] No acute distress [ ]Lethargic  [ ]Unarousable  [X ]Verbal  [ ]Non-Verbal [ ]Cachexia    BEHAVIORAL/PSYCH:  [ X]Alert and Oriented x 3  [ ] Anxiety [ ] Delirium [ ] Agitation [X ] Calm   EYES: [X ] No scleral icterus [ ] Scleral icterus [ ] Closed  ENMT:  [ X]Dry mouth  [ ]No external oral lesions [ ] No external ear or nose lesions  PULMONARY:  [ ]Tachypnea  [ ]Audible excessive secretions [X ] No labored breathing [ ] labored breathing  GASTROINTESTINAL: [X ]Soft  [ ]Distended  [ ]Not distended [ ]Non tender [ ]Tender  MUSCULOSKELETAL: [ X]No clubbing [ ] clubbing  [ X] No cyanosis [ ] cyanosis  NEUROLOGIC: [ X]No focal deficits  [ X]Follows commands  [ ]Does not follow commands  [ ]Cognitive impairment  [ ]Dysphagia  [ ]Dysarthria  [ ]Paresis   SKIN: [ ] Jaundiced [X ] Non-jaundiced [ ]Rash [X ]No Rash [ ] Warm [ ] Dry  MISC/LINES: [ ] ET tube [ ] Trach [ ]NGT/OGT [ ]PEG [ ]Cunha    LABS: reviewed by me                        7.9    13.98 )-----------( 119      ( 10 Dereck 2023 07:05 )             25.0   01-10    135  |  100  |  32<H>  ----------------------------<  100<H>  4.8   |  20  |  1.2    Ca    8.9      10 Dereck 2023 07:05    TPro  6.3  /  Alb  3.4<L>  /  TBili  0.8  /  DBili  x   /  AST  71<H>  /  ALT  33  /  AlkPhos  994<H>  01-10        RADIOLOGY & ADDITIONAL STUDIES: reviewed by me    EKG: reviewed by me    < from: 12 Lead ECG (01.09.23 @ 16:56) >  Ventricular Rate 98 BPM    Atrial Rate 98 BPM    P-R Interval 238 ms    QRS Duration 110 ms    Q-T Interval 364 ms    QTC Calculation(Bazett) 464 ms    P Axis 73 degrees    R Axis -58 degrees    T Axis 97 degrees    Diagnosis Line Sinus rhythm [id number] degree A-V block  Left anterior fascicular block  ST & T wave abnormality, consider lateral ischemia  Abnormal ECG    Confirmed by SHEKHAR OROZCO MD ([id number]) on 1/10/2023 6:58:23 AM    < end of copied text >    < from: CT Angio Chest PE Protocol w/ IV Cont (01.09.23 @ 17:16) >  IMPRESSION:    1. Since August 28, 2022, no evidence of pulmonary embolus;   redemonstrated are scattered areas of bronchiolectasis and bronchial wall   thickening with areas of mucous plugging, compatible with small airways   disease.    2. Since December 19, 2022, innumerable bilobar hepatic metastases have   overall increased in size and number.    3. Increased bulky upper abdominal and retroperitoneal adenopathy.    4. New small volume abdominopelvic ascites and new trace bilateral   pleural effusions, right greater than left.    --- End of Report ---    < end of copied text >        Goals of Care Document:   - LM for son on voicemail to call back for family meeting set up    CC: abdominal pain    HPI:    77 year old patient known to have, NSCLC Ca Stage 3 not on Chemo, HTN, HLD, DM and CKD [id number], presented to ED for abdominal discomfort associated with new abdominal mass at periumbilical site, Bl pleural effusions, small volume ascites, anemia. Patient is no longer a candidate for cancer treatment and heme onc did recommend CMO in last note- pending brain MRI. Palliative consulted for GOC.     PERTINENT PM/SXH:   HTN (hypertension)    HLD (hyperlipidemia)    Type 2 diabetes mellitus    NSCLC metastatic to liver      No significant past surgical history      FAMILY HISTORY:  Unable to obtain from patient    ITEMS NOT CHECKED ARE NOT PRESENT    SOCIAL HISTORY:   Significant other/partner[ X]  Children[ X]  Worship/Spirituality:  Substance hx:  [ ]   Tobacco hx:  [ ]   Alcohol hx: [ ]   Living Situation: [ X]Home  [ ]Long term care  [ ]Rehab [ ]Other  Home Services: [ ] HHA [ ] Visting RN [ ] Hospice  Occupation:  Home Opioid hx:  [ ] Y [ ] N [X ] I-Stop Reference No: This report was requested by: Kiara Lamar | Reference #: [id number]    There are no results for the search terms that you entered.    ADVANCE DIRECTIVES:    [ X] Full Code [ ] DNR  MOLST  [ ]  Living Will  [ ]   DECISION MAKER(s): Patient   [ ] Health Care Proxy(s)  [ X] Surrogate(s)  [ ] Guardian           Name(s): Phone Number(s): Spouse- Refija, Naser- son     BASELINE (I)ADL(s) (prior to admission):  Urania: [ ]Total  [ X ] Moderate [ ]Dependent  Palliative Performance Status Version 2:        60 %    http://npcrc.org/files/news/palliative_performance_scale_[email or web address].pdf    Allergies  No Known Allergies      MEDICATIONS  (STANDING):  aspirin enteric coated 81 milliGRAM(s) Oral daily  atorvastatin 40 milliGRAM(s) Oral at bedtime  cefTRIAXone   IVPB 1000 milliGRAM(s) IV Intermittent every 24 hours  cholestyramine Powder (Sugar-Free) 4 Gram(s) Oral three times a day  dexAMETHasone     Tablet 1 milliGRAM(s) Oral daily  dronabinol 2.5 milliGRAM(s) Oral two times a day  famotidine    Tablet 20 milliGRAM(s) Oral two times a day  heparin   Injectable 5000 Unit(s) SubCutaneous every 12 hours  influenza  Vaccine (HIGH DOSE) 0.7 milliLiter(s) IntraMuscular once  lactated ringers. 1000 milliLiter(s) (100 mL/Hr) IV Continuous <Continuous>  lisinopril 5 milliGRAM(s) Oral daily  montelukast 10 milliGRAM(s) Oral daily  nicotine -   7 mG/24Hr(s) Patch 1 Patch Transdermal daily  polyethylene glycol 3350 17 Gram(s) Oral two times a day  prochlorperazine   Tablet 10 milliGRAM(s) Oral every 6 hours  senna 2 Tablet(s) Oral at bedtime  traMADol 50 milliGRAM(s) Oral every 6 hours    MEDICATIONS  (PRN):  acetaminophen     Tablet .. 650 milliGRAM(s) Oral every 6 hours PRN Temp greater or equal to 38C (100.4F), Mild Pain (1 - 3)  aluminum hydroxide/magnesium hydroxide/simethicone Suspension 30 milliLiter(s) Oral every 4 hours PRN Dyspepsia  melatonin 3 milliGRAM(s) Oral at bedtime PRN Insomnia  ondansetron Injectable 4 milliGRAM(s) IV Push every 8 hours PRN Nausea and/or Vomiting    PRESENT SYMPTOMS:   Pain: [ ]yes [X ]no  * has hx of pain in his knee and his Rt thigh but no pain right now.   QOL impact -   Location -                    Aggravating factors -  Quality -  Radiation -  Timing-  Severity (0-10 scale):  Minimal acceptable level (0-10 scale):     Dyspnea:                           [ ]Mild [ ]Moderate [ ]Severe [ X]None  Anxiety:                             [ ]Mild [ ]Moderate [ ]Severe [X ]None  Fatigue:                             [ ]Mild [ ]Moderate [ ]Severe [X ]None  Nausea:                             [ ]Mild [ ]Moderate [ ]Severe [ X]None  Loss of appetite:              [ ]Mild [ ]Moderate [ ]Severe [ X]None  Constipation:                    [ ]Mild [ ]Moderate [ ]Severe [ X]None    Other Symptoms:  + weakness  [ X]All other review of systems negative     Palliative Performance Status Version 2:        60 %    http://npcrc.org/files/news/palliative_performance_scale_[email or web address].pdf    PHYSICAL EXAM:  Vital Signs Last 24 Hrs  T(C): 36 (10 Dereck 2023 13:00), Max: 36.1 (10 Dereck 2023 00:19)  T(F): 96.8 (10 Dereck 2023 13:00), Max: 96.9 (10 Dereck 2023 00:19)  HR: 72 (10 Dereck 2023 13:00) (72 - 91)  BP: 100/51 (10 Dereck 2023 13:00) (100/51 - 112/53)  BP(mean): 77 (10 Dereck 2023 00:19) (77 - 77)  RR: 18 (10 Dereck 2023 13:00) (18 - 18)  SpO2: 100% (10 Dereck 2023 00:19) (100% - 100%)    GENERAL:  [X ] No acute distress [ ]Lethargic  [ ]Unarousable  [X ]Verbal  [ ]Non-Verbal [ ]Cachexia    BEHAVIORAL/PSYCH:  [ X]Alert and Oriented x 3  [ ] Anxiety [ ] Delirium [ ] Agitation [X ] Calm   EYES: [X ] No scleral icterus [ ] Scleral icterus [ ] Closed  ENMT:  [ X]Dry mouth  [ ]No external oral lesions [ ] No external ear or nose lesions  PULMONARY:  [ ]Tachypnea  [ ]Audible excessive secretions [X ] No labored breathing [ ] labored breathing  GASTROINTESTINAL: [X ]Soft  [ ]Distended  [ ]Not distended [ ]Non tender [ ]Tender  MUSCULOSKELETAL: [ X]No clubbing [ ] clubbing  [ X] No cyanosis [ ] cyanosis  NEUROLOGIC: [ X]No focal deficits  [ X]Follows commands  [ ]Does not follow commands  [ ]Cognitive impairment  [ ]Dysphagia  [ ]Dysarthria  [ ]Paresis   SKIN: [ ] Jaundiced [X ] Non-jaundiced [ ]Rash [X ]No Rash [ ] Warm [ ] Dry  MISC/LINES: [ ] ET tube [ ] Trach [ ]NGT/OGT [ ]PEG [ ]Cunha    LABS: reviewed by me                        7.9    13.98 )-----------( 119      ( 10 Dereck 2023 07:05 )             25.0   01-10    135  |  100  |  32<H>  ----------------------------<  100<H>  4.8   |  20  |  1.2    Ca    8.9      10 Dereck 2023 07:05    TPro  6.3  /  Alb  3.4<L>  /  TBili  0.8  /  DBili  x   /  AST  71<H>  /  ALT  33  /  AlkPhos  994<H>  01-10        RADIOLOGY & ADDITIONAL STUDIES: reviewed by me    EKG: reviewed by me    < from: 12 Lead ECG (01.09.23 @ 16:56) >  Ventricular Rate 98 BPM    Atrial Rate 98 BPM    P-R Interval 238 ms    QRS Duration 110 ms    Q-T Interval 364 ms    QTC Calculation(Bazett) 464 ms    P Axis 73 degrees    R Axis -58 degrees    T Axis 97 degrees    Diagnosis Line Sinus rhythm [id number] degree A-V block  Left anterior fascicular block  ST & T wave abnormality, consider lateral ischemia  Abnormal ECG    Confirmed by SHEKHAR OROZCO MD ([id number]) on 1/10/2023 6:58:23 AM    < end of copied text >    < from: CT Angio Chest PE Protocol w/ IV Cont (01.09.23 @ 17:16) >  IMPRESSION:    1. Since August 28, 2022, no evidence of pulmonary embolus;   redemonstrated are scattered areas of bronchiolectasis and bronchial wall   thickening with areas of mucous plugging, compatible with small airways   disease.    2. Since December 19, 2022, innumerable bilobar hepatic metastases have   overall increased in size and number.    3. Increased bulky upper abdominal and retroperitoneal adenopathy.    4. New small volume abdominopelvic ascites and new trace bilateral   pleural effusions, right greater than left.    --- End of Report ---    < end of copied text >        Goals of Care Document:   - LM for son on voicemail to call back for family meeting set up

## 2023-01-10 NOTE — CONSULT NOTE ADULT - NS ATTEND AMEND GEN_ALL_CORE FT
77 year old man with history of NSCLC Ca Stage 3 no longer receiving treatemnt, CKD presented with abdominal pain in the setting of new abdominal mass, pleural effusion, anemia. associated with new abdominal mass at periumbilical site, bilateral pleural effusions, small volume ascites. Palliative care consulted for GOC.    Patient reportedly no longer a candidate for cancer directed therapy. Plan for GOC discussion with family when family can be contacted.  Will be available

## 2023-01-10 NOTE — PROGRESS NOTE ADULT - SUBJECTIVE AND OBJECTIVE BOX
NAHED CASTANEDA 77y Male  MRN#: [id number]   CODE STATUS: full    Hospital Day: 1d    Pt is currently admitted with the primary diagnosis of abd pain    SUBJECTIVE  Hospital Course    Overnight events: No acute events overnight. On miralax and senna    Subjective complaints: Abd pain better,                                               ----------------------------------------------------------  OBJECTIVE  PAST MEDICAL & SURGICAL HISTORY  HTN (hypertension)    HLD (hyperlipidemia)    Type 2 diabetes mellitus    NSCLC metastatic to liver    No significant past surgical history                                              -----------------------------------------------------------  ALLERGIES:  No Known Allergies                                            ------------------------------------------------------------    HOME MEDICATIONS  Home Medications:  atorvastatin 40 mg oral tablet: 1 tab(s) orally once a day (09 Jan 2023 21:16)  cholestyramine 4 g/4.8 g oral powder for reconstitution: 1 application orally 3 times a day (09 Jan 2023 21:16)  dexamethasone 1 mg oral tablet: 1 tab(s) orally once a day (09 Jan 2023 21:16)  Dronabinol 2.5 MG Oral Capsule:  (09 Jan 2023 21:16)  famotidine 20 mg oral tablet: 1 tab(s) orally 2 times a day (09 Jan 2023 21:16)  lisinopril 5 mg oral tablet: 1 tab(s) orally once a day (09 Jan 2023 21:16)  metFORMIN 500 mg oral tablet: 1 tab(s) orally 2 times a day (09 Jan 2023 21:16)  Nitrofurantoin Macrocrystal 100 MG Oral Capsule:  (09 Jan 2023 21:16)  Ondansetron HCl - 8 MG Oral Tablet:  (09 Jan 2023 21:16)  Prochlorperazine Maleate 10 MG Oral Tablet:  (09 Jan 2023 21:16)  Singulair 10 mg oral tablet: 1 tab(s) orally once a day (09 Jan 2023 21:16)  traMADol HCl - 50 MG Oral Tablet:  (09 Jan 2023 21:16)                           MEDICATIONS:  STANDING MEDICATIONS  aspirin enteric coated 81 milliGRAM(s) Oral daily  atorvastatin 40 milliGRAM(s) Oral at bedtime  cholestyramine Powder (Sugar-Free) 4 Gram(s) Oral three times a day  dexAMETHasone     Tablet 1 milliGRAM(s) Oral daily  dronabinol 2.5 milliGRAM(s) Oral two times a day  famotidine    Tablet 20 milliGRAM(s) Oral two times a day  heparin   Injectable 5000 Unit(s) SubCutaneous every 12 hours  influenza  Vaccine (HIGH DOSE) 0.7 milliLiter(s) IntraMuscular once  lisinopril 5 milliGRAM(s) Oral daily  montelukast 10 milliGRAM(s) Oral daily  nicotine -   7 mG/24Hr(s) Patch 1 Patch Transdermal daily  polyethylene glycol 3350 17 Gram(s) Oral two times a day  prochlorperazine   Tablet 10 milliGRAM(s) Oral every 6 hours  senna 2 Tablet(s) Oral at bedtime  traMADol 50 milliGRAM(s) Oral every 6 hours    PRN MEDICATIONS  acetaminophen     Tablet .. 650 milliGRAM(s) Oral every 6 hours PRN  aluminum hydroxide/magnesium hydroxide/simethicone Suspension 30 milliLiter(s) Oral every 4 hours PRN  melatonin 3 milliGRAM(s) Oral at bedtime PRN  ondansetron Injectable 4 milliGRAM(s) IV Push every 8 hours PRN                                            ------------------------------------------------------------  VITAL SIGNS: Last 24 Hours  T(C): 36 (10 Dereck 2023 05:00), Max: 36.3 (09 Jan 2023 16:17)  T(F): 96.8 (10 Dereck 2023 05:00), Max: 97.4 (09 Jan 2023 16:17)  HR: 83 (10 Dereck 2023 05:00) (83 - 98)  BP: 111/56 (10 Dereck 2023 05:00) (108/52 - 112/53)  BP(mean): 77 (10 Dereck 2023 00:19) (77 - 77)  RR: 18 (10 Dereck 2023 05:00) (18 - 18)  SpO2: 100% (10 Dereck 2023 00:19) (100% - 100%)      01-10-23 @ 07:01  -  01-10-23 @ 13:20  --------------------------------------------------------  IN: 200 mL / OUT: 0 mL / NET: 200 mL                                             --------------------------------------------------------------  LABS:                        7.9    13.98 )-----------( 119      ( 10 Dereck 2023 07:05 )             25.0     01-10    135  |  100  |  32<H>  ----------------------------<  100<H>  4.8   |  20  |  1.2    Ca    8.9      10 Dereck 2023 07:05    TPro  6.3  /  Alb  3.4<L>  /  TBili  0.8  /  DBili  x   /  AST  71<H>  /  ALT  33  /  AlkPhos  994<H>  01-10          Troponin T, Serum: 0.02 ng/mL *H* (01-10-23 @ 07:05)  Troponin T, Serum: 0.03 ng/mL *HH* (01-09-23 @ 16:17)          CARDIAC MARKERS ( 10 Dereck 2023 07:05 )  x     / 0.02 ng/mL / x     / x     / x      CARDIAC MARKERS ( 09 Jan 2023 16:17 )  x     / 0.03 ng/mL / x     / x     / x                                                  -------------------------------------------------------------  RADIOLOGY:                                            --------------------------------------------------------------    PHYSICAL EXAM:  General: Well appearing, not in acute distress  HEENT: No cervical LAD, or JVD  LUNGS: CTAB, no rales, on RA  HEART: RRR, no murmur  ABDOMEN: NBS, soft, nontender to palpation  EXT: no peripheral pitting edema b/l  NEURO: AAOx3  SKIN: No skin rash, open wounds                                           --------------------------------------------------------------

## 2023-01-11 NOTE — CHART NOTE - NSCHARTNOTEFT_GEN_A_CORE
PALLIATIVE MEDICINE INTERDISCIPLINARY TEAM NOTE    Provider:                                         [ x  ] Initial visit        Met with: [ x  ] Patient  [   ] Family  [   ] Other:    Primary Language: [ x  ] English [   ] Other*:                      *Interpretation provided by:    SUPPORT DIAGNOSES            (Check all that apply)    [   ] EOL issues  [ x  ] Advanced Illness  [   ] Cultural / spiritual concerns  [   ] Pain / suffering  [   ] Dementia / AMS  [   ] Other:  [   ] AD issues  [   ] Grief / loss / sadness  [   ] Discharge issues  [ x  ] Distress / coping    PSYCHOSOCIAL ASSESSMENT OF PATIENT         (Check all that apply)    [ x  ] Initial Assessment            [   ] Reassessment          [   ] Not Applicable this visit    Pain/suffering acuity:  [  x ] None to mild (0-3)           [   ] Moderate (4-6)        [   ] High (7-10)    Mental Status:  [   x] Alert/oriented (x3)          [   ] Confused/Altered(x2/x1)         [   ] Non-resp    Functional status:  [   ] Independent w ADLs      [ x  ] Needs Assistance             [   ] Bedbound/Full Care    Coping:  [  x ] Coping well                     [   ] Coping w/difficulty            [   ] Poor coping    Support system:  [   ] Strong                              [ x  ] Adequate                        [   ] Inadequate      Past history and medications for:     [ ] Anxiety       [ ] Depression    [ ] Sleep disorders       SERVICE PROVIDED  [   ]Discharge support / facilitation  [   ]AD / goals of care counseling                                  [   ]EOL / death / bereavement counseling  [  x ]Counseling / support                                                [   ] Family meeting  [   ]Prayer / sacrament / ritual                                      [   ] Referral   [   ]Other                                                                       NOTE and Plan of Care (PoC): patient is a 78 y/o M with pmhx of NSCLC cancer, HTN, HLD, DM, CKD presenting for abdominal discomfort associated with new abdominal mass, BL pleural effusions, small volume ascites, and anemia. visited patient earlier today. patient encountered resting comfortably in bed. he denied any pain or discomfort. reviewed role of palliative care SW with patient. he has support from his son and family. support rendered. will follow. [phone]

## 2023-01-11 NOTE — PHYSICAL THERAPY INITIAL EVALUATION ADULT - ADDITIONAL COMMENTS
pt has a Rollator at home and shower chair pt has a Rollator at home and shower chair. Pt and daughter report pt has been experiencing dizziness/lightheadness for few month now, report he had fallen multiple times at home and did not know how he fell.

## 2023-01-11 NOTE — PROGRESS NOTE ADULT - SUBJECTIVE AND OBJECTIVE BOX
HPI:    77 year old patient known to have, NSCLC Ca Stage 3 not on Chemo, HTN, HLD, DM and CKD [id number], presented to ED for abdominal discomfort associated with new abdominal mass at periumbilical site, Bl pleural effusions, small volume ascites, anemia. Patient is no longer a candidate for cancer treatment and heme onc did recommend CMO in last note- pending brain MRI. Palliative consulted for GOC.     INTERVAL EVENTS:  - patient complaining of dizziness which was worse in the chair and with transfer, but feels better now that hes in bed. he also complains of dyspnea. denies pain  - spoke with son Skip on phone today     ADVANCE DIRECTIVES:    MOLST  [ ]  Living Will  [ ]   DECISION MAKER(s):  [ ] Health Care Proxy(s)  [X ] Surrogate(s)  [ ] Guardian           Name(s): Phone Number(s): Wife and son     BASELINE (I)ADL(s) (prior to admission):  [place]: [ ]Total  [ X] Moderate [ ]Dependent  Palliative Performance Status Version 2:       60  %    http://npcrc.org/files/news/palliative_performance_scale_ppsv2.pdf    Allergies    No Known Allergies    Intolerances    MEDICATIONS  (STANDING):  aspirin enteric coated 81 milliGRAM(s) Oral daily  atorvastatin 40 milliGRAM(s) Oral at bedtime  cefTRIAXone   IVPB 1000 milliGRAM(s) IV Intermittent every 24 hours  cholestyramine Powder (Sugar-Free) 4 Gram(s) Oral three times a day  dexAMETHasone     Tablet 1 milliGRAM(s) Oral daily  dronabinol 2.5 milliGRAM(s) Oral two times a day  famotidine    Tablet 20 milliGRAM(s) Oral two times a day  heparin   Injectable 5000 Unit(s) SubCutaneous every 12 hours  influenza  Vaccine (HIGH DOSE) 0.7 milliLiter(s) IntraMuscular once  lactated ringers. 1000 milliLiter(s) (100 mL/Hr) IV Continuous <Continuous>  lisinopril 5 milliGRAM(s) Oral daily  montelukast 10 milliGRAM(s) Oral daily  nicotine -   7 mG/24Hr(s) Patch 1 Patch Transdermal daily  polyethylene glycol 3350 17 Gram(s) Oral two times a day  prochlorperazine   Tablet 10 milliGRAM(s) Oral every 6 hours  senna 2 Tablet(s) Oral at bedtime  traMADol 50 milliGRAM(s) Oral every 6 hours    MEDICATIONS  (PRN):  acetaminophen     Tablet .. 650 milliGRAM(s) Oral every 6 hours PRN Temp greater or equal to 38C (100.4F), Mild Pain (1 - 3)  aluminum hydroxide/magnesium hydroxide/simethicone Suspension 30 milliLiter(s) Oral every 4 hours PRN Dyspepsia  melatonin 3 milliGRAM(s) Oral at bedtime PRN Insomnia  ondansetron Injectable 4 milliGRAM(s) IV Push every 8 hours PRN Nausea and/or Vomiting    PRESENT SYMPTOMS:  Pain: [ ]yes [X ]no  QOL impact -   Location -                    Aggravating factors -  Quality -  Radiation -  Timing-  Severity (0-10 scale):  Minimal acceptable level (0-10 scale):    Dyspnea:                           [X ]Mild [ ]Moderate [ ]Severe  Anxiety:                             [ ]Mild [ ]Moderate [ ]Severe  Fatigue:                             [ X]Mild [ ]Moderate [ ]Severe  Nausea:                             [ ]Mild [ ]Moderate [ ]Severe  Loss of appetite:              [ ]Mild [ ]Moderate [ ]Severe  Constipation:                    [ ]Mild [ ]Moderate [ ]Severe    Other Symptoms:  + dizziness   [X ]All other review of systems negative     Palliative Performance Status Version 2:      40   %    http://npcrc.org/files/news/palliative_performance_scale_ppsv2.pdf    PHYSICAL EXAM:  Vital Signs Last 24 Hrs  T(C): 36.7 (11 Jan 2023 10:41), Max: 36.7 (11 Jan 2023 10:41)  T(F): 98.1 (11 Jan 2023 10:41), Max: 98.1 (11 Jan 2023 10:41)  HR: 88 (11 Jan 2023 10:41) (72 - 91)  BP: 91/51 (11 Jan 2023 10:41) (91/51 - 101/51)  RR: 17 (11 Jan 2023 10:41) (17 - 18)  SpO2: 95% (11 Jan 2023 10:41) (95% - 100%)    GENERAL:  [X ]Alert  [ X]Oriented x  3  [ ]Lethargic  [ ]Cachexia  [ ]Unarousable  [ X]Verbal  [ ]Non-Verbal  Behavioral:   [ ] Anxiety  [ ] Delirium [ ] Agitation [X ] Calm  HEENT:  [ ]Normal   [ X]Dry mouth   [ ]ET Tube/Trach  [ ]Oral lesions  PULMONARY:   [ ]Clear [ X] No Tachypnea  [ ]Audible excessive secretions   [ ]Rhonchi        [ ]Right [ ]Left [ ]Bilateral  [ ]Crackles        [ ]Right [ ]Left [ ]Bilateral  [ ]Wheezing     [ ]Right [ ]Left [ ]Bilateral  [ ]Diminished breath sounds [ ]right [ ]left [ ]bilateral  GASTROINTESTINAL:  [X]Soft  [ ]Distended   [ ]+BS  [ ]Non tender [ ]Tender  [ ]PEG [ ]OGT/ NGT  Last BM:   GENITOURINARY:  MUSCULOSKELETAL:   [ ]Normal   [X ]Weakness  [ ]Bed/Wheelchair bound [ ]Edema  NEUROLOGIC:   [X ]No focal deficits  [ ]Cognitive impairment  [ ]Dysphagia [ ]Dysarthria [ ]Paresis [ ]Other   SKIN:   [X ]Normal    [ ]Rash  [ ]Pressure ulcer(s)       Present on admission [ ]y [ ]n      LABS:                        7.9    13.60 )-----------( 116      ( 11 Jan 2023 08:14 )             26.0   01-11    132<L>  |  98  |  43<H>  ----------------------------<  128<H>  4.8   |  21  |  1.8<H>    Ca    8.7      11 Jan 2023 08:14  Mg     2.2     01-11    TPro  6.0  /  Alb  3.2<L>  /  TBili  0.6  /  DBili  x   /  AST  65<H>  /  ALT  32  /  AlkPhos  893<H>  01-11      Urinalysis Basic - ( 10 Dereck 2023 20:14 )    Color: Yellow / Appearance: Clear / SG: >1.050 / pH: x  Gluc: x / Ketone: Negative  / Bili: Negative / Urobili: 3 mg/dL   Blood: x / Protein: 30 mg/dL / Nitrite: Negative   Leuk Esterase: Negative / RBC: 100 /HPF / WBC 4 /HPF   Sq Epi: x / Non Sq Epi: 0 /HPF / Bacteria: Negative      RADIOLOGY & ADDITIONAL STUDIES:    < from: Xray Chest 1 View- PORTABLE-Urgent (01.09.23 @ 18:58) >  Impression:    Right suprahilar opacity. Without difference.    Please see concurrent CT scan of the thorax.        < end of copied text >    < from: CT Angio Chest PE Protocol w/ IV Cont (01.09.23 @ 17:16) >    IMPRESSION:    1. Since August 28, 2022, no evidence of pulmonary embolus;   redemonstrated are scattered areas of bronchiolectasis and bronchial wall   thickening with areas of mucous plugging, compatible with small airways   disease.    2. Since December 19, 2022, innumerable bilobar hepatic metastases have   overall increased in size and number.    3. Increased bulky upper abdominal and retroperitoneal adenopathy.    4. New small volume abdominopelvic ascites and new trace bilateral   pleural effusions, right greater than left.    --- End of Report ---    < end of copied text >    < from: 12 Lead ECG (01.09.23 @ 16:56) >    Ventricular Rate 98 BPM    Atrial Rate 98 BPM    P-R Interval 238 ms    QRS Duration 110 ms    Q-T Interval 364 ms    QTC Calculation(Bazett) 464 ms    P Axis 73 degrees    R Axis -58 degrees    T Axis 97 degrees    Diagnosis Line Sinus rhythm [id number] degree A-V block  Left anterior fascicular block  ST & T wave abnormality, consider lateral ischemia  Abnormal ECG    Confirmed by SHEKHAR OROZCO MD ([id number]) on 1/10/2023 6:58:23 AM    < end of copied text >      Goals of Care Document:   - spoke with patient and son  - plan for family meeting between 2 and 3 tomorrow afternoon- will discuss advance directives and hospice as an option          HPI:    77 year old patient known to have, NSCLC Ca Stage 3 not on Chemo, HTN, HLD, DM and CKD [id number], presented to ED for abdominal discomfort associated with new abdominal mass at periumbilical site, Bl pleural effusions, small volume ascites, anemia. Patient is no longer a candidate for cancer treatment and heme onc did recommend CMO in last note- pending brain MRI. Palliative consulted for GOC.     INTERVAL EVENTS:  - patient complaining of dizziness which was worse in the chair and with transfer, but feels better now that hes in bed. he also complains of dyspnea. denies pain  - spoke with son Skip on phone today     ADVANCE DIRECTIVES:    MOLST  [ ]  Living Will  [ ]   DECISION MAKER(s):  [ ] Health Care Proxy(s)  [X ] Surrogate(s)  [ ] Guardian           Name(s): Phone Number(s): Wife and son     BASELINE (I)ADL(s) (prior to admission):  [place]: [ ]Total  [ X] Moderate [ ]Dependent  Palliative Performance Status Version 2:       60  %    http://npcrc.org/files/news/palliative_performance_scale_[email or web address].pdf    Allergies    No Known Allergies    Intolerances    MEDICATIONS  (STANDING):  aspirin enteric coated 81 milliGRAM(s) Oral daily  atorvastatin 40 milliGRAM(s) Oral at bedtime  cefTRIAXone   IVPB 1000 milliGRAM(s) IV Intermittent every 24 hours  cholestyramine Powder (Sugar-Free) 4 Gram(s) Oral three times a day  dexAMETHasone     Tablet 1 milliGRAM(s) Oral daily  dronabinol 2.5 milliGRAM(s) Oral two times a day  famotidine    Tablet 20 milliGRAM(s) Oral two times a day  heparin   Injectable 5000 Unit(s) SubCutaneous every 12 hours  influenza  Vaccine (HIGH DOSE) 0.7 milliLiter(s) IntraMuscular once  lactated ringers. 1000 milliLiter(s) (100 mL/Hr) IV Continuous <Continuous>  lisinopril 5 milliGRAM(s) Oral daily  montelukast 10 milliGRAM(s) Oral daily  nicotine -   7 mG/24Hr(s) Patch 1 Patch Transdermal daily  polyethylene glycol 3350 17 Gram(s) Oral two times a day  prochlorperazine   Tablet 10 milliGRAM(s) Oral every 6 hours  senna 2 Tablet(s) Oral at bedtime  traMADol 50 milliGRAM(s) Oral every 6 hours    MEDICATIONS  (PRN):  acetaminophen     Tablet .. 650 milliGRAM(s) Oral every 6 hours PRN Temp greater or equal to 38C (100.4F), Mild Pain (1 - 3)  aluminum hydroxide/magnesium hydroxide/simethicone Suspension 30 milliLiter(s) Oral every 4 hours PRN Dyspepsia  melatonin 3 milliGRAM(s) Oral at bedtime PRN Insomnia  ondansetron Injectable 4 milliGRAM(s) IV Push every 8 hours PRN Nausea and/or Vomiting    PRESENT SYMPTOMS:  Pain: [ ]yes [X ]no  QOL impact -   Location -                    Aggravating factors -  Quality -  Radiation -  Timing-  Severity (0-10 scale):  Minimal acceptable level (0-10 scale):    Dyspnea:                           [X ]Mild [ ]Moderate [ ]Severe  Anxiety:                             [ ]Mild [ ]Moderate [ ]Severe  Fatigue:                             [ X]Mild [ ]Moderate [ ]Severe  Nausea:                             [ ]Mild [ ]Moderate [ ]Severe  Loss of appetite:              [ ]Mild [ ]Moderate [ ]Severe  Constipation:                    [ ]Mild [ ]Moderate [ ]Severe    Other Symptoms:  + dizziness   [X ]All other review of systems negative     Palliative Performance Status Version 2:      40   %    http://npcrc.org/files/news/palliative_performance_scale_ppsv2.pdf    PHYSICAL EXAM:  Vital Signs Last 24 Hrs  T(C): 36.7 (11 Jan 2023 10:41), Max: 36.7 (11 Jan 2023 10:41)  T(F): 98.1 (11 Jan 2023 10:41), Max: 98.1 (11 Jan 2023 10:41)  HR: 88 (11 Jan 2023 10:41) (72 - 91)  BP: 91/51 (11 Jan 2023 10:41) (91/51 - 101/51)  RR: 17 (11 Jan 2023 10:41) (17 - 18)  SpO2: 95% (11 Jan 2023 10:41) (95% - 100%)    GENERAL:  [X ]Alert  [ X]Oriented x  3  [ ]Lethargic  [ ]Cachexia  [ ]Unarousable  [ X]Verbal  [ ]Non-Verbal  Behavioral:   [ ] Anxiety  [ ] Delirium [ ] Agitation [X ] Calm  HEENT:  [ ]Normal   [ X]Dry mouth   [ ]ET Tube/Trach  [ ]Oral lesions  PULMONARY:   [ ]Clear [ X] No Tachypnea  [ ]Audible excessive secretions   [ ]Rhonchi        [ ]Right [ ]Left [ ]Bilateral  [ ]Crackles        [ ]Right [ ]Left [ ]Bilateral  [ ]Wheezing     [ ]Right [ ]Left [ ]Bilateral  [ ]Diminished breath sounds [ ]right [ ]left [ ]bilateral  GASTROINTESTINAL:  [X]Soft  [ ]Distended   [ ]+BS  [ ]Non tender [ ]Tender  [ ]PEG [ ]OGT/ NGT  Last BM:   GENITOURINARY:  MUSCULOSKELETAL:   [ ]Normal   [X ]Weakness  [ ]Bed/Wheelchair bound [ ]Edema  NEUROLOGIC:   [X ]No focal deficits  [ ]Cognitive impairment  [ ]Dysphagia [ ]Dysarthria [ ]Paresis [ ]Other   SKIN:   [X ]Normal    [ ]Rash  [ ]Pressure ulcer(s)       Present on admission [ ]y [ ]n      LABS:  reviewed                        7.9    13.60 )-----------( 116      ( 11 Jan 2023 08:14 )             26.0   01-11    132<L>  |  98  |  43<H>  ----------------------------<  128<H>  4.8   |  21  |  1.8<H>    Ca    8.7      11 Jan 2023 08:14  Mg     2.2     01-11    TPro  6.0  /  Alb  3.2<L>  /  TBili  0.6  /  DBili  x   /  AST  65<H>  /  ALT  32  /  AlkPhos  893<H>  01-11      Urinalysis Basic - ( 10 Dereck 2023 20:14 )    Color: Yellow / Appearance: Clear / SG: >1.050 / pH: x  Gluc: x / Ketone: Negative  / Bili: Negative / Urobili: 3 mg/dL   Blood: x / Protein: 30 mg/dL / Nitrite: Negative   Leuk Esterase: Negative / RBC: 100 /HPF / WBC 4 /HPF   Sq Epi: x / Non Sq Epi: 0 /HPF / Bacteria: Negative      RADIOLOGY & ADDITIONAL STUDIES:  reviewed    < from: Xray Chest 1 View- PORTABLE-Urgent (01.09.23 @ 18:58) >  Impression:    Right suprahilar opacity. Without difference.    Please see concurrent CT scan of the thorax.        < end of copied text >    < from: CT Angio Chest PE Protocol w/ IV Cont (01.09.23 @ 17:16) >    IMPRESSION:    1. Since August 28, 2022, no evidence of pulmonary embolus;   redemonstrated are scattered areas of bronchiolectasis and bronchial wall   thickening with areas of mucous plugging, compatible with small airways   disease.    2. Since December 19, 2022, innumerable bilobar hepatic metastases have   overall increased in size and number.    3. Increased bulky upper abdominal and retroperitoneal adenopathy.    4. New small volume abdominopelvic ascites and new trace bilateral   pleural effusions, right greater than left.    --- End of Report ---    < end of copied text >    < from: 12 Lead ECG (01.09.23 @ 16:56) >    Ventricular Rate 98 BPM    Atrial Rate 98 BPM    P-R Interval 238 ms    QRS Duration 110 ms    Q-T Interval 364 ms    QTC Calculation(Bazett) 464 ms    P Axis 73 degrees    R Axis -58 degrees    T Axis 97 degrees    Diagnosis Line Sinus rhythm [id number] degree A-V block  Left anterior fascicular block  ST & T wave abnormality, consider lateral ischemia  Abnormal ECG    Confirmed by SHEKHAR OROZCO MD ([id number]) on 1/10/2023 6:58:23 AM    < end of copied text >      Goals of Care Document:   - spoke with patient and son  - plan for family meeting between 2 and 3 tomorrow afternoon- will discuss advance directives and hospice as an option

## 2023-01-11 NOTE — PHYSICAL THERAPY INITIAL EVALUATION ADULT - PERTINENT HX OF CURRENT PROBLEM, REHAB EVAL
77 year old patient known to have, NSCLC Ca Stage 3 not on Chemo, HTN, HLD, DM and CKD [id number], presented to ED for abdominal discomfort associated with new abdominal mass Patient reports that he has been having B/L lower abdominal pain, intermittent and sharp associated with abdominal mass periumbilical that started since previous admission. His PCP prescribed him Famotidine, Dexamethasone 1 mg daily and Lactulose with no improvement in his symptoms. No nausea, no vomiting and tolerating PO intake (no change from baseline which is decreased in appetite) - only tolerating eating soups but today he has not ate. He is going to the bathroom He also complaints of dizziness, neck pain and SOB upon exertion. He denies fever, chills, URT or urinary symptoms.

## 2023-01-11 NOTE — PHYSICAL THERAPY INITIAL EVALUATION ADULT - LIVES WITH, PROFILE
Pt lives in [facility] 2 [address], + 2 steps in side, stays on first floor, half bath on first floor, pt does not go to second floor./spouse

## 2023-01-11 NOTE — PROGRESS NOTE ADULT - SUBJECTIVE AND OBJECTIVE BOX
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 This morning he is resting comfortably in bed and reports no issues or overnight events.    OBJECTIVE  PAST MEDICAL & SURGICAL HISTORY  HTN (hypertension)    HLD (hyperlipidemia)    Type 2 diabetes mellitus    NSCLC metastatic to liver    No significant past surgical history      ALLERGIES:  No Known Allergies    MEDICATIONS:  STANDING MEDICATIONS  aspirin enteric coated 81 milliGRAM(s) Oral daily  atorvastatin 40 milliGRAM(s) Oral at bedtime  cefTRIAXone   IVPB 1000 milliGRAM(s) IV Intermittent every 24 hours  cholestyramine Powder (Sugar-Free) 4 Gram(s) Oral three times a day  dexAMETHasone     Tablet 1 milliGRAM(s) Oral daily  dronabinol 2.5 milliGRAM(s) Oral two times a day  famotidine    Tablet 20 milliGRAM(s) Oral two times a day  heparin   Injectable 5000 Unit(s) SubCutaneous every 12 hours  influenza  Vaccine (HIGH DOSE) 0.7 milliLiter(s) IntraMuscular once  lactated ringers. 1000 milliLiter(s) IV Continuous <Continuous>  lisinopril 5 milliGRAM(s) Oral daily  montelukast 10 milliGRAM(s) Oral daily  nicotine -   7 mG/24Hr(s) Patch 1 Patch Transdermal daily  polyethylene glycol 3350 17 Gram(s) Oral two times a day  prochlorperazine   Tablet 10 milliGRAM(s) Oral every 6 hours  senna 2 Tablet(s) Oral at bedtime  traMADol 50 milliGRAM(s) Oral every 6 hours    PRN MEDICATIONS  acetaminophen     Tablet .. 650 milliGRAM(s) Oral every 6 hours PRN  aluminum hydroxide/magnesium hydroxide/simethicone Suspension 30 milliLiter(s) Oral every 4 hours PRN  melatonin 3 milliGRAM(s) Oral at bedtime PRN  ondansetron Injectable 4 milliGRAM(s) IV Push every 8 hours PRN      VITAL SIGNS: Last 24 Hours  T(C): 36.7 (11 Jan 2023 10:41), Max: 36.7 (11 Jan 2023 10:41)  T(F): 98.1 (11 Jan 2023 10:41), Max: 98.1 (11 Jan 2023 10:41)  HR: 88 (11 Jan 2023 10:41) (82 - 91)  BP: 91/51 (11 Jan 2023 10:41) (91/51 - 101/51)  BP(mean): --  RR: 17 (11 Jan 2023 10:41) (17 - 18)  SpO2: 95% (11 Jan 2023 10:41) (95% - 100%)    LABS:                        7.9    13.60 )-----------( 116      ( 11 Jan 2023 08:14 )             26.0     01-11    132<L>  |  98  |  43<H>  ----------------------------<  128<H>  4.8   |  21  |  1.8<H>    Ca    8.7      11 Jan 2023 08:14  Mg     2.2     01-11    TPro  6.0  /  Alb  3.2<L>  /  TBili  0.6  /  DBili  x   /  AST  65<H>  /  ALT  32  /  AlkPhos  893<H>  01-11      Urinalysis Basic - ( 10 Dereck 2023 20:14 )    Color: Yellow / Appearance: Clear / SG: >1.050 / pH: x  Gluc: x / Ketone: Negative  / Bili: Negative / Urobili: 3 mg/dL   Blood: x / Protein: 30 mg/dL / Nitrite: Negative   Leuk Esterase: Negative / RBC: 100 /HPF / WBC 4 /HPF   Sq Epi: x / Non Sq Epi: 0 /HPF / Bacteria: Negative            CARDIAC MARKERS ( 10 Dereck 2023 07:05 )  x     / 0.02 ng/mL / x     / x     / x      CARDIAC MARKERS ( 09 Jan 2023 16:17 )  x     / 0.03 ng/mL / x     / x     / x          RADIOLOGY:      PHYSICAL EXAM:  CONSTITUTIONAL: No acute distress, AAO3  HEAD: Atraumatic, normocephalic  EYES: EOM intact, PERRLA, conjunctiva and sclera clear  ENT: moist mucous membranes  PULMONARY: Clear to auscultation bilaterally  CARDIOVASCULAR: Regular rate and rhythm  GASTROINTESTINAL: Soft, non-tender, non-distended; bowel sounds present  MUSCULOSKELETAL: no edema  NEUROLOGY: non-focal  SKIN: warm and dry

## 2023-01-11 NOTE — PHYSICAL THERAPY INITIAL EVALUATION ADULT - GENERAL OBSERVATIONS, REHAB EVAL
10:20-10:34. Pt encountered semifowler in bed in NAD , +O2 2 lpm via NC, daughter present at b/s. Hold PT 2* to low BP in sitting L UE: 91/51mmHg, HR 87 bpm. SPO2 95%. with c/o lightheadedness and pain in L side of neck. James JACKMAN notified. Pt speaks minimal English and fluent Spanish. Daughter interpreted as needed.

## 2023-01-12 NOTE — CONSULT NOTE ADULT - ATTENDING COMMENTS
77-year-old male with history of lung cancer treated as above recently with metastatic disease who did not tolerate single agent Alimta.  He continues to decline and was seen by palliative care team and was made DNR/DNI and to go home on hospice which is more than appropriate.  Continue with comfort measures

## 2023-01-12 NOTE — PROGRESS NOTE ADULT - SUBJECTIVE AND OBJECTIVE BOX
HPI:    77 year old patient known to have, NSCLC Ca Stage 3 not on Chemo, HTN, HLD, DM and CKD [id number], presented to ED for abdominal discomfort associated with new abdominal mass at periumbilical site, Bl pleural effusions, small volume ascites, anemia. Patient is no longer a candidate for cancer treatment and heme onc did recommend CMO in last note- pending brain MRI. Palliative consulted for GOC.     INTERVAL EVENTS:  - patient complaining of dizziness which was worse in the chair and with transfer, but feels better now that hes in bed. he also complains of dyspnea. denies pain  - family present for GOC meeting. Patient asserts he just wants to go home.     ADVANCE DIRECTIVES:    MOLST  [ ]  Living Will  [ ]   DECISION MAKER(s):  [ ] Health Care Proxy(s)  [X ] Surrogate(s)  [ ] Guardian           Name(s): Phone Number(s): Wife, daughter, and son     BASELINE (I)ADL(s) (prior to admission):  [place]: [ ]Total  [ X] Moderate [ ]Dependent  Palliative Performance Status Version 2:       60  %    [email or web address]    Allergies    No Known Allergies    Intolerances    MEDICATIONS  (STANDING):  aspirin enteric coated 81 milliGRAM(s) Oral daily  atorvastatin 40 milliGRAM(s) Oral at bedtime  cefTRIAXone   IVPB 1000 milliGRAM(s) IV Intermittent every 24 hours  cholestyramine Powder (Sugar-Free) 4 Gram(s) Oral three times a day  dexAMETHasone     Tablet 1 milliGRAM(s) Oral daily  dronabinol 2.5 milliGRAM(s) Oral two times a day  famotidine    Tablet 20 milliGRAM(s) Oral two times a day  heparin   Injectable 5000 Unit(s) SubCutaneous every 12 hours  influenza  Vaccine (HIGH DOSE) 0.7 milliLiter(s) IntraMuscular once  lactated ringers. 1000 milliLiter(s) (100 mL/Hr) IV Continuous <Continuous>  lisinopril 5 milliGRAM(s) Oral daily  montelukast 10 milliGRAM(s) Oral daily  nicotine -   7 mG/24Hr(s) Patch 1 Patch Transdermal daily  polyethylene glycol 3350 17 Gram(s) Oral two times a day  prochlorperazine   Tablet 10 milliGRAM(s) Oral every 6 hours  senna 2 Tablet(s) Oral at bedtime  traMADol 50 milliGRAM(s) Oral every 6 hours    MEDICATIONS  (PRN):  acetaminophen     Tablet .. 650 milliGRAM(s) Oral every 6 hours PRN Temp greater or equal to 38C (100.4F), Mild Pain (1 - 3)  aluminum hydroxide/magnesium hydroxide/simethicone Suspension 30 milliLiter(s) Oral every 4 hours PRN Dyspepsia  melatonin 3 milliGRAM(s) Oral at bedtime PRN Insomnia  ondansetron Injectable 4 milliGRAM(s) IV Push every 8 hours PRN Nausea and/or Vomiting    PRESENT SYMPTOMS:  Pain: [ ]yes [X ]no  QOL impact -   Location -                    Aggravating factors -  Quality -  Radiation -  Timing-  Severity (0-10 scale):  Minimal acceptable level (0-10 scale):    Dyspnea:                           [X ]Mild [ ]Moderate [ ]Severe  Anxiety:                             [ ]Mild [ ]Moderate [ ]Severe  Fatigue:                             [ X]Mild [ ]Moderate [ ]Severe  Nausea:                             [ ]Mild [ ]Moderate [ ]Severe  Loss of appetite:              [ ]Mild [ ]Moderate [ ]Severe  Constipation:                    [ ]Mild [ ]Moderate [ ]Severe    Other Symptoms:  + dizziness   [X ]All other review of systems negative     Palliative Performance Status Version 2:      40   %    [email or web address]    PHYSICAL EXAM:  ICU Vital Signs Last 24 Hrs  T(C): 36.4 (12 Jan 2023 13:10), Max: 37.1 (11 Jan 2023 20:30)  T(F): 97.6 (12 Jan 2023 13:10), Max: 98.8 (11 Jan 2023 20:30)  HR: 100 (12 Jan 2023 13:10) (92 - 100)  BP: 115/57 (12 Jan 2023 13:10) (102/60 - 115/57)    GENERAL:  [X ]Alert  [ X]Oriented x  3  [ ]Lethargic  [ ]Cachexia  [ ]Unarousable  [ X]Verbal  [ ]Non-Verbal  Behavioral:   [ ] Anxiety  [ ] Delirium [ ] Agitation [X ] Calm  HEENT:  [ ]Normal   [ X]Dry mouth   [ ]ET Tube/Trach  [ ]Oral lesions  PULMONARY:   [ ]Clear [ X] No Tachypnea  [ ]Audible excessive secretions   [ ]Rhonchi        [ ]Right [ ]Left [ ]Bilateral  [ ]Crackles        [ ]Right [ ]Left [ ]Bilateral  [ ]Wheezing     [ ]Right [ ]Left [ ]Bilateral  [ ]Diminished breath sounds [ ]right [ ]left [ ]bilateral  GASTROINTESTINAL:  [X]Soft  [ ]Distended   [ ]+BS  [ ]Non tender [ ]Tender  [ ]PEG [ ]OGT/ NGT  Last BM:   GENITOURINARY:  MUSCULOSKELETAL:   [ ]Normal   [X ]Weakness  [ ]Bed/Wheelchair bound [ ]Edema  NEUROLOGIC:   [X ]No focal deficits  [ ]Cognitive impairment  [ ]Dysphagia [ ]Dysarthria [ ]Paresis [ ]Other   SKIN:   [X ]Normal    [ ]Rash  [ ]Pressure ulcer(s)       Present on admission [ ]y [ ]n      LABS:  reviewed                        7.9    13.60 )-----------( 116      ( 11 Jan 2023 08:14 )             26.0   01-11    132<L>  |  98  |  43<H>  ----------------------------<  128<H>  4.8   |  21  |  1.8<H>    Ca    8.7      11 Jan 2023 08:14  Mg     2.2     01-11    TPro  6.0  /  Alb  3.2<L>  /  TBili  0.6  /  DBili  x   /  AST  65<H>  /  ALT  32  /  AlkPhos  893<H>  01-11      Urinalysis Basic - ( 10 Dereck 2023 20:14 )    Color: Yellow / Appearance: Clear / SG: >1.050 / pH: x  Gluc: x / Ketone: Negative  / Bili: Negative / Urobili: 3 mg/dL   Blood: x / Protein: 30 mg/dL / Nitrite: Negative   Leuk Esterase: Negative / RBC: 100 /HPF / WBC 4 /HPF   Sq Epi: x / Non Sq Epi: 0 /HPF / Bacteria: Negative      RADIOLOGY & ADDITIONAL STUDIES:  reviewed    < from: Xray Chest 1 View- PORTABLE-Urgent (01.09.23 @ 18:58) >  Impression:    Right suprahilar opacity. Without difference.    Please see concurrent CT scan of the thorax.        < end of copied text >    < from: CT Angio Chest PE Protocol w/ IV Cont (01.09.23 @ 17:16) >    IMPRESSION:    1. Since August 28, 2022, no evidence of pulmonary embolus;   redemonstrated are scattered areas of bronchiolectasis and bronchial wall   thickening with areas of mucous plugging, compatible with small airways   disease.    2. Since December 19, 2022, innumerable bilobar hepatic metastases have   overall increased in size and number.    3. Increased bulky upper abdominal and retroperitoneal adenopathy.    4. New small volume abdominopelvic ascites and new trace bilateral   pleural effusions, right greater than left.    --- End of Report ---    < end of copied text >    < from: 12 Lead ECG (01.09.23 @ 16:56) >    Ventricular Rate 98 BPM    Atrial Rate 98 BPM    P-R Interval 238 ms    QRS Duration 110 ms    Q-T Interval 364 ms    QTC Calculation(Bazett) 464 ms    P Axis 73 degrees    R Axis -58 degrees    T Axis 97 degrees    Diagnosis Line Sinus rhythm [id number] degree A-V block  Left anterior fascicular block  ST & T wave abnormality, consider lateral ischemia  Abnormal ECG    Confirmed by SHEKHAR OROZCO MD ([id number]) on 1/10/2023 6:58:23 AM    < end of copied text >

## 2023-01-12 NOTE — CONSULT NOTE ADULT - ASSESSMENT
76 yo M w/ PMH of metastatic lung cancer and failure to thrive presented to ED for abdominal pain associated with constipation of 10 days duration. He is currently admitted for pain control. Oncology consulted for management of pt's stage IV lung CA.    # Metastatic lung CA favors adenocarcinoma   - initially diagnosed with stage 3  - placed on chemoRT with carbotaxol followed by durvalmab maintenance   - Survillence PET scan showed extensive recurrence involving abdominopelvic LNs, bone (right iliac wing , T2 , C4 without CT correlate ) , liver lesion measuring 5.8 X 5.2 cm.   Marked decrease in RUL nodule , hilar and paratracheal adenopath  - Placed on Alimita x 1 dose but did not tolerate due to side effect of rash and stomatitis   - seen last on 12/29/22 and recommended comfort measure only    Plan:  - pt has marked decline and failure to thrive.   - pain control of lung CA  - recommends comfort measure only  - goal of care discussion    # Leukocytosis likely reactive from pain as well as from steroid  - continue monitoring   - r/o infection    # Normocytic anemia possibly due to myelophthisis, possible from anemia of chronic inflammation  - iron studies w/ ferritin  - check B12 and folate     Rest of management per primary team. 
77 year old patient known to have, NSCLC Ca Stage 3 not on Chemo, HTN, HLD, DM and CKD [id number], presented to ED for abdominal discomfort associated with new abdominal mass at periumbilical site, Bl pleural effusions, small volume ascites, anemia. Patient is no longer a candidate for cancer treatment and heme onc did recommend CMO in last note- pending brain MRI. Palliative consulted for GOC.     Pain was controlled on exam and patient reports having BM yesterday.   Left message for son to call back to set up family meeting for GOC discussion.     MEDD (morphine equivalent daily dose):      See Recs below.    Please call [phone] with questions or concerns 24/7.   We will continue to follow.     Discussed with primary MD.

## 2023-01-12 NOTE — PROGRESS NOTE ADULT - SUBJECTIVE AND OBJECTIVE BOX
NAHED CASTANEDA 77y Male  MRN#: [id number]   Hospital Day: 3d    SUBJECTIVE  Patient is a 77y old Male who presents with a chief complaint of Metastatic Lung Cancer (11 Jan 2023 13:37)  Currently admitted to medicine with the primary diagnosis of NSCLC metastatic to bone      INTERVAL HPI AND OVERNIGHT EVENTS:  Patient was examined and seen at bedside. This morning he is resting comfortably in bed and reports no issues or overnight events.    OBJECTIVE  PAST MEDICAL & SURGICAL HISTORY  HTN (hypertension)    HLD (hyperlipidemia)    Type 2 diabetes mellitus    NSCLC metastatic to liver    No significant past surgical history      ALLERGIES:  No Known Allergies    MEDICATIONS:  STANDING MEDICATIONS  aspirin enteric coated 81 milliGRAM(s) Oral daily  atorvastatin 40 milliGRAM(s) Oral at bedtime  cefTRIAXone   IVPB 1000 milliGRAM(s) IV Intermittent every 24 hours  cholestyramine Powder (Sugar-Free) 4 Gram(s) Oral three times a day  dexAMETHasone     Tablet 1 milliGRAM(s) Oral daily  dextrose 50% Injectable 50 milliLiter(s) IV Push once  dronabinol 2.5 milliGRAM(s) Oral two times a day  famotidine    Tablet 20 milliGRAM(s) Oral two times a day  heparin   Injectable 5000 Unit(s) SubCutaneous every 12 hours  influenza  Vaccine (HIGH DOSE) 0.7 milliLiter(s) IntraMuscular once  insulin regular  human recombinant 6 Unit(s) IV Push once  lactated ringers. 1000 milliLiter(s) IV Continuous <Continuous>  lactated ringers. 1000 milliLiter(s) IV Continuous <Continuous>  lisinopril 5 milliGRAM(s) Oral daily  montelukast 10 milliGRAM(s) Oral daily  nicotine -   7 mG/24Hr(s) Patch 1 Patch Transdermal daily  polyethylene glycol 3350 17 Gram(s) Oral two times a day  prochlorperazine   Tablet 10 milliGRAM(s) Oral every 6 hours  senna 2 Tablet(s) Oral at bedtime  traMADol 50 milliGRAM(s) Oral every 6 hours    PRN MEDICATIONS  acetaminophen     Tablet .. 650 milliGRAM(s) Oral every 6 hours PRN  aluminum hydroxide/magnesium hydroxide/simethicone Suspension 30 milliLiter(s) Oral every 4 hours PRN  melatonin 3 milliGRAM(s) Oral at bedtime PRN  ondansetron Injectable 4 milliGRAM(s) IV Push every 8 hours PRN      VITAL SIGNS: Last 24 Hours  T(C): 37.1 (11 Jan 2023 20:30), Max: 37.1 (11 Jan 2023 20:30)  T(F): 98.8 (11 Jan 2023 20:30), Max: 98.8 (11 Jan 2023 20:30)  HR: 92 (11 Jan 2023 20:30) (88 - 93)  BP: 102/60 (11 Jan 2023 20:30) (91/51 - 102/60)  BP(mean): --  RR: 19 (11 Jan 2023 20:30) (17 - 19)  SpO2: 95% (11 Jan 2023 10:41) (95% - 95%)    LABS:                        7.9    13.52 )-----------( 113      ( 12 Jan 2023 05:10 )             25.8     01-12    135  |  101  |  51<H>  ----------------------------<  114<H>  6.1<HH>   |  17  |  2.4<H>    Ca    8.6      12 Jan 2023 05:10  Mg     2.2     01-12    TPro  6.0  /  Alb  3.2<L>  /  TBili  0.6  /  DBili  x   /  AST  65<H>  /  ALT  32  /  AlkPhos  893<H>  01-11      Urinalysis Basic - ( 10 Dereck 2023 20:14 )    Color: Yellow / Appearance: Clear / SG: >1.050 / pH: x  Gluc: x / Ketone: Negative  / Bili: Negative / Urobili: 3 mg/dL   Blood: x / Protein: 30 mg/dL / Nitrite: Negative   Leuk Esterase: Negative / RBC: 100 /HPF / WBC 4 /HPF   Sq Epi: x / Non Sq Epi: 0 /HPF / Bacteria: Negative                RADIOLOGY:      PHYSICAL EXAM:  CONSTITUTIONAL: No acute distress, AAOx3  HEAD: Atraumatic, normocephalic  EYES: EOM intact, PERRLA, conjunctiva and sclera clear  ENT: moist mucous membranes  PULMONARY: Clear to auscultation bilaterally  CARDIOVASCULAR: Regular rate and rhythm  GASTROINTESTINAL: Soft, non-tender, non-distended; bowel sounds present  MUSCULOSKELETAL: no edema  NEUROLOGY: non-focal  SKIN: warm and dry

## 2023-01-12 NOTE — CONSULT NOTE ADULT - SUBJECTIVE AND OBJECTIVE BOX
Patient is a 77y old  Male who presents with a chief complaint of Metastatic Lung Cancer (11 Jan 2023 13:37)      HPI:  77 year old patient known to have, NSCLC Ca Stage 3 not on Chemo, HTN, HLD, DM and CKD [id number], presented to ED for abdominal discomfort associated with new abdominal mass  Patient reports that he has been having B/L lower abdominal pain, intermittent and sharp associated with abdominal mass periumbilical that started since previous admission.   His PCP prescribed him Famotidine, Dexamethasone 1 mg daily and Lactulose with no improvement in his symptoms.   No nausea, no vomiting and tolerating PO intake (no change from baseline which is decreased in appetite) - only tolerating eating soups but today he has not ate. He is going to the bathroom  He also complaints of dizziness, neck pain and SOB upon exertion.    He denies fever, chills, URT or urinary symptoms.     ROS   Positive abdominal swelling and mass, poor oral intake   Negative fever, chills, nausea, vomiting, chest pain, abdominal pain, sob, cough, constipation, diarrhea, incontinence    ED Vital Signs Last 24 Hrs  T(C): 36.3 (09 Jan 2023 16:17), Max: 36.3 (09 Jan 2023 16:17)  T(F): 97.4 (09 Jan 2023 16:17), Max: 97.4 (09 Jan 2023 16:17)  HR: 98 (09 Jan 2023 16:17) (98 - 98)  BP: 108/52 (09 Jan 2023 16:17) (108/52 - 108/52)  RR: 18 (09 Jan 2023 16:17) (18 - 18)  SpO2: 100% (09 Jan 2023 16:17) (100% - 100%)    Labs significant for WBC 14.3 - Hb 9.1 - MCV 92 -  - AG 15 - crea 1.3 (b/l 1.4) - ALP 1382 - trop 0.03 x1   UA neg - RVP neg   EKG NSR - LAFB   CXR unofficial - diffuse interstitial markings - R lung opacities   CT agnio no PE - small airway disease - bilobar hepatic mets with increasing size - small volume ascites - bilat trace pleural effusion - abdo/retrioperitoneal LN   In the ED given 1L LR  (09 Jan 2023 21:01)       ROS:  Negative except for: Negative     PAST MEDICAL & SURGICAL HISTORY:  HTN (hypertension)  HLD (hyperlipidemia)  Type 2 diabetes mellitus  NSCLC metastatic to liver  No significant past surgical history    SOCIAL HISTORY: Ex-smoker. No alcohol use.    FAMILY HISTORY: No pertinent family hx.    MEDICATIONS  (STANDING):  aspirin enteric coated 81 milliGRAM(s) Oral daily  atorvastatin 40 milliGRAM(s) Oral at bedtime  cefTRIAXone   IVPB 1000 milliGRAM(s) IV Intermittent every 24 hours  cholestyramine Powder (Sugar-Free) 4 Gram(s) Oral three times a day  dexAMETHasone     Tablet 1 milliGRAM(s) Oral daily  dronabinol 2.5 milliGRAM(s) Oral two times a day  famotidine    Tablet 20 milliGRAM(s) Oral two times a day  heparin   Injectable 5000 Unit(s) SubCutaneous every 12 hours  influenza  Vaccine (HIGH DOSE) 0.7 milliLiter(s) IntraMuscular once  lactated ringers. 1000 milliLiter(s) (75 mL/Hr) IV Continuous <Continuous>  lactated ringers. 1000 milliLiter(s) (100 mL/Hr) IV Continuous <Continuous>  lisinopril 5 milliGRAM(s) Oral daily  montelukast 10 milliGRAM(s) Oral daily  nicotine -   7 mG/24Hr(s) Patch 1 Patch Transdermal daily  polyethylene glycol 3350 17 Gram(s) Oral two times a day  prochlorperazine   Tablet 10 milliGRAM(s) Oral every 6 hours  senna 2 Tablet(s) Oral at bedtime  traMADol 50 milliGRAM(s) Oral every 6 hours    MEDICATIONS  (PRN):  acetaminophen     Tablet .. 650 milliGRAM(s) Oral every 6 hours PRN Temp greater or equal to 38C (100.4F), Mild Pain (1 - 3)  aluminum hydroxide/magnesium hydroxide/simethicone Suspension 30 milliLiter(s) Oral every 4 hours PRN Dyspepsia  melatonin 3 milliGRAM(s) Oral at bedtime PRN Insomnia  ondansetron Injectable 4 milliGRAM(s) IV Push every 8 hours PRN Nausea and/or Vomiting      Allergies    No Known Allergies    Intolerances        Vital Signs Last 24 Hrs  T(C): 37.1 (11 Jan 2023 20:30), Max: 37.1 (11 Jan 2023 20:30)  T(F): 98.8 (11 Jan 2023 20:30), Max: 98.8 (11 Jan 2023 20:30)  HR: 92 (11 Jan 2023 20:30) (88 - 93)  BP: 102/60 (11 Jan 2023 20:30) (91/51 - 102/60)  BP(mean): --  RR: 19 (11 Jan 2023 20:30) (17 - 19)  SpO2: 95% (11 Jan 2023 10:41) (95% - 95%)    Parameters below as of 11 Jan 2023 10:41  Patient On (Oxygen Delivery Method): nasal cannula  O2 Flow (L/min): 2      PHYSICAL EXAM  General: adult in NAD  HEENT: clear oropharynx, anicteric sclera, pink conjunctiva  Neck: supple  CV: normal S1/S2 with no murmur rubs or gallops  Lungs: positive air movement b/l ant lungs,clear to auscultation, no wheezes, no rales  Abdomen: soft non-tender non-distended, no hepatosplenomegaly  Ext: no clubbing cyanosis or edema  Skin: no rashes and no petechiae  Neuro: alert and oriented X 4, no focal deficits      LABS:                          7.9    13.52 )-----------( 113      ( 12 Jan 2023 05:10 )             25.8         Mean Cell Volume : 94.9 fL  Mean Cell Hemoglobin : 29.0 pg  Mean Cell Hemoglobin Concentration : 30.6 g/dL  Auto Neutrophil # : x  Auto Lymphocyte # : x  Auto Monocyte # : x  Auto Eosinophil # : x  Auto Basophil # : x  Auto Neutrophil % : x  Auto Lymphocyte % : x  Auto Monocyte % : x  Auto Eosinophil % : x  Auto Basophil % : x      Serial CBC's  01-12 @ 05:10  Hct-25.8 / Hgb-7.9 / Plat-113 / RBC-2.72 / WBC-13.52  Serial CBC's  01-11 @ 08:14  Hct-26.0 / Hgb-7.9 / Plat-116 / RBC-2.73 / WBC-13.60  Serial CBC's  01-10 @ 07:05  Hct-25.0 / Hgb-7.9 / Plat-119 / RBC-2.72 / WBC-13.98  Serial CBC's  01-09 @ 16:17  Hct-28.7 / Hgb-9.1 / Plat-118 / RBC-3.12 / WBC-14.30      01-12    135  |  101  |  51<H>  ----------------------------<  114<H>  6.1<HH>   |  17  |  2.4<H>    Ca    8.6      12 Jan 2023 05:10  Mg     2.2     01-12    TPro  6.0  /  Alb  3.2<L>  /  TBili  0.6  /  DBili  x   /  AST  65<H>  /  ALT  32  /  AlkPhos  893<H>  01-11                      BLOOD SMEAR INTERPRETATION:       RADIOLOGY & ADDITIONAL STUDIES:    < from: Xray Chest 1 View- PORTABLE-Urgent (01.09.23 @ 18:58) >  Impression:  Right suprahilar opacity. Without difference.  Please see concurrent CT scan of the thorax.  < end of copied text >    < from: CT Angio Chest PE Protocol w/ IV Cont (01.09.23 @ 17:16) >  IMPRESSION:  1. Since August 28, 2022, no evidence of pulmonary embolus;   redemonstrated are scattered areas of bronchiolectasis and bronchial wall   thickening with areas of mucous plugging, compatible with small airways   disease.  2. Since December 19, 2022, innumerable bilobar hepatic metastases have   overall increased in size and number.  3. Increased bulky upper abdominal and retroperitoneal adenopathy.  4. New small volume abdominopelvic ascites and new trace bilateral   pleural effusions, right greater than left.  --- End of Report ---  < end of copied text >    < from: CT Abdomen and Pelvis w/ IV Cont (01.09.23 @ 17:13) >  IMPRESSION:  1. Since August 28, 2022, no evidence of pulmonary embolus;   redemonstrated are scattered areas of bronchiolectasis and bronchial wall   thickening with areas of mucous plugging, compatible with small airways   disease.  2. Since December 19, 2022, innumerable bilobar hepatic metastases have   overall increased in size and number.  3. Increased bulky upper abdominal and retroperitoneal adenopathy.  4. New small volume abdominopelvic ascites and new trace bilateral   pleural effusions, right greater than left.  --- End of Report ---  < end of copied text >       Patient is a 77y old  Male who presents with a chief complaint of Metastatic Lung Cancer (11 Jan 2023 13:37)      HPI:  77 year old patient known to have, NSCLC Ca Stage 3 not on Chemo, HTN, HLD, DM and CKD [id number], presented to ED for abdominal discomfort associated with new abdominal mass  Patient reports that he has been having B/L lower abdominal pain, intermittent and sharp associated with abdominal mass periumbilical that started since previous admission.   His PCP prescribed him Famotidine, Dexamethasone 1 mg daily and Lactulose with no improvement in his symptoms.   No nausea, no vomiting and tolerating PO intake (no change from baseline which is decreased in appetite) - only tolerating eating soups but today he has not ate. He is going to the bathroom  He also complaints of dizziness, neck pain and SOB upon exertion.    He denies fever, chills, URT or urinary symptoms.     ROS   Positive abdominal swelling and mass, poor oral intake   Negative fever, chills, nausea, vomiting, chest pain, abdominal pain, sob, cough, constipation, diarrhea, incontinence    ED Vital Signs Last 24 Hrs  T(C): 36.3 (09 Jan 2023 16:17), Max: 36.3 (09 Jan 2023 16:17)  T(F): 97.4 (09 Jan 2023 16:17), Max: 97.4 (09 Jan 2023 16:17)  HR: 98 (09 Jan 2023 16:17) (98 - 98)  BP: 108/52 (09 Jan 2023 16:17) (108/52 - 108/52)  RR: 18 (09 Jan 2023 16:17) (18 - 18)  SpO2: 100% (09 Jan 2023 16:17) (100% - 100%)    Labs significant for WBC 14.3 - Hb 9.1 - MCV 92 -  - AG 15 - crea 1.3 (b/l 1.4) - ALP 1382 - trop 0.03 x1   UA neg - RVP neg   EKG NSR - LAFB   CXR unofficial - diffuse interstitial markings - R lung opacities   CT agnio no PE - small airway disease - bilobar hepatic mets with increasing size - small volume ascites - bilat trace pleural effusion - abdo/retrioperitoneal LN   In the ED given 1L LR  (09 Jan 2023 21:01)       ROS:  Negative except for: Negative     PAST MEDICAL & SURGICAL HISTORY:  HTN (hypertension)  HLD (hyperlipidemia)  Type 2 diabetes mellitus  NSCLC metastatic to liver  No significant past surgical history    SOCIAL HISTORY: Ex-smoker. No alcohol use.    FAMILY HISTORY: No pertinent family hx.    MEDICATIONS  (STANDING):  aspirin enteric coated 81 milliGRAM(s) Oral daily  atorvastatin 40 milliGRAM(s) Oral at bedtime  cefTRIAXone   IVPB 1000 milliGRAM(s) IV Intermittent every 24 hours  cholestyramine Powder (Sugar-Free) 4 Gram(s) Oral three times a day  dexAMETHasone     Tablet 1 milliGRAM(s) Oral daily  dronabinol 2.5 milliGRAM(s) Oral two times a day  famotidine    Tablet 20 milliGRAM(s) Oral two times a day  heparin   Injectable 5000 Unit(s) SubCutaneous every 12 hours  influenza  Vaccine (HIGH DOSE) 0.7 milliLiter(s) IntraMuscular once  lactated ringers. 1000 milliLiter(s) (75 mL/Hr) IV Continuous <Continuous>  lactated ringers. 1000 milliLiter(s) (100 mL/Hr) IV Continuous <Continuous>  lisinopril 5 milliGRAM(s) Oral daily  montelukast 10 milliGRAM(s) Oral daily  nicotine -   7 mG/24Hr(s) Patch 1 Patch Transdermal daily  polyethylene glycol 3350 17 Gram(s) Oral two times a day  prochlorperazine   Tablet 10 milliGRAM(s) Oral every 6 hours  senna 2 Tablet(s) Oral at bedtime  traMADol 50 milliGRAM(s) Oral every 6 hours    MEDICATIONS  (PRN):  acetaminophen     Tablet .. 650 milliGRAM(s) Oral every 6 hours PRN Temp greater or equal to 38C (100.4F), Mild Pain (1 - 3)  aluminum hydroxide/magnesium hydroxide/simethicone Suspension 30 milliLiter(s) Oral every 4 hours PRN Dyspepsia  melatonin 3 milliGRAM(s) Oral at bedtime PRN Insomnia  ondansetron Injectable 4 milliGRAM(s) IV Push every 8 hours PRN Nausea and/or Vomiting      Allergies    No Known Allergies    Intolerances        Vital Signs Last 24 Hrs  T(C): 37.1 (11 Jan 2023 20:30), Max: 37.1 (11 Jan 2023 20:30)  T(F): 98.8 (11 Jan 2023 20:30), Max: 98.8 (11 Jan 2023 20:30)  HR: 92 (11 Jan 2023 20:30) (88 - 93)  BP: 102/60 (11 Jan 2023 20:30) (91/51 - 102/60)  BP(mean): --  RR: 19 (11 Jan 2023 20:30) (17 - 19)  SpO2: 95% (11 Jan 2023 10:41) (95% - 95%)    Parameters below as of 11 Jan 2023 10:41  Patient On (Oxygen Delivery Method): nasal cannula  O2 Flow (L/min): 2      PHYSICAL EXAM  General: adult in NAD on O2, ill-appearing  HEENT: clear oropharynx, anicteric sclera, pink conjunctiva  Neck: supple  CV: normal S1/S2 with no murmur rubs or gallops  Lungs: positive air movement b/l ant lungs,clear to auscultation, no wheezes, no rales  Abdomen: soft non-tender non-distended, no hepatosplenomegaly  Ext: no clubbing cyanosis or edema  Skin: no rashes and no petechiae  Neuro: no focal deficits      LABS:                          7.9    13.52 )-----------( 113      ( 12 Jan 2023 05:10 )             25.8         Mean Cell Volume : 94.9 fL  Mean Cell Hemoglobin : 29.0 pg  Mean Cell Hemoglobin Concentration : 30.6 g/dL  Auto Neutrophil # : x  Auto Lymphocyte # : x  Auto Monocyte # : x  Auto Eosinophil # : x  Auto Basophil # : x  Auto Neutrophil % : x  Auto Lymphocyte % : x  Auto Monocyte % : x  Auto Eosinophil % : x  Auto Basophil % : x      Serial CBC's  01-12 @ 05:10  Hct-25.8 / Hgb-7.9 / Plat-113 / RBC-2.72 / WBC-13.52  Serial CBC's  01-11 @ 08:14  Hct-26.0 / Hgb-7.9 / Plat-116 / RBC-2.73 / WBC-13.60  Serial CBC's  01-10 @ 07:05  Hct-25.0 / Hgb-7.9 / Plat-119 / RBC-2.72 / WBC-13.98  Serial CBC's  01-09 @ 16:17  Hct-28.7 / Hgb-9.1 / Plat-118 / RBC-3.12 / WBC-14.30      01-12    135  |  101  |  51<H>  ----------------------------<  114<H>  6.1<HH>   |  17  |  2.4<H>    Ca    8.6      12 Jan 2023 05:10  Mg     2.2     01-12    TPro  6.0  /  Alb  3.2<L>  /  TBili  0.6  /  DBili  x   /  AST  65<H>  /  ALT  32  /  AlkPhos  893<H>  01-11                      BLOOD SMEAR INTERPRETATION:       RADIOLOGY & ADDITIONAL STUDIES:    < from: Xray Chest 1 View- PORTABLE-Urgent (01.09.23 @ 18:58) >  Impression:  Right suprahilar opacity. Without difference.  Please see concurrent CT scan of the thorax.  < end of copied text >    < from: CT Angio Chest PE Protocol w/ IV Cont (01.09.23 @ 17:16) >  IMPRESSION:  1. Since August 28, 2022, no evidence of pulmonary embolus;   redemonstrated are scattered areas of bronchiolectasis and bronchial wall   thickening with areas of mucous plugging, compatible with small airways   disease.  2. Since December 19, 2022, innumerable bilobar hepatic metastases have   overall increased in size and number.  3. Increased bulky upper abdominal and retroperitoneal adenopathy.  4. New small volume abdominopelvic ascites and new trace bilateral   pleural effusions, right greater than left.  --- End of Report ---  < end of copied text >    < from: CT Abdomen and Pelvis w/ IV Cont (01.09.23 @ 17:13) >  IMPRESSION:  1. Since August 28, 2022, no evidence of pulmonary embolus;   redemonstrated are scattered areas of bronchiolectasis and bronchial wall   thickening with areas of mucous plugging, compatible with small airways   disease.  2. Since December 19, 2022, innumerable bilobar hepatic metastases have   overall increased in size and number.  3. Increased bulky upper abdominal and retroperitoneal adenopathy.  4. New small volume abdominopelvic ascites and new trace bilateral   pleural effusions, right greater than left.  --- End of Report ---  < end of copied text >

## 2023-01-12 NOTE — PROGRESS NOTE ADULT - CONVERSATION DETAILS
Spoke with family at bedside. Patient is aware he does not have chemotherapy or cancer tx options. He just wants to go home. He defers much of his decision making to his children and wife. Discussed option for comfort care and transition with home hospice. Explained CMO includes stopping labs, vitals, FS, IVF, escalation of care, and adding medications for pain, dyspnea etc. Discussed home hospice and what that would look like for the patient. All family and patient are agreeable to this plan of care. Family would like to place DNR/DNI order as well.

## 2023-01-13 NOTE — DIETITIAN INITIAL EVALUATION ADULT - PERTINENT MEDS FT
MEDICATIONS  (STANDING):  cholestyramine Powder (Sugar-Free) 4 Gram(s) Oral three times a day  dexAMETHasone     Tablet 1 milliGRAM(s) Oral daily  dronabinol 2.5 milliGRAM(s) Oral two times a day  famotidine    Tablet 20 milliGRAM(s) Oral two times a day  lisinopril 5 milliGRAM(s) Oral daily  montelukast 10 milliGRAM(s) Oral daily  polyethylene glycol 3350 17 Gram(s) Oral two times a day  prochlorperazine   Tablet 10 milliGRAM(s) Oral every 6 hours  senna 2 Tablet(s) Oral at bedtime  traMADol 50 milliGRAM(s) Oral every 6 hours    MEDICATIONS  (PRN):  acetaminophen     Tablet .. 650 milliGRAM(s) Oral every 6 hours PRN Temp greater or equal to 38C (100.4F), Mild Pain (1 - 3)  aluminum hydroxide/magnesium hydroxide/simethicone Suspension 30 milliLiter(s) Oral every 4 hours PRN Dyspepsia  HYDROmorphone  Injectable 0.25 milliGRAM(s) IV Push every 1 hour PRN Pain or dysnpea  LORazepam     Tablet 0.5 milliGRAM(s) Oral every 6 hours PRN Anxiety  melatonin 3 milliGRAM(s) Oral at bedtime PRN Insomnia  ondansetron Injectable 4 milliGRAM(s) IV Push every 8 hours PRN Nausea and/or Vomiting

## 2023-01-13 NOTE — PROGRESS NOTE ADULT - PROBLEM SELECTOR PLAN 1
with mets, new abdominal pain  pending MRI brain  not candidate for further treatment   hospice appropriate if within GOC- will follow up for GOC
intermittent  none at rest  start Dilaudid 0.25 mg IVP Q 1 H PRN  NC as needed
intermittent  none at rest  Dilaudid 0.25 mg IVP Q 1 H PRN  NC as needed

## 2023-01-13 NOTE — DIETITIAN INITIAL EVALUATION ADULT - PERTINENT LABORATORY DATA
01-12    134<L>  |  101  |  54<H>  ----------------------------<  213<H>  5.4<H>   |  19  |  2.8<H>    Ca    8.5      12 Jan 2023 11:30  Mg     2.2     01-12    POCT Blood Glucose.: 160 mg/dL (01-12-23 @ 16:38)  A1C with Estimated Average Glucose Result: 6.1 % (12-20-22 @ 06:38)  A1C with Estimated Average Glucose Result: 7.0 % (08-29-22 @ 07:30)  A1C with Estimated Average Glucose Result: 8.5 % (06-09-22 @ 07:30)

## 2023-01-13 NOTE — DIETITIAN INITIAL EVALUATION ADULT - NS FNS REASON FOR WEIGHT CHANG
Per EMR:  .8 lbs  8/28/22 187.2 lbs  6/6/22 201.1 lbs    Weight loss of 8.9% x 4.5 months, 15% x 7.5 months.      lbs, 75.5 kg/decreased po intake

## 2023-01-13 NOTE — PROGRESS NOTE ADULT - NS ATTEND AMEND GEN_ALL_CORE FT
Plan for family meeting from 2-3 pm to discuss GOC  Will follow
HIgh risk patient requiring IV pina meds

## 2023-01-13 NOTE — PROGRESS NOTE ADULT - PROBLEM SELECTOR PLAN 5
BM Tuesday per patient  continue senna 2 tabs QHS  continue Miralax 17 g QQ  PRn nausea meds for nausea
BM Tuesday per patient  continue senna 2 tabs QHS  continue Miralax 17 g QQ  PRn nausea meds for nausea

## 2023-01-13 NOTE — PROGRESS NOTE ADULT - PROBLEM SELECTOR PLAN 3
with mets, new abdominal mass  not candidate for further treatment =- see recs per heme onc  plan for home hospice- notified case mgmt
BM yesterday per patient  continue senna 2 tabs QHS  continue Miralax 17 g QQ  PRn nausea meds for nausea
with mets, new abdominal mass  not candidate for further treatment - see recs per heme onc  plan for home hospice- notified case mgmt

## 2023-01-13 NOTE — PROGRESS NOTE ADULT - SUBJECTIVE AND OBJECTIVE BOX
NAHED CASTANEDA 77y Male  MRN#: [id number]   Hospital Day: 4d    SUBJECTIVE  Patient is a 77y old Male who presents with a chief complaint of NSCLC METASTATIC TO BONE; ABDOMINAL PAIN     (13 Jan 2023 12:28)  Currently admitted to medicine with the primary diagnosis of NSCLC metastatic to bone      INTERVAL HPI AND OVERNIGHT EVENTS:  Patient was examined and seen at bedside. This morning he is resting comfortably in bed and reports no issues or overnight events.    OBJECTIVE  PAST MEDICAL & SURGICAL HISTORY  HTN (hypertension)    HLD (hyperlipidemia)    Type 2 diabetes mellitus    NSCLC metastatic to liver    No significant past surgical history      ALLERGIES:  No Known Allergies    MEDICATIONS:  STANDING MEDICATIONS  aspirin enteric coated 81 milliGRAM(s) Oral daily  cholestyramine Powder (Sugar-Free) 4 Gram(s) Oral three times a day  dexAMETHasone     Tablet 1 milliGRAM(s) Oral daily  dronabinol 2.5 milliGRAM(s) Oral two times a day  famotidine    Tablet 20 milliGRAM(s) Oral two times a day  lisinopril 5 milliGRAM(s) Oral daily  montelukast 10 milliGRAM(s) Oral daily  nicotine -   7 mG/24Hr(s) Patch 1 Patch Transdermal daily  polyethylene glycol 3350 17 Gram(s) Oral two times a day  prochlorperazine   Tablet 10 milliGRAM(s) Oral every 6 hours  senna 2 Tablet(s) Oral at bedtime  traMADol 50 milliGRAM(s) Oral every 6 hours    PRN MEDICATIONS  acetaminophen     Tablet .. 650 milliGRAM(s) Oral every 6 hours PRN  aluminum hydroxide/magnesium hydroxide/simethicone Suspension 30 milliLiter(s) Oral every 4 hours PRN  HYDROmorphone  Injectable 0.25 milliGRAM(s) IV Push every 1 hour PRN  LORazepam     Tablet 0.5 milliGRAM(s) Oral every 6 hours PRN  melatonin 3 milliGRAM(s) Oral at bedtime PRN  ondansetron Injectable 4 milliGRAM(s) IV Push every 8 hours PRN      VITAL SIGNS: Last 24 Hours  T(C): --  T(F): --  HR: --  BP: --  BP(mean): --  RR: --  SpO2: --    LABS:                        7.9    13.52 )-----------( 113      ( 12 Jan 2023 05:10 )             25.8     01-12    134<L>  |  101  |  54<H>  ----------------------------<  213<H>  5.4<H>   |  19  |  2.8<H>    Ca    8.5      12 Jan 2023 11:30  Mg     2.2     01-12                Culture - Blood (collected 11 Jan 2023 08:14)  Source: .Blood None  Preliminary Report (12 Jan 2023 18:01):    No growth to date.          RADIOLOGY:      PHYSICAL EXAM:  CONSTITUTIONAL: No acute distress, AAOx3  HEAD: Atraumatic, normocephalic  EYES: EOM intact, PERRLA, conjunctiva and sclera clear  ENT: moist mucous membranes  PULMONARY: Clear to auscultation bilaterally  CARDIOVASCULAR: Regular rate and rhythm  GASTROINTESTINAL: Soft, non-tender, non-distended; bowel sounds present  MUSCULOSKELETAL: no edema  NEUROLOGY: non-focal  SKIN: warm and dry

## 2023-01-13 NOTE — PROGRESS NOTE ADULT - SUBJECTIVE AND OBJECTIVE BOX
HPI:    77 year old patient known to have, NSCLC Ca Stage 3 not on Chemo, HTN, HLD, DM and CKD [id number], presented to ED for abdominal discomfort associated with new abdominal mass at periumbilical site, Bl pleural effusions, small volume ascites, anemia. Patient is no longer a candidate for cancer treatment and heme onc did recommend CMO in last note- pending brain MRI. Palliative consulted for GOC.     INTERVAL EVENTS:  - patient complaining of dizziness which was worse in the chair and with transfer, but feels better now that hes in bed. he denies dyspnea. denies pain  - patient was made CMo yesterday, pending d/c home with hospice monday     ADVANCE DIRECTIVES:    MOLST  [ X ]  Living Will  [ ]   DECISION MAKER(s):  [ ] Health Care Proxy(s)  [X ] Surrogate(s)  [ ] Guardian           Name(s): Phone Number(s): Wife, daughter, and son     BASELINE (I)ADL(s) (prior to admission):  [place]: [ ]Total  [ X] Moderate [ ]Dependent  Palliative Performance Status Version 2:       60  %    http://npcrc.org/files/news/palliative_performance_scale_ppsv2.pdf    Allergies    No Known Allergies    Intolerances    MEDICATIONS  (STANDING):  aspirin enteric coated 81 milliGRAM(s) Oral daily  atorvastatin 40 milliGRAM(s) Oral at bedtime  cefTRIAXone   IVPB 1000 milliGRAM(s) IV Intermittent every 24 hours  cholestyramine Powder (Sugar-Free) 4 Gram(s) Oral three times a day  dexAMETHasone     Tablet 1 milliGRAM(s) Oral daily  dronabinol 2.5 milliGRAM(s) Oral two times a day  famotidine    Tablet 20 milliGRAM(s) Oral two times a day  heparin   Injectable 5000 Unit(s) SubCutaneous every 12 hours  influenza  Vaccine (HIGH DOSE) 0.7 milliLiter(s) IntraMuscular once  lactated ringers. 1000 milliLiter(s) (100 mL/Hr) IV Continuous <Continuous>  lisinopril 5 milliGRAM(s) Oral daily  montelukast 10 milliGRAM(s) Oral daily  nicotine -   7 mG/24Hr(s) Patch 1 Patch Transdermal daily  polyethylene glycol 3350 17 Gram(s) Oral two times a day  prochlorperazine   Tablet 10 milliGRAM(s) Oral every 6 hours  senna 2 Tablet(s) Oral at bedtime  traMADol 50 milliGRAM(s) Oral every 6 hours    MEDICATIONS  (PRN):  acetaminophen     Tablet .. 650 milliGRAM(s) Oral every 6 hours PRN Temp greater or equal to 38C (100.4F), Mild Pain (1 - 3)  aluminum hydroxide/magnesium hydroxide/simethicone Suspension 30 milliLiter(s) Oral every 4 hours PRN Dyspepsia  melatonin 3 milliGRAM(s) Oral at bedtime PRN Insomnia  ondansetron Injectable 4 milliGRAM(s) IV Push every 8 hours PRN Nausea and/or Vomiting    PRESENT SYMPTOMS:  Pain: [ ]yes [X ]no  QOL impact -   Location -                    Aggravating factors -  Quality -  Radiation -  Timing-  Severity (0-10 scale):  Minimal acceptable level (0-10 scale):    Dyspnea:                           [X ]Mild [ ]Moderate [ ]Severe  Anxiety:                             [ ]Mild [ ]Moderate [ ]Severe  Fatigue:                             [ X]Mild [ ]Moderate [ ]Severe  Nausea:                             [ ]Mild [ ]Moderate [ ]Severe  Loss of appetite:              [ ]Mild [ ]Moderate [ ]Severe  Constipation:                    [ ]Mild [ ]Moderate [ ]Severe    Other Symptoms:  + dizziness, is not hungry   [X ]All other review of systems negative     Palliative Performance Status Version 2:      40   %    http://npcrc.org/files/news/palliative_performance_scale_ppsv2.pdf    PHYSICAL EXAM:  GENERAL:  [X ]Alert  [ X]Oriented x  3  [ ]Lethargic  [ ]Cachexia  [ ]Unarousable  [ X]Verbal  [ ]Non-Verbal  Behavioral:   [ ] Anxiety  [ ] Delirium [ ] Agitation [X ] Calm  HEENT:  [ ]Normal   [ X]Dry mouth   [ ]ET Tube/Trach  [ ]Oral lesions  PULMONARY:   [ ]Clear [ X] No Tachypnea  [ ]Audible excessive secretions   [ ]Rhonchi        [ ]Right [ ]Left [ ]Bilateral  [ ]Crackles        [ ]Right [ ]Left [ ]Bilateral  [ ]Wheezing     [ ]Right [ ]Left [ ]Bilateral  [ ]Diminished breath sounds [ ]right [ ]left [ ]bilateral  GASTROINTESTINAL:  [X]Soft  [ ]Distended   [ ]+BS  [ ]Non tender [ ]Tender  [ ]PEG [ ]OGT/ NGT  Last BM:   GENITOURINARY:  MUSCULOSKELETAL:   [ ]Normal   [X ]Weakness  [ ]Bed/Wheelchair bound [ ]Edema  NEUROLOGIC:   [X ]No focal deficits  [ ]Cognitive impairment  [ ]Dysphagia [ ]Dysarthria [ ]Paresis [ ]Other   SKIN:   [X ]Normal    [ ]Rash  [ ]Pressure ulcer(s)       Present on admission [ ]y [ ]n      LABS:  reviewed                        7.9    13.60 )-----------( 116      ( 11 Jan 2023 08:14 )             26.0   01-11    132<L>  |  98  |  43<H>  ----------------------------<  128<H>  4.8   |  21  |  1.8<H>    Ca    8.7      11 Jan 2023 08:14  Mg     2.2     01-11    TPro  6.0  /  Alb  3.2<L>  /  TBili  0.6  /  DBili  x   /  AST  65<H>  /  ALT  32  /  AlkPhos  893<H>  01-11      Urinalysis Basic - ( 10 Dereck 2023 20:14 )    Color: Yellow / Appearance: Clear / SG: >1.050 / pH: x  Gluc: x / Ketone: Negative  / Bili: Negative / Urobili: 3 mg/dL   Blood: x / Protein: 30 mg/dL / Nitrite: Negative   Leuk Esterase: Negative / RBC: 100 /HPF / WBC 4 /HPF   Sq Epi: x / Non Sq Epi: 0 /HPF / Bacteria: Negative      RADIOLOGY & ADDITIONAL STUDIES:  reviewed    < from: Xray Chest 1 View- PORTABLE-Urgent (01.09.23 @ 18:58) >  Impression:    Right suprahilar opacity. Without difference.    Please see concurrent CT scan of the thorax.        < end of copied text >    < from: CT Angio Chest PE Protocol w/ IV Cont (01.09.23 @ 17:16) >    IMPRESSION:    1. Since August 28, 2022, no evidence of pulmonary embolus;   redemonstrated are scattered areas of bronchiolectasis and bronchial wall   thickening with areas of mucous plugging, compatible with small airways   disease.    2. Since December 19, 2022, innumerable bilobar hepatic metastases have   overall increased in size and number.    3. Increased bulky upper abdominal and retroperitoneal adenopathy.    4. New small volume abdominopelvic ascites and new trace bilateral   pleural effusions, right greater than left.    --- End of Report ---    < end of copied text >    < from: 12 Lead ECG (01.09.23 @ 16:56) >    Ventricular Rate 98 BPM    Atrial Rate 98 BPM    P-R Interval 238 ms    QRS Duration 110 ms    Q-T Interval 364 ms    QTC Calculation(Bazett) 464 ms    P Axis 73 degrees    R Axis -58 degrees    T Axis 97 degrees    Diagnosis Line Sinus rhythm [id number] degree A-V block  Left anterior fascicular block  ST & T wave abnormality, consider lateral ischemia  Abnormal ECG    Confirmed by ERNESTO WRIGHT, SHEKHAR ([id number]) on 1/10/2023 6:58:23 AM    < end of copied text >           HPI:    77 year old patient known to have, NSCLC Ca Stage 3 not on Chemo, HTN, HLD, DM and CKD [id number], presented to ED for abdominal discomfort associated with new abdominal mass at periumbilical site, Bl pleural effusions, small volume ascites, anemia. Patient is no longer a candidate for cancer treatment and heme onc did recommend CMO in last note- pending brain MRI. Palliative consulted for GOC.     INTERVAL EVENTS:  - patient complaining of dizziness which was worse in the chair and with transfer, but feels better now that hes in bed. he denies dyspnea. denies pain  - patient was made CMo yesterday, pending d/c home with hospice monday     ADVANCE DIRECTIVES:    MOLST  [ X ]  Living Will  [ ]   DECISION MAKER(s):  [ ] Health Care Proxy(s)  [X ] Surrogate(s)  [ ] Guardian           Name(s): Phone Number(s): Wife, daughter, and son     BASELINE (I)ADL(s) (prior to admission):  [place]: [ ]Total  [ X] Moderate [ ]Dependent  Palliative Performance Status Version 2:       60  %    http://npcrc.org/files/news/palliative_performance_scale_[email or web address].pdf    Allergies    No Known Allergies  MEDICATIONS  (STANDING):  aspirin enteric coated 81 milliGRAM(s) Oral daily  cholestyramine Powder (Sugar-Free) 4 Gram(s) Oral three times a day  dexAMETHasone     Tablet 1 milliGRAM(s) Oral daily  dronabinol 2.5 milliGRAM(s) Oral two times a day  famotidine    Tablet 20 milliGRAM(s) Oral two times a day  lisinopril 5 milliGRAM(s) Oral daily  montelukast 10 milliGRAM(s) Oral daily  nicotine -   7 mG/24Hr(s) Patch 1 Patch Transdermal daily  polyethylene glycol 3350 17 Gram(s) Oral two times a day  prochlorperazine   Tablet 10 milliGRAM(s) Oral every 6 hours  senna 2 Tablet(s) Oral at bedtime  traMADol 50 milliGRAM(s) Oral every 6 hours    MEDICATIONS  (PRN):  acetaminophen     Tablet .. 650 milliGRAM(s) Oral every 6 hours PRN Temp greater or equal to 38C (100.4F), Mild Pain (1 - 3)  aluminum hydroxide/magnesium hydroxide/simethicone Suspension 30 milliLiter(s) Oral every 4 hours PRN Dyspepsia  HYDROmorphone  Injectable 0.25 milliGRAM(s) IV Push every 1 hour PRN Pain or dysnpea  LORazepam     Tablet 0.5 milliGRAM(s) Oral every 6 hours PRN Anxiety  melatonin 3 milliGRAM(s) Oral at bedtime PRN Insomnia  ondansetron Injectable 4 milliGRAM(s) IV Push every 8 hours PRN Nausea and/or Vomiting    Intolerances    PRESENT SYMPTOMS:  Pain: [ ]yes [X ]no  QOL impact -   Location -                    Aggravating factors -  Quality -  Radiation -  Timing-  Severity (0-10 scale):  Minimal acceptable level (0-10 scale):    Dyspnea:                           [X ]Mild [ ]Moderate [ ]Severe  Anxiety:                             [ ]Mild [ ]Moderate [ ]Severe  Fatigue:                             [ X]Mild [ ]Moderate [ ]Severe  Nausea:                             [ ]Mild [ ]Moderate [ ]Severe  Loss of appetite:              [ ]Mild [ ]Moderate [ ]Severe  Constipation:                    [ ]Mild [ ]Moderate [ ]Severe    Other Symptoms:  + dizziness, is not hungry   [X ]All other review of systems negative     Palliative Performance Status Version 2:      40   %    http://npcrc.org/files/news/palliative_performance_scale_[email or web address].pdf    PHYSICAL EXAM:  GENERAL:  [X ]Alert  [ X]Oriented x  3  [ ]Lethargic  [ ]Cachexia  [ ]Unarousable  [ X]Verbal  [ ]Non-Verbal  Behavioral:   [ ] Anxiety  [ ] Delirium [ ] Agitation [X ] Calm  HEENT:  [ ]Normal   [ X]Dry mouth   [ ]ET Tube/Trach  [ ]Oral lesions  PULMONARY:   [ ]Clear [ X] No Tachypnea  [ ]Audible excessive secretions   [ ]Rhonchi        [ ]Right [ ]Left [ ]Bilateral  [ ]Crackles        [ ]Right [ ]Left [ ]Bilateral  [ ]Wheezing     [ ]Right [ ]Left [ ]Bilateral  [ ]Diminished breath sounds [ ]right [ ]left [ ]bilateral  GASTROINTESTINAL:  [X]Soft  [ ]Distended   [ ]+BS  [ ]Non tender [ ]Tender  [ ]PEG [ ]OGT/ NGT  Last BM:   GENITOURINARY:  MUSCULOSKELETAL:   [ ]Normal   [X ]Weakness  [ ]Bed/Wheelchair bound [ ]Edema  NEUROLOGIC:   [X ]No focal deficits  [ ]Cognitive impairment  [ ]Dysphagia [ ]Dysarthria [ ]Paresis [ ]Other   SKIN:   [X ]Normal    [ ]Rash  [ ]Pressure ulcer(s)       Present on admission [ ]y [ ]n      LABS:  reviewed                        7.9    13.60 )-----------( 116      ( 11 Jan 2023 08:14 )             26.0   01-11    132<L>  |  98  |  43<H>  ----------------------------<  128<H>  4.8   |  21  |  1.8<H>    Ca    8.7      11 Jan 2023 08:14  Mg     2.2     01-11    TPro  6.0  /  Alb  3.2<L>  /  TBili  0.6  /  DBili  x   /  AST  65<H>  /  ALT  32  /  AlkPhos  893<H>  01-11      Urinalysis Basic - ( 10 Dereck 2023 20:14 )    Color: Yellow / Appearance: Clear / SG: >1.050 / pH: x  Gluc: x / Ketone: Negative  / Bili: Negative / Urobili: 3 mg/dL   Blood: x / Protein: 30 mg/dL / Nitrite: Negative   Leuk Esterase: Negative / RBC: 100 /HPF / WBC 4 /HPF   Sq Epi: x / Non Sq Epi: 0 /HPF / Bacteria: Negative      RADIOLOGY & ADDITIONAL STUDIES:  reviewed    < from: Xray Chest 1 View- PORTABLE-Urgent (01.09.23 @ 18:58) >  Impression:    Right suprahilar opacity. Without difference.    Please see concurrent CT scan of the thorax.        < end of copied text >    < from: CT Angio Chest PE Protocol w/ IV Cont (01.09.23 @ 17:16) >    IMPRESSION:    1. Since August 28, 2022, no evidence of pulmonary embolus;   redemonstrated are scattered areas of bronchiolectasis and bronchial wall   thickening with areas of mucous plugging, compatible with small airways   disease.    2. Since December 19, 2022, innumerable bilobar hepatic metastases have   overall increased in size and number.    3. Increased bulky upper abdominal and retroperitoneal adenopathy.    4. New small volume abdominopelvic ascites and new trace bilateral   pleural effusions, right greater than left.    --- End of Report ---    < end of copied text >    < from: 12 Lead ECG (01.09.23 @ 16:56) >    Ventricular Rate 98 BPM    Atrial Rate 98 BPM    P-R Interval 238 ms    QRS Duration 110 ms    Q-T Interval 364 ms    QTC Calculation(Bazett) 464 ms    P Axis 73 degrees    R Axis -58 degrees    T Axis 97 degrees    Diagnosis Line Sinus rhythm [id number] degree A-V block  Left anterior fascicular block  ST & T wave abnormality, consider lateral ischemia  Abnormal ECG    Confirmed by SHEKHAR OROZCO MD ([id number]) on 1/10/2023 6:58:23 AM    < end of copied text >

## 2023-01-13 NOTE — PROGRESS NOTE ADULT - PROBLEM SELECTOR PLAN 2
- no labs  - no IVF  - no artificial feeding  - no vitals  - no pulse ox  - no 02 supplementation  - no injections  - no FS  - comfort feedings OK    Ativan 0.5 mg PO Q4h PRN for agitation and anxiety
resolved at time of visit  continue home regimen - Tramadol 50 mg Q 6 H   will adjust pain regimen as needed
- no labs  - no IVF  - no artificial feeding  - no vitals  - no pulse ox  - no 02 supplementation  - no injections  - no FS  - comfort feedings OK    Ativan 0.5 mg PO Q4h PRN for agitation and anxiety

## 2023-01-13 NOTE — DIETITIAN INITIAL EVALUATION ADULT - OTHER CALCULATIONS
MSJ 1531 x SF 1.2-1.5= 3887-4165 kcal/day. Pro needs  g/day based on 1.2-1.5 g/day. Fluid needs 1940 mL/day based on 25 mL/kg. Est needs consider PCM.  Pt unlikely to meet estimated needs as patient is now CMO.

## 2023-01-13 NOTE — PROGRESS NOTE ADULT - PROBLEM SELECTOR PLAN 4
resolved at time of visit  continue home regimen - Tramadol 50 mg Q 6 H   dilaudid 0.25 mg IVP Q 1 H PRN for pain or dyspnea
Full code  Family meeting tomorrow 2 PM  Will follow up for GOC
resolved at time of visit  continue home regimen - Tramadol 50 mg Q 6 H   add dilaudid 0.25 mg IVP Q 1 H PRN for pain or dyspnea

## 2023-01-14 NOTE — SWALLOW BEDSIDE ASSESSMENT ADULT - NS SPL SWALLOW CLINIC TRIAL FT
Mod/severe oral dysphagia with soft & bite sized and minced & moist characterized by decreased bolus manipulation/ formation, and +bolus holding requiring liquid wash to clear oral residue+ tolerance for small amounts of puree and thin liquids without overt s/s of penetration/ aspiration.

## 2023-01-14 NOTE — PROGRESS NOTE ADULT - REASON FOR ADMISSION
Metastatic Lung Cancer
Metastatic Lung Cancer
stage 4 lung cancer
Metastatic lung cancer
abdominal pain

## 2023-01-14 NOTE — SWALLOW BEDSIDE ASSESSMENT ADULT - SWALLOW EVAL: DIAGNOSIS
Mod/severe oral dysphagia with soft & bite sized and minced & moist. + tolerance for small amounts of puree and thin liquids without overt s/s of penetration/ aspiration.

## 2023-01-14 NOTE — SWALLOW BEDSIDE ASSESSMENT ADULT - ORAL PHASE
w/ soft solids and minced & moist/Decreased anterior-posterior movement of the bolus/Delayed oral transit time/Lingual stasis

## 2023-01-14 NOTE — SWALLOW BEDSIDE ASSESSMENT ADULT - SLP PERTINENT HISTORY OF CURRENT PROBLEM
77 year old patient known to have, NSCLC Ca Stage 3 not on Chemo, HTN, HLD, DM and CKD stage 3a. Presented to ED for abdominal discomfort associated with new abdominal mass at periumbilical site, Bl pleural effusions, small volume ascites, anemia. Patient is no longer a candidate for cancer treatment and heme oncology. Currently on CMO, hospice on d/c. MRI Head -->New multiple osseous lesions in frontal, bilateral parietal left clivus since the prior brain MRI from 4/30/2021. Findings compatible with osseous metastasis.

## 2023-01-14 NOTE — PROGRESS NOTE ADULT - SUBJECTIVE AND OBJECTIVE BOX
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 This morning he is resting comfortably in bed and reports no issues or overnight events.    OBJECTIVE  PAST MEDICAL & SURGICAL HISTORY  HTN (hypertension)    HLD (hyperlipidemia)    Type 2 diabetes mellitus    NSCLC metastatic to liver    No significant past surgical history      ALLERGIES:  No Known Allergies    MEDICATIONS:  STANDING MEDICATIONS  aspirin enteric coated 81 milliGRAM(s) Oral daily  cholestyramine Powder (Sugar-Free) 4 Gram(s) Oral three times a day  dexAMETHasone     Tablet 1 milliGRAM(s) Oral daily  dronabinol 2.5 milliGRAM(s) Oral two times a day  famotidine    Tablet 20 milliGRAM(s) Oral two times a day  lisinopril 5 milliGRAM(s) Oral daily  montelukast 10 milliGRAM(s) Oral daily  nicotine -   7 mG/24Hr(s) Patch 1 Patch Transdermal daily  polyethylene glycol 3350 17 Gram(s) Oral two times a day  prochlorperazine   Tablet 10 milliGRAM(s) Oral every 6 hours  senna 2 Tablet(s) Oral at bedtime  traMADol 50 milliGRAM(s) Oral every 6 hours    PRN MEDICATIONS  acetaminophen     Tablet .. 650 milliGRAM(s) Oral every 6 hours PRN  aluminum hydroxide/magnesium hydroxide/simethicone Suspension 30 milliLiter(s) Oral every 4 hours PRN  HYDROmorphone  Injectable 0.25 milliGRAM(s) IV Push every 1 hour PRN  LORazepam     Tablet 0.5 milliGRAM(s) Oral every 6 hours PRN  melatonin 3 milliGRAM(s) Oral at bedtime PRN  ondansetron Injectable 4 milliGRAM(s) IV Push every 8 hours PRN      VITAL SIGNS: Last 24 Hours  T(C): 36.7 (13 Jan 2023 14:11), Max: 36.7 (13 Jan 2023 14:11)  T(F): 98.1 (13 Jan 2023 14:11), Max: 98.1 (13 Jan 2023 14:11)  HR: 104 (13 Jan 2023 14:11) (104 - 104)  BP: 91/55 (13 Jan 2023 14:11) (91/55 - 91/55)  BP(mean): --  RR: 18 (13 Jan 2023 14:11) (18 - 18)  SpO2: --    LABS:    01-12    134<L>  |  101  |  54<H>  ----------------------------<  213<H>  5.4<H>   |  19  |  2.8<H>    Ca    8.5      12 Jan 2023 11:30                    RADIOLOGY:      PHYSICAL EXAM:  CONSTITUTIONAL: No acute distress, AAOx3  HEAD: Atraumatic, normocephalic  EYES: EOM intact, PERRLA, conjunctiva and sclera clear  ENT: moist mucous membranes  PULMONARY: Clear to auscultation bilaterally  CARDIOVASCULAR: Regular rate and rhythm  GASTROINTESTINAL: Soft, non-tender, non-distended; bowel sounds present  MUSCULOSKELETAL: no edema  NEUROLOGY: non-focal  SKIN: warm and dry

## 2023-01-14 NOTE — PROGRESS NOTE ADULT - ATTENDING COMMENTS
about same  stage 4 lung ca,   palliative care
denies signif pain, mostly c/o severe leg weakness  c/o persistent cough  and pruritis.  appreciate palliative care, onc input.
seen and examined.  pt still under the impression that there is further treatment options.  will recall onc and explain the pt if there is still any options left, I thought not.
doing about same  heme consult and input appreciated  CMO in place , family in agreement
doing about same  on CMO  appreciate palliative care assistance

## 2023-01-14 NOTE — PROGRESS NOTE ADULT - ASSESSMENT
77 year old patient known to have, NSCLC Ca Stage 3 not on Chemo, HTN, HLD, DM and CKD [id number], presented to ED for abdominal pain associated with constipation of 10 days duration.   Admitted for pain control and palliative care.     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 Avila - not responsive to previous regimen, has been to [facility] [place]. per previous note, onc has no further treatment options for patient. recommends CMO.      - No nausea, no vomiting and tolerating PO intake - pain is controlled at the moment - no chest pain   - WBC 14.3 - Hb 9.1 - MCV 92 -  - AG 15 - crea 1.3 (b/l 1.4) - ALP 1382 - trop 0.03 x1   - UA neg - RVP neg   - EKG NSR - LAFB   CXR - right suprahilar opacity without difference  CT agnio no PE - small airway disease - bilobar hepatic mets with increasing size - small volume ascites - bilat trace pleural effusion - abdo/retrioperitoneal LN   In the ED given 1L LR     - Pain control PRN   - no additional intervention at the moment     #Leukocytosis likely secondary to steroid use - downtrending  - WBC 13K   - ni signs of infection   - on chronic dexamethasone for small airways disease     #Anemia Normocytic  - Hb 7.9  - Monitor CBC  - Keep active type and screen    #DM  #CKD stage 3a - stable  #HTN   #HLD  - c/w home meds   - sliding scale     #DVT - heparin sq   #GI - pantoprazole   #Diet - DASH - only eats soups   #Activity - IAT   #Code  - Full code   #Disposition - IP      needs discussion with palliative for CMO/hospice evaluation. Spoke with Dr. Collins    
77 year old patient known to have, NSCLC Ca Stage 3 not on Chemo, HTN, HLD, DM and CKD [id number], presented to ED for abdominal pain associated with constipation of 10 days duration.   Admitted for pain control and palliative care.     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 Avila - not responsive to previous regimen, has been to [facility] [place] before. per previous note, onc has no further treatment options for patient. recommends CMO. palliative care consult placed. f/i   No nausea, no vomiting and tolerating PO intake - pain is controlled at the moment - no chest pain   WBC 14.3 - Hb 9.1 - MCV 92 -  - AG 15 - crea 1.3 (b/l 1.4) - ALP 1382 - trop 0.03 x1   UA neg - RVP neg   EKG NSR - LAFB   CXR - right suprahilar opacity without difference  CT agnio no PE - small airway disease - bilobar hepatic mets with increasing size - small volume ascites - bilat trace pleural effusion - abdo/retrioperitoneal LN   In the ED given 1L LR     - Pain control PRN   - no additional intervention at the moment     #Leukocytosis likely secondary to steroid use - downtrending  - WBC 17K   - ni signs of infection   - on chronic dexamethasone for small airways disease   #Anemia Normocytic  - Hb 9.2  - Follow up iron studies   - Monitor CBC  - Keep active type and screen    #DM  #CKD stage 3a - stable  #HTN   #HLD  - c/w home meds   - sliding scale   - no need to repeat a1c/lipid profile     #DVT - heparin sq     #GI - pantoprazole     #Diet - DASH - only eats soups     #Activity - IAT     #Code - Full code     #Disposition - IP     #Handoff: needs discussion with palliative for CMO/hospice evaluation. Spoke with Dr. Collins  
77 year old patient known to have, NSCLC Ca Stage 3 not on Chemo, HTN, HLD, DM and CKD [id number], presented to ED for abdominal pain associated with constipation of 10 days duration.   Admitted for pain control and palliative care.     # Abdominal pain with mass at periumbilical site - improved  # Neck pain and dizziness with SOB  # NSCLC Ca Stage 3 with metastasis to liver and bone   # Bilateral pleural effusion with small airways disease - small volume ascites   not on Chemo; follows with Dr. Avila - not responsive to previous regimen, has been to [facility] [place] before. per previous note, onc has no further treatment options for patient. recommends CMO.   - initially diagnosed with stage 3  - placed on chemoRT with carbotaxol followed by durvalmab maintenance   - Surveillance PET scan showed extensive recurrence involving abdominopelvic LNs, bone (right iliac wing , T2 , C4 without CT correlate ) , liver lesion measuring 5.8 X 5.2 cm.   - No nausea, no vomiting and tolerating PO intake - pain is controlled at the moment - no chest pain   - UA neg - RVP neg   - EKG NSR - LAFB   CXR - right suprahilar opacity without difference  CT agnio no PE - small airway disease - bilobar hepatic mets with increasing size - small volume ascites - bilat trace pleural effusion - abdo/retrioperitoneal LN   In the ED given 1L LR   - Pain control PRN   - no additional intervention at the moment     # Hame and oncology consult  -  pt has marked decline and failure to thrive.   - pain control of lung CA  - recommends comfort measure only  - goal of care discussion        #Leukocytosis likely secondary to steroid use - downtrending  - WBC 13K   - ni signs of infection   - on chronic dexamethasone for small airways disease     #Anemia Normocytic  - Hb 7.9  - iron panel sent, vit b12, folate follow up on that  - Keep active type and screen    #DM  #CKD stage 3a - stable  #HTN   #HLD  - c/w home meds   - sliding scale     #DVT - heparin sq   #GI - pantoprazole   #Diet - DASH - only eats soups   #Activity - IAT   #Code  - Full code   #Disposition - IP      needs discussion with palliative for CMO/hospice evaluation.     
77 year old patient known to have, NSCLC Ca Stage 3 not on Chemo, HTN, HLD, DM and CKD [id number], presented to ED for abdominal pain associated with constipation of 10 days duration.   Admitted for pain control and palliative care.     # Abdominal pain with mass at periumbilical site - improved  # Neck pain and dizziness with SOB  # NSCLC Ca Stage 3 with metastasis to liver and bone   # Bilateral pleural effusion with small airways disease - small volume ascites   not on Chemo; follows with Dr. Avila - not responsive to previous regimen, [place] [facility] [place] per previous note, onc has no further treatment options for patient.   - initially diagnosed with stage 3  - placed on chemo RT with carbotaxol followed by durvalmab maintenance   - Surveillance PET scan showed extensive recurrence involving abdominopelvic LNs, bone (right iliac wing , T2 , C4 without CT correlate ) , liver lesion measuring 5.8 X 5.2 cm.   - UA neg - RVP neg   - EKG NSR - LAFB   - CXR - right suprahilar opacity without difference  - CT angio no PE - small airway disease - bilobar hepatic mets with increasing size - small volume ascites - bilat trace pleural effusion - abd / retroperitoneal LN   - Pain control PRN   - no additional intervention at the moment     # Hame and oncology consult  - pt has marked decline and failure to thrive.   - pain control of lung CA  - recommends comfort measure only  - goal of care discussion    # Anemia Normocytic  - Hb 7.9  - iron panel sent, vit b12 757, folate follow up on that  - Keep active type and screen    #DM  #CKD stage 3a - stable  #HTN   #HLD  - c/w home meds   - sliding scale     #DVT - heparin sq   #GI - pantoprazole   #Diet - DASH - only eats soups   #Activity - IAT   #Code: CMO    
77 year old patient known to have, NSCLC Ca Stage 3 not on Chemo, HTN, HLD, DM and CKD [id number], presented to ED for abdominal pain associated with constipation of 10 days duration.   Admitted for pain control and palliative care.     # Abdominal pain with mass at periumbilical site - improved  # Neck pain and dizziness with SOB  # NSCLC Ca Stage 3 with metastasis to liver and bone   # Bilateral pleural effusion with small airways disease - small volume ascites   not on Chemo; follows with Dr. Avila - not responsive to previous regimen, [place] [facility] [place] per previous note, onc has no further treatment options for patient. recommends CMO.   - initially diagnosed with stage 3  - placed on chemoRT with carbotaxol followed by durvalmab maintenance   - Surveillance PET scan showed extensive recurrence involving abdominopelvic LNs, bone (right iliac wing , T2 , C4 without CT correlate ) , liver lesion measuring 5.8 X 5.2 cm.   - UA neg - RVP neg   - EKG NSR - LAFB   - CXR - right suprahilar opacity without difference  CT agnio no PE - small airway disease - bilobar hepatic mets with increasing size - small volume ascites - bilat trace pleural effusion - abdo/retrioperitoneal LN   In the ED given 1L LR   - Pain control PRN   - no additional intervention at the moment     # Hame and oncology consult  -  pt has marked decline and failure to thrive.   - pain control of lung CA  - recommends comfort measure only  - goal of care discussion        #Leukocytosis likely secondary to steroid use - downtrending  - WBC 13K   - ni signs of infection   - on chronic dexamethasone for small airways disease     #Anemia Normocytic  - Hb 7.9  - iron panel sent, vit b12, folate follow up on that  - Keep active type and screen    #DM  #CKD stage 3a - stable  #HTN   #HLD  - c/w home meds   - sliding scale     #DVT - heparin sq   #GI - pantoprazole   #Diet - DASH - only eats soups   #Activity - IAT   #Code: CMO  #Disposition - Hospice         
77 year old patient known to have, NSCLC Ca Stage 3 not on Chemo, HTN, HLD, DM and CKD [id number], presented to ED for abdominal discomfort associated with new abdominal mass at periumbilical site, Bl pleural effusions, small volume ascites, anemia. Patient is no longer a candidate for cancer treatment and heme onc did recommend CMO in last note- pending brain MRI. Palliative consulted for GOC.     CMO now, hospice on d/c.   See recs below.     MEDD (morphine equivalent daily dose): 0      See Recs below.    Please call [phone] with questions or concerns 24/7.   We will continue to follow.     Discussed with primary MD.  
77 year old patient known to have, NSCLC Ca Stage 3 not on Chemo, HTN, HLD, DM and CKD [id number], presented to ED for abdominal discomfort associated with new abdominal mass at periumbilical site, Bl pleural effusions, small volume ascites, anemia. Patient is no longer a candidate for cancer treatment and heme onc did recommend CMO in last note- pending brain MRI. Palliative consulted for GOC.     CMO now, hospice on d/c.   Will add medications for pain and dyspnea PRN.     MEDD (morphine equivalent daily dose): 0      See Recs below.    Please call [phone] with questions or concerns 24/7.   We will continue to follow.     Discussed with primary MD.  
77 year old patient known to have, NSCLC Ca Stage 3 not on Chemo, HTN, HLD, DM and CKD [id number], presented to ED for abdominal discomfort associated with new abdominal mass at periumbilical site, Bl pleural effusions, small volume ascites, anemia. Patient is no longer a candidate for cancer treatment and heme onc did recommend CMO in last note- pending brain MRI. Palliative consulted for GOC.     No pain on exam.   Plan for family meeting between 2 and 3 tomorrow.     MEDD (morphine equivalent daily dose): 0      See Recs below.    Please call [phone] with questions or concerns 24/7.   We will continue to follow.     Discussed with primary MD.

## 2023-01-15 NOTE — PROGRESS NOTE ADULT - NUTRITIONAL ASSESSMENT
This patient has been assessed with a concern for Malnutrition and has been determined to have a diagnosis/diagnoses of Moderate protein-calorie malnutrition.    This patient is being managed with:   Diet Regular-  DASH/TLC {Sodium & Cholesterol Restricted}  Entered: Jan 9 2023  8:55PM    
This patient has been assessed with a concern for Malnutrition and has been determined to have a diagnosis/diagnoses of Moderate protein-calorie malnutrition.    This patient is being managed with:   Diet Pureed-  Entered: Jan 14 2023  5:30PM    
This patient has been assessed with a concern for Malnutrition and has been determined to have a diagnosis/diagnoses of Moderate protein-calorie malnutrition.    This patient is being managed with:   Diet Regular-  DASH/TLC {Sodium & Cholesterol Restricted}  Entered: Jan 9 2023  8:55PM    
Type Of Destruction Used: Curettage

## 2023-01-15 NOTE — PROGRESS NOTE ADULT - SUBJECTIVE AND OBJECTIVE BOX
Name: NAHED CASTANEDA  Age: 77y  Gender: Male    Pt was seen and examined.   c/o:  denies signif pain, c/o itching    Allergies:  No Known Allergies      PHYSICAL EXAM:    Vitals:  T(C): --  HR: --  BP: --  RR: --  SpO2: --  Wt(kg): --Vital Signs Last 24 Hrs  T(C): --  T(F): --  HR: --  BP: --  BP(mean): --  RR: --  SpO2: --          NECK: Supple, No JVD  CHEST/LUNG: CTA, B/L, No rales, rhonchi, wheezing, or rubs  HEART: S1,S2, N1 Regular rate and rhythm; No murmurs, rubs, or gallops  ABDOMEN: Soft, Nontender, Nondistended; Bowel sounds present  EXTREMITIES:  2+ Peripheral Pulses, No clubbing, cyanosis, or edema      LABS:                  MEDICATIONS  (STANDING):  aspirin enteric coated 81 milliGRAM(s) Oral daily  cholestyramine Powder (Sugar-Free) 4 Gram(s) Oral three times a day  dexAMETHasone     Tablet 1 milliGRAM(s) Oral daily  dronabinol 2.5 milliGRAM(s) Oral two times a day  famotidine    Tablet 20 milliGRAM(s) Oral two times a day  lisinopril 5 milliGRAM(s) Oral daily  montelukast 10 milliGRAM(s) Oral daily  nicotine -   7 mG/24Hr(s) Patch 1 Patch Transdermal daily  polyethylene glycol 3350 17 Gram(s) Oral two times a day  prochlorperazine   Tablet 10 milliGRAM(s) Oral every 6 hours  senna 2 Tablet(s) Oral at bedtime  traMADol 50 milliGRAM(s) Oral every 6 hours        RADIOLOGY & ADDITIONAL TESTS:    Imaging Personally Reviewed:  [ ] YES  [ ] NO    A/P:  77 year old patient known to have, NSCLC Ca Stage 3 not on Chemo, HTN, HLD, DM and CKD [id number], presented to ED for abdominal pain associated with constipation of 10 days duration.   Admitted for pain control and palliative care.     # Abdominal pain with mass at periumbilical site - improved  # Neck pain and dizziness with SOB  # NSCLC Ca Stage 3 with metastasis to liver and bone   # Bilateral pleural effusion with small airways disease - small volume ascites   not on Chemo; follows with Dr. Odaimi - not responsive to previous regimen, has been to [facility] before. per previous note, onc has no further treatment options for patient.   - initially diagnosed with stage 3  - placed on chemo RT with carbotaxol followed by durvalmab maintenance   - Surveillance PET scan showed extensive recurrence involving abdominopelvic LNs, bone (right iliac wing , T2 , C4 without CT correlate ) , liver lesion measuring 5.8 X 5.2 cm.   - UA neg - RVP neg   - EKG NSR - LAFB   - CXR - right suprahilar opacity without difference  - CT angio no PE - small airway disease - bilobar hepatic mets with increasing size - small volume ascites - bilat trace pleural effusion - abd / retroperitoneal LN   - Pain control PRN   - no additional intervention at the moment     # Hame and oncology consult  - pt has marked decline and failure to thrive.   - pain control of lung CA  - recommends comfort measure only  - goal of care discussion    # Anemia Normocytic  - Hb 7.9  - iron panel sent, vit b12 757, folate follow up on that  - Keep active type and screen    #DM  #CKD stage 3a - stable  #HTN   #HLD  - c/w home meds   - sliding scale     #DVT - heparin sq   #GI - pantoprazole   #Diet - DASH - only eats soups   #Activity - IAT   #Code: CMO         Nutritional Assessment:  · Nutritional Assessment	This patient has been assessed with a concern for Malnutrition and has been determined to have a diagnosis/diagnoses of Moderate protein-calorie malnutrition.    This patient is being managed with:   Diet Regular-  DASH/TLC {Sodium & Cholesterol Restricted}  Entered: Jan 9 2023  8:55PM    Attestation Statements:    Attestation Statements:  I have personally seen and examined the patient.  I fully participated in the care of this patient.  I have made amendments to the documentation where necessary, and agree with the history, physical exam, and plan as documented by the Resident.     doing about same  on CMO  appreciate palliative care assistance .

## 2023-01-15 NOTE — PROGRESS NOTE ADULT - PROVIDER SPECIALTY LIST ADULT
Cardiology Preventive
Internal Medicine
Palliative Care

## 2023-01-16 NOTE — DISCHARGE NOTE FOR THE EXPIRED PATIENT - HOSPITAL COURSE
77 year old patient known to have, NSCLC Ca Stage 3 not on Chemo, HTN, HLD, DM and CKD [id number], presented to ED for abdominal discomfort associated with new abdominal mass at periumbilical site, Bl pleural effusions, small volume ascites, anemia. Pt has NSCLC Ca Stage 3 with metastasis to liver and bone and is no longer a candidate for cancer treatment. Hematology/oncology recommended comfort measure only. He was made CMO on 23.    Rn notified me at 2:00am that pt had . Went to bedside to assess and he had  based on cardiopulmonary criteria. Time of death was 2:05am. Family and attending were notified.

## 2023-01-20 DIAGNOSIS — N18.31 CHRONIC KIDNEY DISEASE, STAGE 3A: ICD-10-CM

## 2023-01-20 DIAGNOSIS — C34.11 MALIGNANT NEOPLASM OF UPPER LOBE, RIGHT BRONCHUS OR LUNG: ICD-10-CM

## 2023-01-20 DIAGNOSIS — D63.0 ANEMIA IN NEOPLASTIC DISEASE: ICD-10-CM

## 2023-01-20 DIAGNOSIS — I12.9 HYPERTENSIVE CHRONIC KIDNEY DISEASE WITH STAGE 1 THROUGH STAGE 4 CHRONIC KIDNEY DISEASE, OR UNSPECIFIED CHRONIC KIDNEY DISEASE: ICD-10-CM

## 2023-01-20 DIAGNOSIS — E78.5 HYPERLIPIDEMIA, UNSPECIFIED: ICD-10-CM

## 2023-01-20 DIAGNOSIS — E11.22 TYPE 2 DIABETES MELLITUS WITH DIABETIC CHRONIC KIDNEY DISEASE: ICD-10-CM

## 2023-01-20 DIAGNOSIS — R18.8 OTHER ASCITES: ICD-10-CM

## 2023-01-20 DIAGNOSIS — Z66 DO NOT RESUSCITATE: ICD-10-CM

## 2023-01-20 DIAGNOSIS — Z79.84 LONG TERM (CURRENT) USE OF ORAL HYPOGLYCEMIC DRUGS: ICD-10-CM

## 2023-01-20 DIAGNOSIS — J44.9 CHRONIC OBSTRUCTIVE PULMONARY DISEASE, UNSPECIFIED: ICD-10-CM

## 2023-01-20 DIAGNOSIS — E44.0 MODERATE PROTEIN-CALORIE MALNUTRITION: ICD-10-CM

## 2023-01-20 DIAGNOSIS — C79.51 SECONDARY MALIGNANT NEOPLASM OF BONE: ICD-10-CM

## 2023-01-20 DIAGNOSIS — F17.200 NICOTINE DEPENDENCE, UNSPECIFIED, UNCOMPLICATED: ICD-10-CM

## 2023-01-20 DIAGNOSIS — C78.7 SECONDARY MALIGNANT NEOPLASM OF LIVER AND INTRAHEPATIC BILE DUCT: ICD-10-CM

## 2023-01-20 DIAGNOSIS — R62.7 ADULT FAILURE TO THRIVE: ICD-10-CM

## 2023-01-20 DIAGNOSIS — D61.82 MYELOPHTHISIS: ICD-10-CM

## 2023-01-20 DIAGNOSIS — Z79.82 LONG TERM (CURRENT) USE OF ASPIRIN: ICD-10-CM

## 2023-01-20 DIAGNOSIS — J90 PLEURAL EFFUSION, NOT ELSEWHERE CLASSIFIED: ICD-10-CM

## 2023-01-20 DIAGNOSIS — Z51.5 ENCOUNTER FOR PALLIATIVE CARE: ICD-10-CM

## 2023-01-20 DIAGNOSIS — C77.2 SECONDARY AND UNSPECIFIED MALIGNANT NEOPLASM OF INTRA-ABDOMINAL LYMPH NODES: ICD-10-CM

## 2023-01-20 DIAGNOSIS — K59.00 CONSTIPATION, UNSPECIFIED: ICD-10-CM

## 2023-05-23 NOTE — PATIENT PROFILE ADULT - NSPROHMDIABETBLDGLCTARGET_GEN_A_NUR
Incoming call from patient   - reports he needs a new BP machine from [facility], sent message yesterday and hasn't heard back - can see in chart that he got messages back about this advising him to go to [facility] to  a new one that he's not read - advised patient to call them   - pt also reports he has burning/tingling feeling - could be r/t nerve pain  then reported  red bumps on shin and that his skin on his shin feels like fire ants - asked patient to send in picture (concern for shingles or other issue)    Will follow up with him after       unknown

## 2023-07-17 NOTE — DISEASE MANAGEMENT
[TTNM] : 2c [NTNM] : 2 [MTNM] : 0 [de-identified] : 3600cGy [de-identified] : 6000cGy [de-identified] : Right lung + med Scheduled

## 2023-08-29 NOTE — DISEASE MANAGEMENT
[Clinical] : TNM Stage: c [IIIA] : IIIA [TTNM] : 2c [NTNM] : 2 [MTNM] : 0 [de-identified] : 6000cGy [de-identified] : 6000cGy [de-identified] : Right lung + med Unna Boot Text: An Unna boot was placed to help immobilize the limb and facilitate more rapid healing.

## 2024-03-05 NOTE — PROGRESS NOTE ADULT - WHAT MATTERS MOST
P/w Hb 6.7 (bl ~11-12) Hcrit 18.7 now s/p 1u pRBC. . Plt 344. Acute drop from Hb 11-12, last Hb 11.8 on 1/27/24. BUN elevated to 43 w/o elevation in Cr, possible ?GI Bleed in setting of Alleve use.   No obvious s/sx bleeding. P/w severe back pain, however CTAP Angio w/o active hemorrhage: RP bleed or intraabdominal bleeding r/o'd. No e/o hemolysis, TBili wnl.   Last C-scope 15yrs ago, normal findings. No hx endoscopy, no known hx gastric ulcers. Denies any recent melena/hematochezia or BRBPR. No excessive NSAID use. MAURICIO in ED neg. No n/v or hematemesis. Pt appears to be reliable historian   Very unclear etiology re: acute drop in Hb.     PLAN:   - GI Consult given significant acute drop in Hb  - Start protonix 40mg IVP q12h  - f/u post-transfusion CBC  - f/u add-on LDH, reticulocyte count, haptoglobin  - f/u iron panel, B12, folate, + coags (could not add on to pre-transfusion labs)  - maintain active T&S, transfuse Hb>7.
P/w Hb 6.7 (bl ~11-12) Hcrit 18.7 now s/p 1u pRBC. . Plt 344. Acute drop from Hb 11-12, last Hb 11.8 on 1/27/24. BUN elevated to 43 w/o elevation in Cr, possible ?GI Bleed in setting of Alleve use.   No obvious s/sx bleeding. P/w severe back pain, however CTAP Angio w/o active hemorrhage: RP bleed or intraabdominal bleeding r/o'd. No e/o hemolysis, TBili wnl.   Last C-scope 15yrs ago, normal findings. No hx endoscopy, no known hx gastric ulcers. Denies any recent melena/hematochezia or BRBPR. No excessive NSAID use. MAURICIO in ED neg. No n/v or hematemesis. Pt appears to be reliable historian     3/5: Acute drop in Hgb likely 2/2 NSAID use. AM labs showed downtrend over last 24 hours from 8.5-->7.4 Hgb. Iron panel unremarkable, reticulocyte index significant for hypoproliferative response, likely due to acute bleed.     PLAN:   - GI consult appreciated, no current plan for inpatient endoscopy  - f/u CBC to assess Hgb stability   - c/w protonix 40mg IVP q12h  - maintain active T&S, transfuse Hb>7.
Getting the patient home and comfortable

## 2024-05-23 NOTE — H&P ADULT - NSHPLABSRESULTS_GEN_ALL_CORE
BILATERAL L3-5 MB RFA (L4-S1 FACET JOINTS) CPT 71591, 29404 DX M47.816  PENDING REVIEW   9.1    14.30 )-----------( 118      ( 09 Jan 2023 16:17 )             28.7       01-09    135  |  98  |  35<H>  ----------------------------<  164<H>  4.9   |  22  |  1.3    Ca    9.6      09 Jan 2023 16:17    TPro  7.4  /  Alb  3.7  /  TBili  1.1  /  DBili  0.7<H>  /  AST  83<H>  /  ALT  40  /  AlkPhos  1382<H>  01-09                      Lactate Trend      CARDIAC MARKERS ( 09 Jan 2023 16:17 )  x     / 0.03 ng/mL / x     / x     / x            CAPILLARY BLOOD GLUCOSE

## 2024-12-27 NOTE — ED ADULT TRIAGE NOTE - HEIGHT IN FEET
c/w ASA, Statin, coreg 12.5mg Q12   p2y12 321  Lvx 30mg q 24 hrs   12/24 carotids completed: No sig stenosis   12/20 TTE EF 40 - 45%, Mild MR, Mod Tricupsid regurgitation 5 preop workup in progress   c/w ASA, Statin, coreg 12.5mg Q12   p2y12 321  Lvx 30mg q 24 hrs   12/24 carotids completed: No sig stenosis   12/20 TTE EF 40 - 45%, Mild MR, Mod Tricupsid regurgitation  hd today and sun 12/29  OR mon with Dr. Rivers

## 2025-06-13 NOTE — ASSESSMENT
[FreeTextEntry1] : 76 year old male with NSCLC stage 3 s/p chemoradiation . on adjuvant immunotherapy . \par Low back pain . radiculopathy ?\par New ill defined liver lesion ,rule out metastasis . \par Plan :PET scan . \par         findings and recommendation discussed with patient and his son . 
PAST MEDICAL HISTORY:  No pertinent past medical history